# Patient Record
Sex: MALE | Race: WHITE | NOT HISPANIC OR LATINO | Employment: OTHER | URBAN - METROPOLITAN AREA
[De-identification: names, ages, dates, MRNs, and addresses within clinical notes are randomized per-mention and may not be internally consistent; named-entity substitution may affect disease eponyms.]

---

## 2017-01-03 ENCOUNTER — APPOINTMENT (OUTPATIENT)
Dept: PHYSICAL THERAPY | Facility: CLINIC | Age: 82
End: 2017-01-03
Payer: MEDICARE

## 2017-01-05 ENCOUNTER — APPOINTMENT (OUTPATIENT)
Dept: PHYSICAL THERAPY | Facility: CLINIC | Age: 82
End: 2017-01-05
Payer: MEDICARE

## 2017-01-10 ENCOUNTER — APPOINTMENT (OUTPATIENT)
Dept: PHYSICAL THERAPY | Facility: CLINIC | Age: 82
End: 2017-01-10
Payer: MEDICARE

## 2017-01-12 ENCOUNTER — APPOINTMENT (OUTPATIENT)
Dept: PHYSICAL THERAPY | Facility: CLINIC | Age: 82
End: 2017-01-12
Payer: MEDICARE

## 2017-01-16 ENCOUNTER — APPOINTMENT (INPATIENT)
Dept: RADIOLOGY | Facility: HOSPITAL | Age: 82
DRG: 378 | End: 2017-01-16
Payer: MEDICARE

## 2017-01-16 ENCOUNTER — HOSPITAL ENCOUNTER (INPATIENT)
Facility: HOSPITAL | Age: 82
LOS: 9 days | Discharge: NURSING, REHAB OR LONG-TERM CARE | DRG: 378 | End: 2017-01-25
Attending: EMERGENCY MEDICINE | Admitting: INTERNAL MEDICINE
Payer: MEDICARE

## 2017-01-16 ENCOUNTER — APPOINTMENT (EMERGENCY)
Dept: RADIOLOGY | Facility: HOSPITAL | Age: 82
DRG: 378 | End: 2017-01-16
Payer: MEDICARE

## 2017-01-16 DIAGNOSIS — K92.2 GI BLEED: Primary | ICD-10-CM

## 2017-01-16 DIAGNOSIS — D62 ACUTE BLOOD LOSS ANEMIA: ICD-10-CM

## 2017-01-16 DIAGNOSIS — K92.2 GI (GASTROINTESTINAL BLEED): ICD-10-CM

## 2017-01-16 DIAGNOSIS — K62.5 RECTAL BLEEDING: ICD-10-CM

## 2017-01-16 PROBLEM — I73.9 PAD (PERIPHERAL ARTERY DISEASE) (HCC): Status: ACTIVE | Noted: 2017-01-16

## 2017-01-16 PROBLEM — I10 HYPERTENSION: Status: ACTIVE | Noted: 2017-01-16

## 2017-01-16 PROBLEM — R53.1 WEAKNESS: Status: ACTIVE | Noted: 2017-01-16

## 2017-01-16 PROBLEM — R10.9 ABDOMINAL PAIN: Status: ACTIVE | Noted: 2017-01-16

## 2017-01-16 PROBLEM — M54.9 BACK PAIN: Status: ACTIVE | Noted: 2017-01-16

## 2017-01-16 PROBLEM — R11.0 NAUSEA: Status: ACTIVE | Noted: 2017-01-16

## 2017-01-16 PROBLEM — E78.5 HYPERLIPIDEMIA: Status: ACTIVE | Noted: 2017-01-16

## 2017-01-16 PROBLEM — K57.92 DIVERTICULITIS: Status: ACTIVE | Noted: 2017-01-16

## 2017-01-16 PROBLEM — I25.10 CAD (CORONARY ARTERY DISEASE): Status: ACTIVE | Noted: 2017-01-16

## 2017-01-16 LAB
ABO GROUP BLD: NORMAL
ALBUMIN SERPL BCP-MCNC: 2.8 G/DL (ref 3.5–5)
ALP SERPL-CCNC: 49 U/L (ref 46–116)
ALT SERPL W P-5'-P-CCNC: 17 U/L (ref 12–78)
ANION GAP SERPL CALCULATED.3IONS-SCNC: 12 MMOL/L (ref 4–13)
APTT PPP: 25 SECONDS (ref 24–33)
AST SERPL W P-5'-P-CCNC: 16 U/L (ref 5–45)
BASOPHILS # BLD AUTO: 0 THOUSANDS/ΜL (ref 0–0.1)
BASOPHILS NFR BLD AUTO: 0 % (ref 0–1)
BILIRUB DIRECT SERPL-MCNC: 0.1 MG/DL (ref 0–0.2)
BILIRUB SERPL-MCNC: 0.5 MG/DL (ref 0.2–1)
BILIRUB UR QL STRIP: NEGATIVE
BLD GP AB SCN SERPL QL: NEGATIVE
BUN SERPL-MCNC: 13 MG/DL (ref 5–25)
CALCIUM SERPL-MCNC: 8.7 MG/DL (ref 8.3–10.1)
CHLORIDE SERPL-SCNC: 107 MMOL/L (ref 100–108)
CK SERPL-CCNC: 35 U/L (ref 39–308)
CLARITY UR: CLEAR
CO2 SERPL-SCNC: 25 MMOL/L (ref 21–32)
COLOR UR: NORMAL
CREAT SERPL-MCNC: 1.17 MG/DL (ref 0.6–1.3)
EOSINOPHIL # BLD AUTO: 0.4 THOUSAND/ΜL (ref 0–0.61)
EOSINOPHIL NFR BLD AUTO: 3 % (ref 0–6)
ERYTHROCYTE [DISTWIDTH] IN BLOOD BY AUTOMATED COUNT: 14.8 % (ref 11.6–15.1)
GFR SERPL CREATININE-BSD FRML MDRD: 58.7 ML/MIN/1.73SQ M
GLUCOSE SERPL-MCNC: 117 MG/DL (ref 65–140)
GLUCOSE UR STRIP-MCNC: NEGATIVE MG/DL
HCT VFR BLD AUTO: 34.5 % (ref 42–52)
HGB BLD-MCNC: 11.4 G/DL (ref 14–18)
HGB UR QL STRIP.AUTO: NEGATIVE
INR PPP: 1.11 (ref 0.86–1.16)
KETONES UR STRIP-MCNC: NEGATIVE MG/DL
LEUKOCYTE ESTERASE UR QL STRIP: NEGATIVE
LIPASE SERPL-CCNC: 87 U/L (ref 73–393)
LYMPHOCYTES # BLD AUTO: 1.3 THOUSANDS/ΜL (ref 0.6–4.47)
LYMPHOCYTES NFR BLD AUTO: 10 % (ref 14–44)
MCH RBC QN AUTO: 28.8 PG (ref 27–31)
MCHC RBC AUTO-ENTMCNC: 32.9 G/DL (ref 31.4–37.4)
MCV RBC AUTO: 87 FL (ref 82–98)
MONOCYTES # BLD AUTO: 0.7 THOUSAND/ΜL (ref 0.17–1.22)
MONOCYTES NFR BLD AUTO: 6 % (ref 4–12)
NEUTROPHILS # BLD AUTO: 9.8 THOUSANDS/ΜL (ref 1.85–7.62)
NEUTS SEG NFR BLD AUTO: 80 % (ref 43–75)
NITRITE UR QL STRIP: NEGATIVE
NRBC BLD AUTO-RTO: 0 /100 WBCS
PH UR STRIP.AUTO: 5.5 [PH] (ref 5–9)
PLATELET # BLD AUTO: 519 THOUSANDS/UL (ref 130–400)
PMV BLD AUTO: 7.8 FL (ref 8.9–12.7)
POTASSIUM SERPL-SCNC: 3.9 MMOL/L (ref 3.5–5.3)
PROT SERPL-MCNC: 6.8 G/DL (ref 6.4–8.2)
PROT UR STRIP-MCNC: NEGATIVE MG/DL
PROTHROMBIN TIME: 11.7 SECONDS (ref 9.4–11.7)
RBC # BLD AUTO: 3.95 MILLION/UL (ref 4.7–6.1)
RH BLD: POSITIVE
SODIUM SERPL-SCNC: 144 MMOL/L (ref 136–145)
SP GR UR STRIP.AUTO: 1.01 (ref 1–1.03)
TROPONIN I SERPL-MCNC: 0.02 NG/ML
UROBILINOGEN UR QL STRIP.AUTO: 0.2 E.U./DL
WBC # BLD AUTO: 12.2 THOUSAND/UL (ref 4.8–10.8)

## 2017-01-16 PROCEDURE — 84484 ASSAY OF TROPONIN QUANT: CPT | Performed by: EMERGENCY MEDICINE

## 2017-01-16 PROCEDURE — C9113 INJ PANTOPRAZOLE SODIUM, VIA: HCPCS | Performed by: EMERGENCY MEDICINE

## 2017-01-16 PROCEDURE — 80076 HEPATIC FUNCTION PANEL: CPT | Performed by: EMERGENCY MEDICINE

## 2017-01-16 PROCEDURE — 86850 RBC ANTIBODY SCREEN: CPT | Performed by: EMERGENCY MEDICINE

## 2017-01-16 PROCEDURE — 83690 ASSAY OF LIPASE: CPT | Performed by: EMERGENCY MEDICINE

## 2017-01-16 PROCEDURE — 99285 EMERGENCY DEPT VISIT HI MDM: CPT

## 2017-01-16 PROCEDURE — 86901 BLOOD TYPING SEROLOGIC RH(D): CPT | Performed by: EMERGENCY MEDICINE

## 2017-01-16 PROCEDURE — 71010 HB CHEST X-RAY 1 VIEW FRONTAL (PORTABLE): CPT

## 2017-01-16 PROCEDURE — 85025 COMPLETE CBC W/AUTO DIFF WBC: CPT | Performed by: EMERGENCY MEDICINE

## 2017-01-16 PROCEDURE — 81003 URINALYSIS AUTO W/O SCOPE: CPT | Performed by: EMERGENCY MEDICINE

## 2017-01-16 PROCEDURE — 93005 ELECTROCARDIOGRAM TRACING: CPT | Performed by: EMERGENCY MEDICINE

## 2017-01-16 PROCEDURE — 86920 COMPATIBILITY TEST SPIN: CPT

## 2017-01-16 PROCEDURE — 74177 CT ABD & PELVIS W/CONTRAST: CPT

## 2017-01-16 PROCEDURE — 85610 PROTHROMBIN TIME: CPT | Performed by: PHYSICIAN ASSISTANT

## 2017-01-16 PROCEDURE — C9113 INJ PANTOPRAZOLE SODIUM, VIA: HCPCS | Performed by: PHYSICIAN ASSISTANT

## 2017-01-16 PROCEDURE — 87081 CULTURE SCREEN ONLY: CPT | Performed by: INTERNAL MEDICINE

## 2017-01-16 PROCEDURE — 80048 BASIC METABOLIC PNL TOTAL CA: CPT | Performed by: EMERGENCY MEDICINE

## 2017-01-16 PROCEDURE — 82550 ASSAY OF CK (CPK): CPT | Performed by: EMERGENCY MEDICINE

## 2017-01-16 PROCEDURE — 96376 TX/PRO/DX INJ SAME DRUG ADON: CPT

## 2017-01-16 PROCEDURE — 96365 THER/PROPH/DIAG IV INF INIT: CPT

## 2017-01-16 PROCEDURE — 36415 COLL VENOUS BLD VENIPUNCTURE: CPT | Performed by: EMERGENCY MEDICINE

## 2017-01-16 PROCEDURE — 85730 THROMBOPLASTIN TIME PARTIAL: CPT | Performed by: EMERGENCY MEDICINE

## 2017-01-16 PROCEDURE — 86900 BLOOD TYPING SEROLOGIC ABO: CPT | Performed by: EMERGENCY MEDICINE

## 2017-01-16 RX ORDER — PANTOPRAZOLE SODIUM 40 MG/1
40 INJECTION, POWDER, FOR SOLUTION INTRAVENOUS 2 TIMES DAILY
Status: DISCONTINUED | OUTPATIENT
Start: 2017-01-16 | End: 2017-01-25 | Stop reason: HOSPADM

## 2017-01-16 RX ORDER — PREDNISONE 10 MG/1
2.5 TABLET ORAL DAILY
COMMUNITY
End: 2017-01-25 | Stop reason: HOSPADM

## 2017-01-16 RX ORDER — LOSARTAN POTASSIUM 100 MG/1
50 TABLET ORAL DAILY
Status: ON HOLD | COMMUNITY
End: 2017-06-27

## 2017-01-16 RX ORDER — SIMVASTATIN 10 MG
10 TABLET ORAL
COMMUNITY
End: 2018-02-19

## 2017-01-16 RX ORDER — ASCORBIC ACID 500 MG
500 TABLET ORAL DAILY
COMMUNITY
End: 2018-02-19

## 2017-01-16 RX ORDER — SODIUM CHLORIDE 9 MG/ML
50 INJECTION, SOLUTION INTRAVENOUS CONTINUOUS
Status: DISCONTINUED | OUTPATIENT
Start: 2017-01-16 | End: 2017-01-16

## 2017-01-16 RX ORDER — LOSARTAN POTASSIUM 50 MG/1
100 TABLET ORAL DAILY
Status: DISCONTINUED | OUTPATIENT
Start: 2017-01-16 | End: 2017-01-20

## 2017-01-16 RX ORDER — MORPHINE SULFATE 2 MG/ML
2 INJECTION, SOLUTION INTRAMUSCULAR; INTRAVENOUS EVERY 4 HOURS PRN
Status: DISCONTINUED | OUTPATIENT
Start: 2017-01-16 | End: 2017-01-25 | Stop reason: HOSPADM

## 2017-01-16 RX ORDER — PRAVASTATIN SODIUM 20 MG
20 TABLET ORAL
Status: DISCONTINUED | OUTPATIENT
Start: 2017-01-16 | End: 2017-01-25 | Stop reason: HOSPADM

## 2017-01-16 RX ORDER — DONEPEZIL HYDROCHLORIDE 5 MG/1
10 TABLET, FILM COATED ORAL
Status: DISCONTINUED | OUTPATIENT
Start: 2017-01-16 | End: 2017-01-25 | Stop reason: HOSPADM

## 2017-01-16 RX ORDER — CILOSTAZOL 100 MG/1
100 TABLET ORAL 2 TIMES DAILY
COMMUNITY
End: 2018-02-19

## 2017-01-16 RX ORDER — DONEPEZIL HYDROCHLORIDE 10 MG/1
5 TABLET, FILM COATED ORAL
COMMUNITY
End: 2017-06-24

## 2017-01-16 RX ORDER — SODIUM CHLORIDE 9 MG/ML
75 INJECTION, SOLUTION INTRAVENOUS CONTINUOUS
Status: DISCONTINUED | OUTPATIENT
Start: 2017-01-16 | End: 2017-01-19

## 2017-01-16 RX ORDER — GABAPENTIN 300 MG/1
300 CAPSULE ORAL 2 TIMES DAILY
COMMUNITY
End: 2018-02-19

## 2017-01-16 RX ORDER — ACETAMINOPHEN 325 MG/1
650 TABLET ORAL EVERY 6 HOURS PRN
Status: DISCONTINUED | OUTPATIENT
Start: 2017-01-16 | End: 2017-01-25 | Stop reason: HOSPADM

## 2017-01-16 RX ORDER — CIPROFLOXACIN 2 MG/ML
400 INJECTION, SOLUTION INTRAVENOUS EVERY 12 HOURS
Status: DISCONTINUED | OUTPATIENT
Start: 2017-01-16 | End: 2017-01-25 | Stop reason: HOSPADM

## 2017-01-16 RX ORDER — METOPROLOL TARTRATE 50 MG/1
50 TABLET, FILM COATED ORAL 2 TIMES DAILY
COMMUNITY
End: 2018-02-19

## 2017-01-16 RX ORDER — FOLIC ACID/MULTIVIT,IRON,MINER .4-18-35
1 TABLET,CHEWABLE ORAL DAILY
COMMUNITY
End: 2018-02-19

## 2017-01-16 RX ORDER — TOLTERODINE TARTRATE 1 MG/1
2 TABLET, EXTENDED RELEASE ORAL DAILY
COMMUNITY
End: 2018-02-19

## 2017-01-16 RX ORDER — METOPROLOL TARTRATE 50 MG/1
50 TABLET, FILM COATED ORAL 2 TIMES DAILY
Status: DISCONTINUED | OUTPATIENT
Start: 2017-01-16 | End: 2017-01-20

## 2017-01-16 RX ORDER — ONDANSETRON 2 MG/ML
4 INJECTION INTRAMUSCULAR; INTRAVENOUS EVERY 6 HOURS PRN
Status: DISCONTINUED | OUTPATIENT
Start: 2017-01-16 | End: 2017-01-25 | Stop reason: HOSPADM

## 2017-01-16 RX ORDER — ASCORBIC ACID 500 MG
500 TABLET ORAL DAILY
Status: DISCONTINUED | OUTPATIENT
Start: 2017-01-17 | End: 2017-01-25 | Stop reason: HOSPADM

## 2017-01-16 RX ORDER — GABAPENTIN 300 MG/1
300 CAPSULE ORAL 2 TIMES DAILY
Status: DISCONTINUED | OUTPATIENT
Start: 2017-01-16 | End: 2017-01-25 | Stop reason: HOSPADM

## 2017-01-16 RX ORDER — PANTOPRAZOLE SODIUM 40 MG/1
40 INJECTION, POWDER, FOR SOLUTION INTRAVENOUS ONCE
Status: COMPLETED | OUTPATIENT
Start: 2017-01-16 | End: 2017-01-16

## 2017-01-16 RX ADMIN — METRONIDAZOLE 500 MG: 500 INJECTION, SOLUTION INTRAVENOUS at 17:08

## 2017-01-16 RX ADMIN — SODIUM CHLORIDE 500 ML: 0.9 INJECTION, SOLUTION INTRAVENOUS at 08:28

## 2017-01-16 RX ADMIN — CIPROFLOXACIN 400 MG: 2 INJECTION, SOLUTION INTRAVENOUS at 17:54

## 2017-01-16 RX ADMIN — METOPROLOL TARTRATE 50 MG: 50 TABLET ORAL at 17:53

## 2017-01-16 RX ADMIN — PANTOPRAZOLE SODIUM 40 MG: 40 INJECTION, POWDER, FOR SOLUTION INTRAVENOUS at 11:37

## 2017-01-16 RX ADMIN — SODIUM CHLORIDE 75 ML/HR: 0.9 INJECTION, SOLUTION INTRAVENOUS at 17:55

## 2017-01-16 RX ADMIN — DONEPEZIL HYDROCHLORIDE 10 MG: 5 TABLET, FILM COATED ORAL at 21:04

## 2017-01-16 RX ADMIN — PANTOPRAZOLE SODIUM 40 MG: 40 INJECTION, POWDER, FOR SOLUTION INTRAVENOUS at 21:04

## 2017-01-16 RX ADMIN — LOSARTAN POTASSIUM 100 MG: 50 TABLET, FILM COATED ORAL at 17:09

## 2017-01-16 RX ADMIN — PANTOPRAZOLE SODIUM 40 MG: 40 INJECTION, POWDER, FOR SOLUTION INTRAVENOUS at 08:38

## 2017-01-16 RX ADMIN — PRAVASTATIN SODIUM 20 MG: 20 TABLET ORAL at 17:09

## 2017-01-16 RX ADMIN — IOHEXOL 100 ML: 350 INJECTION, SOLUTION INTRAVENOUS at 14:13

## 2017-01-16 RX ADMIN — SODIUM CHLORIDE 50 ML/HR: 0.9 INJECTION, SOLUTION INTRAVENOUS at 09:51

## 2017-01-16 RX ADMIN — SODIUM CHLORIDE 8 MG/HR: 9 INJECTION, SOLUTION INTRAVENOUS at 08:56

## 2017-01-16 RX ADMIN — GABAPENTIN 300 MG: 300 CAPSULE ORAL at 17:53

## 2017-01-16 RX ADMIN — IOHEXOL 50 ML: 240 INJECTION, SOLUTION INTRATHECAL; INTRAVASCULAR; INTRAVENOUS; ORAL at 11:34

## 2017-01-17 ENCOUNTER — APPOINTMENT (OUTPATIENT)
Dept: PHYSICAL THERAPY | Facility: CLINIC | Age: 82
End: 2017-01-17
Payer: MEDICARE

## 2017-01-17 PROBLEM — D62 ACUTE BLOOD LOSS ANEMIA: Status: ACTIVE | Noted: 2017-01-17

## 2017-01-17 LAB
ANION GAP SERPL CALCULATED.3IONS-SCNC: 10 MMOL/L (ref 4–13)
BUN SERPL-MCNC: 14 MG/DL (ref 5–25)
CALCIUM SERPL-MCNC: 7.7 MG/DL (ref 8.3–10.1)
CHLORIDE SERPL-SCNC: 108 MMOL/L (ref 100–108)
CO2 SERPL-SCNC: 24 MMOL/L (ref 21–32)
CREAT SERPL-MCNC: 1.14 MG/DL (ref 0.6–1.3)
ERYTHROCYTE [DISTWIDTH] IN BLOOD BY AUTOMATED COUNT: 14 % (ref 11.6–15.1)
GFR SERPL CREATININE-BSD FRML MDRD: >60 ML/MIN/1.73SQ M
GLUCOSE SERPL-MCNC: 104 MG/DL (ref 65–140)
HCT VFR BLD AUTO: 26.7 % (ref 42–52)
HCT VFR BLD AUTO: 29.6 % (ref 42–52)
HGB BLD-MCNC: 8.8 G/DL (ref 14–18)
HGB BLD-MCNC: 9.5 G/DL (ref 14–18)
MAGNESIUM SERPL-MCNC: 1.7 MG/DL (ref 1.6–2.6)
MCH RBC QN AUTO: 28.8 PG (ref 27–31)
MCHC RBC AUTO-ENTMCNC: 32.8 G/DL (ref 31.4–37.4)
MCV RBC AUTO: 88 FL (ref 82–98)
PLATELET # BLD AUTO: 406 THOUSANDS/UL (ref 130–400)
PMV BLD AUTO: 8.5 FL (ref 8.9–12.7)
POTASSIUM SERPL-SCNC: 3.6 MMOL/L (ref 3.5–5.3)
RBC # BLD AUTO: 3.04 MILLION/UL (ref 4.7–6.1)
SODIUM SERPL-SCNC: 142 MMOL/L (ref 136–145)
WBC # BLD AUTO: 15.6 THOUSAND/UL (ref 4.8–10.8)

## 2017-01-17 PROCEDURE — G8988 SELF CARE GOAL STATUS: HCPCS

## 2017-01-17 PROCEDURE — 97162 PT EVAL MOD COMPLEX 30 MIN: CPT

## 2017-01-17 PROCEDURE — C9113 INJ PANTOPRAZOLE SODIUM, VIA: HCPCS | Performed by: PHYSICIAN ASSISTANT

## 2017-01-17 PROCEDURE — 85027 COMPLETE CBC AUTOMATED: CPT | Performed by: NURSE PRACTITIONER

## 2017-01-17 PROCEDURE — 80048 BASIC METABOLIC PNL TOTAL CA: CPT | Performed by: NURSE PRACTITIONER

## 2017-01-17 PROCEDURE — 83735 ASSAY OF MAGNESIUM: CPT | Performed by: NURSE PRACTITIONER

## 2017-01-17 PROCEDURE — 97166 OT EVAL MOD COMPLEX 45 MIN: CPT

## 2017-01-17 PROCEDURE — 85018 HEMOGLOBIN: CPT | Performed by: PHYSICIAN ASSISTANT

## 2017-01-17 PROCEDURE — G8987 SELF CARE CURRENT STATUS: HCPCS

## 2017-01-17 PROCEDURE — G8979 MOBILITY GOAL STATUS: HCPCS

## 2017-01-17 PROCEDURE — G8978 MOBILITY CURRENT STATUS: HCPCS

## 2017-01-17 PROCEDURE — 85014 HEMATOCRIT: CPT | Performed by: PHYSICIAN ASSISTANT

## 2017-01-17 RX ORDER — POLYETHYLENE GLYCOL 3350 17 G/17G
238 POWDER, FOR SOLUTION ORAL ONCE
Status: COMPLETED | OUTPATIENT
Start: 2017-01-17 | End: 2017-01-17

## 2017-01-17 RX ADMIN — METRONIDAZOLE 500 MG: 500 INJECTION, SOLUTION INTRAVENOUS at 08:12

## 2017-01-17 RX ADMIN — POLYETHYLENE GLYCOL 3350 238 G: 17 POWDER, FOR SOLUTION ORAL at 15:33

## 2017-01-17 RX ADMIN — BISACODYL 10 MG: 5 TABLET, COATED ORAL at 13:22

## 2017-01-17 RX ADMIN — PANTOPRAZOLE SODIUM 40 MG: 40 INJECTION, POWDER, FOR SOLUTION INTRAVENOUS at 08:14

## 2017-01-17 RX ADMIN — METOPROLOL TARTRATE 50 MG: 50 TABLET ORAL at 17:20

## 2017-01-17 RX ADMIN — METRONIDAZOLE 500 MG: 500 INJECTION, SOLUTION INTRAVENOUS at 23:45

## 2017-01-17 RX ADMIN — GABAPENTIN 300 MG: 300 CAPSULE ORAL at 08:14

## 2017-01-17 RX ADMIN — METOPROLOL TARTRATE 50 MG: 50 TABLET ORAL at 08:14

## 2017-01-17 RX ADMIN — CIPROFLOXACIN 400 MG: 2 INJECTION, SOLUTION INTRAVENOUS at 03:36

## 2017-01-17 RX ADMIN — OXYCODONE HYDROCHLORIDE AND ACETAMINOPHEN 500 MG: 500 TABLET ORAL at 08:21

## 2017-01-17 RX ADMIN — PRAVASTATIN SODIUM 20 MG: 20 TABLET ORAL at 16:58

## 2017-01-17 RX ADMIN — METRONIDAZOLE 500 MG: 500 INJECTION, SOLUTION INTRAVENOUS at 16:57

## 2017-01-17 RX ADMIN — METRONIDAZOLE 500 MG: 500 INJECTION, SOLUTION INTRAVENOUS at 00:31

## 2017-01-17 RX ADMIN — DONEPEZIL HYDROCHLORIDE 10 MG: 5 TABLET, FILM COATED ORAL at 21:58

## 2017-01-17 RX ADMIN — GABAPENTIN 300 MG: 300 CAPSULE ORAL at 17:20

## 2017-01-17 RX ADMIN — LOSARTAN POTASSIUM 100 MG: 50 TABLET, FILM COATED ORAL at 08:21

## 2017-01-17 RX ADMIN — PANTOPRAZOLE SODIUM 40 MG: 40 INJECTION, POWDER, FOR SOLUTION INTRAVENOUS at 21:58

## 2017-01-17 RX ADMIN — Medication 1 TABLET: at 08:21

## 2017-01-17 RX ADMIN — CIPROFLOXACIN 400 MG: 2 INJECTION, SOLUTION INTRAVENOUS at 17:22

## 2017-01-18 ENCOUNTER — ANESTHESIA EVENT (INPATIENT)
Dept: GASTROENTEROLOGY | Facility: AMBULARY SURGERY CENTER | Age: 82
DRG: 378 | End: 2017-01-18
Payer: MEDICARE

## 2017-01-18 ENCOUNTER — GENERIC CONVERSION - ENCOUNTER (OUTPATIENT)
Dept: OTHER | Facility: OTHER | Age: 82
End: 2017-01-18

## 2017-01-18 LAB
ANION GAP SERPL CALCULATED.3IONS-SCNC: 9 MMOL/L (ref 4–13)
BUN SERPL-MCNC: 9 MG/DL (ref 5–25)
CALCIUM SERPL-MCNC: 8 MG/DL (ref 8.3–10.1)
CHLORIDE SERPL-SCNC: 112 MMOL/L (ref 100–108)
CO2 SERPL-SCNC: 24 MMOL/L (ref 21–32)
CREAT SERPL-MCNC: 1.04 MG/DL (ref 0.6–1.3)
ERYTHROCYTE [DISTWIDTH] IN BLOOD BY AUTOMATED COUNT: 15.4 % (ref 11.6–15.1)
GFR SERPL CREATININE-BSD FRML MDRD: >60 ML/MIN/1.73SQ M
GLUCOSE SERPL-MCNC: 88 MG/DL (ref 65–140)
HCT VFR BLD AUTO: 24.9 % (ref 42–52)
HCT VFR BLD AUTO: 27.1 % (ref 42–52)
HGB BLD-MCNC: 8.1 G/DL (ref 14–18)
HGB BLD-MCNC: 8.8 G/DL (ref 14–18)
MAGNESIUM SERPL-MCNC: 1.7 MG/DL (ref 1.6–2.6)
MCH RBC QN AUTO: 28.4 PG (ref 27–31)
MCHC RBC AUTO-ENTMCNC: 32.5 G/DL (ref 31.4–37.4)
MCV RBC AUTO: 87 FL (ref 82–98)
MRSA NOSE QL CULT: NORMAL
PLATELET # BLD AUTO: 369 THOUSANDS/UL (ref 130–400)
PMV BLD AUTO: 7.8 FL (ref 8.9–12.7)
POTASSIUM SERPL-SCNC: 3.3 MMOL/L (ref 3.5–5.3)
RBC # BLD AUTO: 3.09 MILLION/UL (ref 4.7–6.1)
SODIUM SERPL-SCNC: 145 MMOL/L (ref 136–145)
WBC # BLD AUTO: 8.4 THOUSAND/UL (ref 4.8–10.8)

## 2017-01-18 PROCEDURE — G0008 ADMIN INFLUENZA VIRUS VAC: HCPCS | Performed by: INTERNAL MEDICINE

## 2017-01-18 PROCEDURE — 85014 HEMATOCRIT: CPT | Performed by: INTERNAL MEDICINE

## 2017-01-18 PROCEDURE — 90732 PPSV23 VACC 2 YRS+ SUBQ/IM: CPT | Performed by: INTERNAL MEDICINE

## 2017-01-18 PROCEDURE — 90686 IIV4 VACC NO PRSV 0.5 ML IM: CPT | Performed by: INTERNAL MEDICINE

## 2017-01-18 PROCEDURE — 85027 COMPLETE CBC AUTOMATED: CPT | Performed by: NURSE PRACTITIONER

## 2017-01-18 PROCEDURE — 3E1H88Z IRRIGATION OF LOWER GI USING IRRIGATING SUBSTANCE, VIA NATURAL OR ARTIFICIAL OPENING ENDOSCOPIC: ICD-10-PCS | Performed by: INTERNAL MEDICINE

## 2017-01-18 PROCEDURE — 83735 ASSAY OF MAGNESIUM: CPT | Performed by: NURSE PRACTITIONER

## 2017-01-18 PROCEDURE — 85018 HEMOGLOBIN: CPT | Performed by: INTERNAL MEDICINE

## 2017-01-18 PROCEDURE — C9113 INJ PANTOPRAZOLE SODIUM, VIA: HCPCS | Performed by: PHYSICIAN ASSISTANT

## 2017-01-18 PROCEDURE — 80048 BASIC METABOLIC PNL TOTAL CA: CPT | Performed by: NURSE PRACTITIONER

## 2017-01-18 PROCEDURE — G0009 ADMIN PNEUMOCOCCAL VACCINE: HCPCS | Performed by: INTERNAL MEDICINE

## 2017-01-18 RX ORDER — PROPOFOL 10 MG/ML
INJECTION, EMULSION INTRAVENOUS AS NEEDED
Status: DISCONTINUED | OUTPATIENT
Start: 2017-01-18 | End: 2017-01-18 | Stop reason: SURG

## 2017-01-18 RX ORDER — SODIUM CHLORIDE, SODIUM LACTATE, POTASSIUM CHLORIDE, CALCIUM CHLORIDE 600; 310; 30; 20 MG/100ML; MG/100ML; MG/100ML; MG/100ML
125 INJECTION, SOLUTION INTRAVENOUS CONTINUOUS
Status: DISCONTINUED | OUTPATIENT
Start: 2017-01-18 | End: 2017-01-18

## 2017-01-18 RX ORDER — POTASSIUM CHLORIDE 14.9 MG/ML
20 INJECTION INTRAVENOUS ONCE
Status: COMPLETED | OUTPATIENT
Start: 2017-01-18 | End: 2017-01-18

## 2017-01-18 RX ORDER — AMLODIPINE BESYLATE 2.5 MG/1
2.5 TABLET ORAL DAILY
Status: DISCONTINUED | OUTPATIENT
Start: 2017-01-18 | End: 2017-01-20

## 2017-01-18 RX ADMIN — PNEUMOCOCCAL VACCINE POLYVALENT 0.5 ML
25; 25; 25; 25; 25; 25; 25; 25; 25; 25; 25; 25; 25; 25; 25; 25; 25; 25; 25; 25; 25; 25; 25 INJECTION, SOLUTION INTRAMUSCULAR; SUBCUTANEOUS at 10:48

## 2017-01-18 RX ADMIN — SODIUM CHLORIDE, SODIUM LACTATE, POTASSIUM CHLORIDE, AND CALCIUM CHLORIDE 125 ML/HR: .6; .31; .03; .02 INJECTION, SOLUTION INTRAVENOUS at 16:23

## 2017-01-18 RX ADMIN — METOPROLOL TARTRATE 50 MG: 50 TABLET ORAL at 08:16

## 2017-01-18 RX ADMIN — GABAPENTIN 300 MG: 300 CAPSULE ORAL at 17:48

## 2017-01-18 RX ADMIN — PANTOPRAZOLE SODIUM 40 MG: 40 INJECTION, POWDER, FOR SOLUTION INTRAVENOUS at 08:16

## 2017-01-18 RX ADMIN — LOSARTAN POTASSIUM 100 MG: 50 TABLET, FILM COATED ORAL at 08:16

## 2017-01-18 RX ADMIN — SODIUM CHLORIDE 75 ML/HR: 0.9 INJECTION, SOLUTION INTRAVENOUS at 22:15

## 2017-01-18 RX ADMIN — METRONIDAZOLE 500 MG: 500 INJECTION, SOLUTION INTRAVENOUS at 23:47

## 2017-01-18 RX ADMIN — INFLUENZA VIRUS VACCINE 0.5 ML: 15; 15; 15; 15 SUSPENSION INTRAMUSCULAR at 10:52

## 2017-01-18 RX ADMIN — DONEPEZIL HYDROCHLORIDE 10 MG: 5 TABLET, FILM COATED ORAL at 22:32

## 2017-01-18 RX ADMIN — CIPROFLOXACIN 400 MG: 2 INJECTION, SOLUTION INTRAVENOUS at 17:02

## 2017-01-18 RX ADMIN — GABAPENTIN 300 MG: 300 CAPSULE ORAL at 08:16

## 2017-01-18 RX ADMIN — OXYCODONE HYDROCHLORIDE AND ACETAMINOPHEN 500 MG: 500 TABLET ORAL at 08:16

## 2017-01-18 RX ADMIN — CIPROFLOXACIN 400 MG: 2 INJECTION, SOLUTION INTRAVENOUS at 04:35

## 2017-01-18 RX ADMIN — PROPOFOL 50 MG: 10 INJECTION, EMULSION INTRAVENOUS at 12:59

## 2017-01-18 RX ADMIN — PANTOPRAZOLE SODIUM 40 MG: 40 INJECTION, POWDER, FOR SOLUTION INTRAVENOUS at 22:16

## 2017-01-18 RX ADMIN — AMLODIPINE BESYLATE 2.5 MG: 2.5 TABLET ORAL at 07:43

## 2017-01-18 RX ADMIN — PROPOFOL 30 MG: 10 INJECTION, EMULSION INTRAVENOUS at 13:10

## 2017-01-18 RX ADMIN — Medication 1 TABLET: at 08:16

## 2017-01-18 RX ADMIN — SODIUM CHLORIDE 75 ML/HR: 0.9 INJECTION, SOLUTION INTRAVENOUS at 03:12

## 2017-01-18 RX ADMIN — POTASSIUM CHLORIDE 20 MEQ: 200 INJECTION, SOLUTION INTRAVENOUS at 10:47

## 2017-01-18 RX ADMIN — PRAVASTATIN SODIUM 20 MG: 20 TABLET ORAL at 16:21

## 2017-01-18 RX ADMIN — METOPROLOL TARTRATE 50 MG: 50 TABLET ORAL at 17:48

## 2017-01-18 RX ADMIN — METRONIDAZOLE 500 MG: 500 INJECTION, SOLUTION INTRAVENOUS at 07:43

## 2017-01-18 RX ADMIN — METRONIDAZOLE 500 MG: 500 INJECTION, SOLUTION INTRAVENOUS at 16:04

## 2017-01-18 RX ADMIN — PROPOFOL 30 MG: 10 INJECTION, EMULSION INTRAVENOUS at 13:03

## 2017-01-19 ENCOUNTER — ANESTHESIA EVENT (INPATIENT)
Dept: GASTROENTEROLOGY | Facility: AMBULARY SURGERY CENTER | Age: 82
DRG: 378 | End: 2017-01-19
Payer: MEDICARE

## 2017-01-19 ENCOUNTER — OFFICE VISIT (OUTPATIENT)
Dept: PHYSICAL THERAPY | Facility: CLINIC | Age: 82
End: 2017-01-19
Payer: MEDICARE

## 2017-01-19 ENCOUNTER — APPOINTMENT (INPATIENT)
Dept: RADIOLOGY | Facility: HOSPITAL | Age: 82
DRG: 378 | End: 2017-01-19
Payer: MEDICARE

## 2017-01-19 PROBLEM — F03.90 DEMENTIA (HCC): Chronic | Status: ACTIVE | Noted: 2017-01-19

## 2017-01-19 LAB
ABO GROUP BLD: NORMAL
ANION GAP SERPL CALCULATED.3IONS-SCNC: 8 MMOL/L (ref 4–13)
ANION GAP SERPL CALCULATED.3IONS-SCNC: 9 MMOL/L (ref 4–13)
BASOPHILS # BLD AUTO: 0.2 THOUSANDS/ΜL (ref 0–0.1)
BASOPHILS # BLD AUTO: 0.3 THOUSANDS/ΜL (ref 0–0.1)
BASOPHILS NFR BLD AUTO: 2 % (ref 0–1)
BASOPHILS NFR BLD AUTO: 3 % (ref 0–1)
BUN SERPL-MCNC: 8 MG/DL (ref 5–25)
BUN SERPL-MCNC: 8 MG/DL (ref 5–25)
CALCIUM SERPL-MCNC: 7.5 MG/DL (ref 8.3–10.1)
CALCIUM SERPL-MCNC: 7.5 MG/DL (ref 8.3–10.1)
CHLORIDE SERPL-SCNC: 110 MMOL/L (ref 100–108)
CHLORIDE SERPL-SCNC: 111 MMOL/L (ref 100–108)
CO2 SERPL-SCNC: 23 MMOL/L (ref 21–32)
CO2 SERPL-SCNC: 23 MMOL/L (ref 21–32)
CREAT SERPL-MCNC: 1.09 MG/DL (ref 0.6–1.3)
CREAT SERPL-MCNC: 1.09 MG/DL (ref 0.6–1.3)
EOSINOPHIL # BLD AUTO: 0.4 THOUSAND/ΜL (ref 0–0.61)
EOSINOPHIL # BLD AUTO: 0.7 THOUSAND/ΜL (ref 0–0.61)
EOSINOPHIL NFR BLD AUTO: 5 % (ref 0–6)
EOSINOPHIL NFR BLD AUTO: 7 % (ref 0–6)
ERYTHROCYTE [DISTWIDTH] IN BLOOD BY AUTOMATED COUNT: 14.8 % (ref 11.6–15.1)
ERYTHROCYTE [DISTWIDTH] IN BLOOD BY AUTOMATED COUNT: 15 % (ref 11.6–15.1)
ERYTHROCYTE [DISTWIDTH] IN BLOOD BY AUTOMATED COUNT: 15 % (ref 11.6–15.1)
GFR SERPL CREATININE-BSD FRML MDRD: >60 ML/MIN/1.73SQ M
GFR SERPL CREATININE-BSD FRML MDRD: >60 ML/MIN/1.73SQ M
GLUCOSE SERPL-MCNC: 107 MG/DL (ref 65–140)
GLUCOSE SERPL-MCNC: 116 MG/DL (ref 65–140)
GLUCOSE SERPL-MCNC: 147 MG/DL (ref 65–140)
HCT VFR BLD AUTO: 18.7 % (ref 42–52)
HCT VFR BLD AUTO: 21.6 % (ref 42–52)
HCT VFR BLD AUTO: 25.8 % (ref 42–52)
HCT VFR BLD AUTO: 25.9 % (ref 42–52)
HGB BLD-MCNC: 6.1 G/DL (ref 14–18)
HGB BLD-MCNC: 6.6 G/DL (ref 14–18)
HGB BLD-MCNC: 8.4 G/DL (ref 14–18)
HGB BLD-MCNC: 8.5 G/DL (ref 14–18)
LYMPHOCYTES # BLD AUTO: 1.2 THOUSANDS/ΜL (ref 0.6–4.47)
LYMPHOCYTES # BLD AUTO: 1.3 THOUSANDS/ΜL (ref 0.6–4.47)
LYMPHOCYTES NFR BLD AUTO: 14 % (ref 14–44)
LYMPHOCYTES NFR BLD AUTO: 15 % (ref 14–44)
MCH RBC QN AUTO: 28.6 PG (ref 27–31)
MCH RBC QN AUTO: 28.8 PG (ref 27–31)
MCH RBC QN AUTO: 29 PG (ref 27–31)
MCHC RBC AUTO-ENTMCNC: 32.5 G/DL (ref 31.4–37.4)
MCHC RBC AUTO-ENTMCNC: 32.5 G/DL (ref 31.4–37.4)
MCHC RBC AUTO-ENTMCNC: 32.7 G/DL (ref 31.4–37.4)
MCV RBC AUTO: 88 FL (ref 82–98)
MCV RBC AUTO: 89 FL (ref 82–98)
MCV RBC AUTO: 89 FL (ref 82–98)
MONOCYTES # BLD AUTO: 0.6 THOUSAND/ΜL (ref 0.17–1.22)
MONOCYTES # BLD AUTO: 0.6 THOUSAND/ΜL (ref 0.17–1.22)
MONOCYTES NFR BLD AUTO: 7 % (ref 4–12)
MONOCYTES NFR BLD AUTO: 8 % (ref 4–12)
NEUTROPHILS # BLD AUTO: 5.7 THOUSANDS/ΜL (ref 1.85–7.62)
NEUTROPHILS # BLD AUTO: 6.1 THOUSANDS/ΜL (ref 1.85–7.62)
NEUTS SEG NFR BLD AUTO: 69 % (ref 43–75)
NEUTS SEG NFR BLD AUTO: 70 % (ref 43–75)
NRBC BLD AUTO-RTO: 0 /100 WBCS
NRBC BLD AUTO-RTO: 0 /100 WBCS
PLATELET # BLD AUTO: 289 THOUSANDS/UL (ref 130–400)
PLATELET # BLD AUTO: 301 THOUSANDS/UL (ref 130–400)
PLATELET # BLD AUTO: 332 THOUSANDS/UL (ref 130–400)
PMV BLD AUTO: 7.2 FL (ref 8.9–12.7)
PMV BLD AUTO: 7.2 FL (ref 8.9–12.7)
PMV BLD AUTO: 7.8 FL (ref 8.9–12.7)
POTASSIUM SERPL-SCNC: 3.2 MMOL/L (ref 3.5–5.3)
POTASSIUM SERPL-SCNC: 3.8 MMOL/L (ref 3.5–5.3)
RBC # BLD AUTO: 2.13 MILLION/UL (ref 4.7–6.1)
RBC # BLD AUTO: 2.9 MILLION/UL (ref 4.7–6.1)
RBC # BLD AUTO: 2.92 MILLION/UL (ref 4.7–6.1)
RH BLD: POSITIVE
SODIUM SERPL-SCNC: 141 MMOL/L (ref 136–145)
SODIUM SERPL-SCNC: 143 MMOL/L (ref 136–145)
WBC # BLD AUTO: 8.1 THOUSAND/UL (ref 4.8–10.8)
WBC # BLD AUTO: 8.4 THOUSAND/UL (ref 4.8–10.8)
WBC # BLD AUTO: 9 THOUSAND/UL (ref 4.8–10.8)

## 2017-01-19 PROCEDURE — 85027 COMPLETE CBC AUTOMATED: CPT | Performed by: NURSE PRACTITIONER

## 2017-01-19 PROCEDURE — 93005 ELECTROCARDIOGRAM TRACING: CPT

## 2017-01-19 PROCEDURE — 85025 COMPLETE CBC W/AUTO DIFF WBC: CPT | Performed by: FAMILY MEDICINE

## 2017-01-19 PROCEDURE — 85018 HEMOGLOBIN: CPT | Performed by: NURSE PRACTITIONER

## 2017-01-19 PROCEDURE — 0W3P8ZZ CONTROL BLEEDING IN GASTROINTESTINAL TRACT, VIA NATURAL OR ARTIFICIAL OPENING ENDOSCOPIC: ICD-10-PCS | Performed by: INTERNAL MEDICINE

## 2017-01-19 PROCEDURE — 87081 CULTURE SCREEN ONLY: CPT | Performed by: ANESTHESIOLOGY

## 2017-01-19 PROCEDURE — 30233N1 TRANSFUSION OF NONAUTOLOGOUS RED BLOOD CELLS INTO PERIPHERAL VEIN, PERCUTANEOUS APPROACH: ICD-10-PCS | Performed by: INTERNAL MEDICINE

## 2017-01-19 PROCEDURE — 86901 BLOOD TYPING SEROLOGIC RH(D): CPT | Performed by: INTERNAL MEDICINE

## 2017-01-19 PROCEDURE — 80048 BASIC METABOLIC PNL TOTAL CA: CPT | Performed by: FAMILY MEDICINE

## 2017-01-19 PROCEDURE — 80048 BASIC METABOLIC PNL TOTAL CA: CPT | Performed by: NURSE PRACTITIONER

## 2017-01-19 PROCEDURE — 86900 BLOOD TYPING SEROLOGIC ABO: CPT | Performed by: INTERNAL MEDICINE

## 2017-01-19 PROCEDURE — 85025 COMPLETE CBC W/AUTO DIFF WBC: CPT | Performed by: STUDENT IN AN ORGANIZED HEALTH CARE EDUCATION/TRAINING PROGRAM

## 2017-01-19 PROCEDURE — C9113 INJ PANTOPRAZOLE SODIUM, VIA: HCPCS | Performed by: PHYSICIAN ASSISTANT

## 2017-01-19 PROCEDURE — 82948 REAGENT STRIP/BLOOD GLUCOSE: CPT

## 2017-01-19 PROCEDURE — 0DJ08ZZ INSPECTION OF UPPER INTESTINAL TRACT, VIA NATURAL OR ARTIFICIAL OPENING ENDOSCOPIC: ICD-10-PCS | Performed by: INTERNAL MEDICINE

## 2017-01-19 PROCEDURE — P9016 RBC LEUKOCYTES REDUCED: HCPCS

## 2017-01-19 PROCEDURE — 74178 CT ABD&PLV WO CNTR FLWD CNTR: CPT

## 2017-01-19 PROCEDURE — 85014 HEMATOCRIT: CPT | Performed by: NURSE PRACTITIONER

## 2017-01-19 RX ORDER — ACETAMINOPHEN 325 MG/1
650 TABLET ORAL ONCE
Status: COMPLETED | OUTPATIENT
Start: 2017-01-19 | End: 2017-01-19

## 2017-01-19 RX ORDER — POTASSIUM CHLORIDE 14.9 MG/ML
20 INJECTION INTRAVENOUS ONCE
Status: COMPLETED | OUTPATIENT
Start: 2017-01-19 | End: 2017-01-19

## 2017-01-19 RX ORDER — SODIUM CHLORIDE 9 MG/ML
50 INJECTION, SOLUTION INTRAVENOUS EVERY 24 HOURS
Status: DISCONTINUED | OUTPATIENT
Start: 2017-01-19 | End: 2017-01-21

## 2017-01-19 RX ORDER — DIPHENHYDRAMINE HCL 25 MG
25 TABLET ORAL ONCE
Status: COMPLETED | OUTPATIENT
Start: 2017-01-19 | End: 2017-01-19

## 2017-01-19 RX ORDER — SODIUM CHLORIDE 9 MG/ML
75 INJECTION, SOLUTION INTRAVENOUS EVERY 24 HOURS
Status: DISCONTINUED | OUTPATIENT
Start: 2017-01-19 | End: 2017-01-19

## 2017-01-19 RX ORDER — PROPOFOL 10 MG/ML
INJECTION, EMULSION INTRAVENOUS AS NEEDED
Status: DISCONTINUED | OUTPATIENT
Start: 2017-01-19 | End: 2017-01-19 | Stop reason: SURG

## 2017-01-19 RX ORDER — POTASSIUM CHLORIDE 29.8 MG/ML
40 INJECTION INTRAVENOUS ONCE
Status: DISCONTINUED | OUTPATIENT
Start: 2017-01-19 | End: 2017-01-19 | Stop reason: CLARIF

## 2017-01-19 RX ORDER — FUROSEMIDE 10 MG/ML
20 INJECTION INTRAMUSCULAR; INTRAVENOUS ONCE
Status: DISCONTINUED | OUTPATIENT
Start: 2017-01-19 | End: 2017-01-19

## 2017-01-19 RX ADMIN — GABAPENTIN 300 MG: 300 CAPSULE ORAL at 17:01

## 2017-01-19 RX ADMIN — OXYCODONE HYDROCHLORIDE AND ACETAMINOPHEN 500 MG: 500 TABLET ORAL at 09:21

## 2017-01-19 RX ADMIN — PANTOPRAZOLE SODIUM 40 MG: 40 INJECTION, POWDER, FOR SOLUTION INTRAVENOUS at 09:39

## 2017-01-19 RX ADMIN — POTASSIUM CHLORIDE 20 MEQ: 200 INJECTION, SOLUTION INTRAVENOUS at 11:13

## 2017-01-19 RX ADMIN — DIPHENHYDRAMINE HCL 25 MG: 25 TABLET ORAL at 07:14

## 2017-01-19 RX ADMIN — PROPOFOL 10 MG: 10 INJECTION, EMULSION INTRAVENOUS at 16:12

## 2017-01-19 RX ADMIN — PROPOFOL 10 MG: 10 INJECTION, EMULSION INTRAVENOUS at 16:17

## 2017-01-19 RX ADMIN — DONEPEZIL HYDROCHLORIDE 10 MG: 5 TABLET, FILM COATED ORAL at 22:04

## 2017-01-19 RX ADMIN — METRONIDAZOLE 500 MG: 500 INJECTION, SOLUTION INTRAVENOUS at 17:57

## 2017-01-19 RX ADMIN — GABAPENTIN 300 MG: 300 CAPSULE ORAL at 09:21

## 2017-01-19 RX ADMIN — AMLODIPINE BESYLATE 2.5 MG: 2.5 TABLET ORAL at 09:21

## 2017-01-19 RX ADMIN — METOPROLOL TARTRATE 50 MG: 50 TABLET ORAL at 09:21

## 2017-01-19 RX ADMIN — PROPOFOL 50 MG: 10 INJECTION, EMULSION INTRAVENOUS at 15:53

## 2017-01-19 RX ADMIN — CIPROFLOXACIN 400 MG: 2 INJECTION, SOLUTION INTRAVENOUS at 16:56

## 2017-01-19 RX ADMIN — POTASSIUM CHLORIDE 20 MEQ: 200 INJECTION, SOLUTION INTRAVENOUS at 14:20

## 2017-01-19 RX ADMIN — PROPOFOL 20 MG: 10 INJECTION, EMULSION INTRAVENOUS at 15:59

## 2017-01-19 RX ADMIN — LOSARTAN POTASSIUM 100 MG: 50 TABLET, FILM COATED ORAL at 09:21

## 2017-01-19 RX ADMIN — METRONIDAZOLE 500 MG: 500 INJECTION, SOLUTION INTRAVENOUS at 11:13

## 2017-01-19 RX ADMIN — SODIUM CHLORIDE 50 ML/HR: 0.9 INJECTION, SOLUTION INTRAVENOUS at 22:00

## 2017-01-19 RX ADMIN — IOHEXOL 100 ML: 350 INJECTION, SOLUTION INTRAVENOUS at 10:17

## 2017-01-19 RX ADMIN — PRAVASTATIN SODIUM 20 MG: 20 TABLET ORAL at 17:01

## 2017-01-19 RX ADMIN — ACETAMINOPHEN 650 MG: 325 TABLET, FILM COATED ORAL at 07:14

## 2017-01-19 RX ADMIN — PROPOFOL 10 MG: 10 INJECTION, EMULSION INTRAVENOUS at 16:07

## 2017-01-19 RX ADMIN — Medication 1 TABLET: at 09:21

## 2017-01-19 RX ADMIN — CIPROFLOXACIN 400 MG: 2 INJECTION, SOLUTION INTRAVENOUS at 03:43

## 2017-01-19 RX ADMIN — PANTOPRAZOLE SODIUM 40 MG: 40 INJECTION, POWDER, FOR SOLUTION INTRAVENOUS at 21:04

## 2017-01-19 RX ADMIN — PROPOFOL 10 MG: 10 INJECTION, EMULSION INTRAVENOUS at 16:22

## 2017-01-19 RX ADMIN — METRONIDAZOLE 500 MG: 500 INJECTION, SOLUTION INTRAVENOUS at 23:06

## 2017-01-20 ENCOUNTER — APPOINTMENT (INPATIENT)
Dept: NON INVASIVE DIAGNOSTICS | Facility: HOSPITAL | Age: 82
DRG: 378 | End: 2017-01-20
Attending: INTERNAL MEDICINE
Payer: MEDICARE

## 2017-01-20 LAB
ABO GROUP BLD BPU: NORMAL
ABO GROUP BLD BPU: NORMAL
ABO GROUP BLD: NORMAL
ALBUMIN SERPL BCP-MCNC: 2.1 G/DL (ref 3.5–5)
ALP SERPL-CCNC: 33 U/L (ref 46–116)
ALT SERPL W P-5'-P-CCNC: 16 U/L (ref 12–78)
ANION GAP SERPL CALCULATED.3IONS-SCNC: 9 MMOL/L (ref 4–13)
AST SERPL W P-5'-P-CCNC: 26 U/L (ref 5–45)
BASOPHILS # BLD AUTO: 0.1 THOUSANDS/ΜL (ref 0–0.1)
BASOPHILS NFR BLD AUTO: 1 % (ref 0–1)
BILIRUB SERPL-MCNC: 0.4 MG/DL (ref 0.2–1)
BLD GP AB SCN SERPL QL: NEGATIVE
BPU ID: NORMAL
BPU ID: NORMAL
BUN SERPL-MCNC: 7 MG/DL (ref 5–25)
CALCIUM SERPL-MCNC: 7.6 MG/DL (ref 8.3–10.1)
CHLORIDE SERPL-SCNC: 110 MMOL/L (ref 100–108)
CO2 SERPL-SCNC: 23 MMOL/L (ref 21–32)
CREAT SERPL-MCNC: 1.11 MG/DL (ref 0.6–1.3)
CROSSMATCH: NORMAL
CROSSMATCH: NORMAL
EOSINOPHIL # BLD AUTO: 0.8 THOUSAND/ΜL (ref 0–0.61)
EOSINOPHIL NFR BLD AUTO: 9 % (ref 0–6)
ERYTHROCYTE [DISTWIDTH] IN BLOOD BY AUTOMATED COUNT: 15.1 % (ref 11.6–15.1)
GFR SERPL CREATININE-BSD FRML MDRD: >60 ML/MIN/1.73SQ M
GLUCOSE SERPL-MCNC: 95 MG/DL (ref 65–140)
HCT VFR BLD AUTO: 22.6 % (ref 42–52)
HCT VFR BLD AUTO: 24.2 % (ref 42–52)
HCT VFR BLD AUTO: 24.8 % (ref 42–52)
HGB BLD-MCNC: 7.4 G/DL (ref 14–18)
HGB BLD-MCNC: 7.9 G/DL (ref 14–18)
HGB BLD-MCNC: 8.1 G/DL (ref 14–18)
INR PPP: 1.29 (ref 0.86–1.16)
LYMPHOCYTES # BLD AUTO: 1.3 THOUSANDS/ΜL (ref 0.6–4.47)
LYMPHOCYTES NFR BLD AUTO: 16 % (ref 14–44)
MCH RBC QN AUTO: 29.1 PG (ref 27–31)
MCHC RBC AUTO-ENTMCNC: 32.8 G/DL (ref 31.4–37.4)
MCV RBC AUTO: 89 FL (ref 82–98)
MONOCYTES # BLD AUTO: 0.6 THOUSAND/ΜL (ref 0.17–1.22)
MONOCYTES NFR BLD AUTO: 7 % (ref 4–12)
MRSA NOSE QL CULT: NORMAL
NEUTROPHILS # BLD AUTO: 5.4 THOUSANDS/ΜL (ref 1.85–7.62)
NEUTS SEG NFR BLD AUTO: 67 % (ref 43–75)
NRBC BLD AUTO-RTO: 0 /100 WBCS
PLATELET # BLD AUTO: 273 THOUSANDS/UL (ref 130–400)
PMV BLD AUTO: 7.2 FL (ref 8.9–12.7)
POTASSIUM SERPL-SCNC: 3.4 MMOL/L (ref 3.5–5.3)
PROT SERPL-MCNC: 4.6 G/DL (ref 6.4–8.2)
PROTHROMBIN TIME: 13.6 SECONDS (ref 9.4–11.7)
RBC # BLD AUTO: 2.73 MILLION/UL (ref 4.7–6.1)
RH BLD: POSITIVE
SODIUM SERPL-SCNC: 142 MMOL/L (ref 136–145)
UNIT DISPENSE STATUS: NORMAL
UNIT DISPENSE STATUS: NORMAL
UNIT PRODUCT CODE: NORMAL
UNIT PRODUCT CODE: NORMAL
UNIT RH: NORMAL
UNIT RH: NORMAL
WBC # BLD AUTO: 8 THOUSAND/UL (ref 4.8–10.8)

## 2017-01-20 PROCEDURE — P9021 RED BLOOD CELLS UNIT: HCPCS

## 2017-01-20 PROCEDURE — 36246 INS CATH ABD/L-EXT ART 2ND: CPT

## 2017-01-20 PROCEDURE — 86850 RBC ANTIBODY SCREEN: CPT | Performed by: PHYSICIAN ASSISTANT

## 2017-01-20 PROCEDURE — 93005 ELECTROCARDIOGRAM TRACING: CPT | Performed by: RADIOLOGY

## 2017-01-20 PROCEDURE — 99153 MOD SED SAME PHYS/QHP EA: CPT

## 2017-01-20 PROCEDURE — 85025 COMPLETE CBC W/AUTO DIFF WBC: CPT | Performed by: STUDENT IN AN ORGANIZED HEALTH CARE EDUCATION/TRAINING PROGRAM

## 2017-01-20 PROCEDURE — 86901 BLOOD TYPING SEROLOGIC RH(D): CPT | Performed by: PHYSICIAN ASSISTANT

## 2017-01-20 PROCEDURE — 85014 HEMATOCRIT: CPT | Performed by: PHYSICIAN ASSISTANT

## 2017-01-20 PROCEDURE — 85018 HEMOGLOBIN: CPT | Performed by: PHYSICIAN ASSISTANT

## 2017-01-20 PROCEDURE — 75726 ARTERY X-RAYS ABDOMEN: CPT

## 2017-01-20 PROCEDURE — C1894 INTRO/SHEATH, NON-LASER: HCPCS

## 2017-01-20 PROCEDURE — 36245 INS CATH ABD/L-EXT ART 1ST: CPT

## 2017-01-20 PROCEDURE — B415YZZ FLUOROSCOPY OF INFERIOR MESENTERIC ARTERY USING OTHER CONTRAST: ICD-10-PCS | Performed by: RADIOLOGY

## 2017-01-20 PROCEDURE — B414YZZ FLUOROSCOPY OF SUPERIOR MESENTERIC ARTERY USING OTHER CONTRAST: ICD-10-PCS | Performed by: RADIOLOGY

## 2017-01-20 PROCEDURE — 85610 PROTHROMBIN TIME: CPT | Performed by: PHYSICIAN ASSISTANT

## 2017-01-20 PROCEDURE — C1769 GUIDE WIRE: HCPCS

## 2017-01-20 PROCEDURE — 86920 COMPATIBILITY TEST SPIN: CPT

## 2017-01-20 PROCEDURE — 36248 INS CATH ABD/L-EXT ART ADDL: CPT

## 2017-01-20 PROCEDURE — 86900 BLOOD TYPING SEROLOGIC ABO: CPT | Performed by: PHYSICIAN ASSISTANT

## 2017-01-20 PROCEDURE — 99152 MOD SED SAME PHYS/QHP 5/>YRS: CPT

## 2017-01-20 PROCEDURE — C1760 CLOSURE DEV, VASC: HCPCS

## 2017-01-20 PROCEDURE — 80053 COMPREHEN METABOLIC PANEL: CPT | Performed by: PHYSICIAN ASSISTANT

## 2017-01-20 PROCEDURE — C1887 CATHETER, GUIDING: HCPCS

## 2017-01-20 PROCEDURE — C9113 INJ PANTOPRAZOLE SODIUM, VIA: HCPCS | Performed by: PHYSICIAN ASSISTANT

## 2017-01-20 RX ORDER — POTASSIUM CHLORIDE 14.9 MG/ML
20 INJECTION INTRAVENOUS ONCE
Status: COMPLETED | OUTPATIENT
Start: 2017-01-20 | End: 2017-01-20

## 2017-01-20 RX ORDER — FENTANYL CITRATE 50 UG/ML
INJECTION, SOLUTION INTRAMUSCULAR; INTRAVENOUS CODE/TRAUMA/SEDATION MEDICATION
Status: COMPLETED | OUTPATIENT
Start: 2017-01-20 | End: 2017-01-20

## 2017-01-20 RX ORDER — MIDAZOLAM HYDROCHLORIDE 1 MG/ML
INJECTION INTRAMUSCULAR; INTRAVENOUS CODE/TRAUMA/SEDATION MEDICATION
Status: COMPLETED | OUTPATIENT
Start: 2017-01-20 | End: 2017-01-20

## 2017-01-20 RX ORDER — POTASSIUM CHLORIDE 29.8 MG/ML
40 INJECTION INTRAVENOUS ONCE
Status: DISCONTINUED | OUTPATIENT
Start: 2017-01-20 | End: 2017-01-20

## 2017-01-20 RX ORDER — MAGNESIUM SULFATE HEPTAHYDRATE 40 MG/ML
2 INJECTION, SOLUTION INTRAVENOUS ONCE
Status: COMPLETED | OUTPATIENT
Start: 2017-01-20 | End: 2017-01-20

## 2017-01-20 RX ORDER — HYDRALAZINE HYDROCHLORIDE 20 MG/ML
5 INJECTION INTRAMUSCULAR; INTRAVENOUS EVERY 6 HOURS PRN
Status: DISCONTINUED | OUTPATIENT
Start: 2017-01-20 | End: 2017-01-25 | Stop reason: HOSPADM

## 2017-01-20 RX ORDER — LIDOCAINE HYDROCHLORIDE 10 MG/ML
INJECTION, SOLUTION INFILTRATION; PERINEURAL CODE/TRAUMA/SEDATION MEDICATION
Status: DISCONTINUED | OUTPATIENT
Start: 2017-01-20 | End: 2017-01-25 | Stop reason: HOSPADM

## 2017-01-20 RX ADMIN — DONEPEZIL HYDROCHLORIDE 10 MG: 5 TABLET, FILM COATED ORAL at 23:09

## 2017-01-20 RX ADMIN — METOPROLOL TARTRATE 50 MG: 50 TABLET ORAL at 08:45

## 2017-01-20 RX ADMIN — LOSARTAN POTASSIUM 100 MG: 50 TABLET, FILM COATED ORAL at 08:45

## 2017-01-20 RX ADMIN — Medication 1 TABLET: at 08:45

## 2017-01-20 RX ADMIN — AMLODIPINE BESYLATE 2.5 MG: 2.5 TABLET ORAL at 08:45

## 2017-01-20 RX ADMIN — POTASSIUM CHLORIDE 20 MEQ: 200 INJECTION, SOLUTION INTRAVENOUS at 08:47

## 2017-01-20 RX ADMIN — MAGNESIUM SULFATE HEPTAHYDRATE 2 G: 40 INJECTION, SOLUTION INTRAVENOUS at 08:48

## 2017-01-20 RX ADMIN — METRONIDAZOLE 500 MG: 500 INJECTION, SOLUTION INTRAVENOUS at 15:35

## 2017-01-20 RX ADMIN — CIPROFLOXACIN 400 MG: 2 INJECTION, SOLUTION INTRAVENOUS at 16:17

## 2017-01-20 RX ADMIN — IODIXANOL 50 ML: 320 INJECTION, SOLUTION INTRAVASCULAR at 22:19

## 2017-01-20 RX ADMIN — POTASSIUM CHLORIDE 20 MEQ: 200 INJECTION, SOLUTION INTRAVENOUS at 11:03

## 2017-01-20 RX ADMIN — MIDAZOLAM HYDROCHLORIDE 1 MG: 1 INJECTION, SOLUTION INTRAMUSCULAR; INTRAVENOUS at 21:03

## 2017-01-20 RX ADMIN — GABAPENTIN 300 MG: 300 CAPSULE ORAL at 08:44

## 2017-01-20 RX ADMIN — METRONIDAZOLE 500 MG: 500 INJECTION, SOLUTION INTRAVENOUS at 08:45

## 2017-01-20 RX ADMIN — PRAVASTATIN SODIUM 20 MG: 20 TABLET ORAL at 16:17

## 2017-01-20 RX ADMIN — FENTANYL CITRATE 50 MCG: 50 INJECTION, SOLUTION INTRAMUSCULAR; INTRAVENOUS at 21:05

## 2017-01-20 RX ADMIN — CIPROFLOXACIN 400 MG: 2 INJECTION, SOLUTION INTRAVENOUS at 04:23

## 2017-01-20 RX ADMIN — PANTOPRAZOLE SODIUM 40 MG: 40 INJECTION, POWDER, FOR SOLUTION INTRAVENOUS at 08:48

## 2017-01-20 RX ADMIN — LIDOCAINE HYDROCHLORIDE 4 ML: 10 INJECTION, SOLUTION INFILTRATION; PERINEURAL at 22:00

## 2017-01-20 RX ADMIN — PANTOPRAZOLE SODIUM 40 MG: 40 INJECTION, POWDER, FOR SOLUTION INTRAVENOUS at 23:09

## 2017-01-20 RX ADMIN — OXYCODONE HYDROCHLORIDE AND ACETAMINOPHEN 500 MG: 500 TABLET ORAL at 08:44

## 2017-01-21 LAB
ALBUMIN SERPL BCP-MCNC: 2.2 G/DL (ref 3.5–5)
ALP SERPL-CCNC: 34 U/L (ref 46–116)
ALT SERPL W P-5'-P-CCNC: 23 U/L (ref 12–78)
ANION GAP SERPL CALCULATED.3IONS-SCNC: 11 MMOL/L (ref 4–13)
AST SERPL W P-5'-P-CCNC: 34 U/L (ref 5–45)
BASOPHILS # BLD AUTO: 0.3 THOUSANDS/ΜL (ref 0–0.1)
BASOPHILS NFR BLD AUTO: 3 % (ref 0–1)
BILIRUB SERPL-MCNC: 0.5 MG/DL (ref 0.2–1)
BUN SERPL-MCNC: 7 MG/DL (ref 5–25)
CALCIUM SERPL-MCNC: 7.5 MG/DL (ref 8.3–10.1)
CHLORIDE SERPL-SCNC: 108 MMOL/L (ref 100–108)
CO2 SERPL-SCNC: 22 MMOL/L (ref 21–32)
CREAT SERPL-MCNC: 1.09 MG/DL (ref 0.6–1.3)
EOSINOPHIL # BLD AUTO: 0.6 THOUSAND/ΜL (ref 0–0.61)
EOSINOPHIL NFR BLD AUTO: 5 % (ref 0–6)
ERYTHROCYTE [DISTWIDTH] IN BLOOD BY AUTOMATED COUNT: 15.6 % (ref 11.6–15.1)
GFR SERPL CREATININE-BSD FRML MDRD: >60 ML/MIN/1.73SQ M
GLUCOSE SERPL-MCNC: 102 MG/DL (ref 65–140)
HCT VFR BLD AUTO: 27 % (ref 42–52)
HCT VFR BLD AUTO: 30.1 % (ref 42–52)
HGB BLD-MCNC: 8.9 G/DL (ref 14–18)
HGB BLD-MCNC: 9.6 G/DL (ref 14–18)
INR PPP: 1.27 (ref 0.86–1.16)
LYMPHOCYTES # BLD AUTO: 1.1 THOUSANDS/ΜL (ref 0.6–4.47)
LYMPHOCYTES NFR BLD AUTO: 10 % (ref 14–44)
MAGNESIUM SERPL-MCNC: 1.8 MG/DL (ref 1.6–2.6)
MCH RBC QN AUTO: 28.6 PG (ref 27–31)
MCHC RBC AUTO-ENTMCNC: 32.9 G/DL (ref 31.4–37.4)
MCV RBC AUTO: 87 FL (ref 82–98)
MONOCYTES # BLD AUTO: 0.7 THOUSAND/ΜL (ref 0.17–1.22)
MONOCYTES NFR BLD AUTO: 6 % (ref 4–12)
NEUTROPHILS # BLD AUTO: 8.1 THOUSANDS/ΜL (ref 1.85–7.62)
NEUTS SEG NFR BLD AUTO: 76 % (ref 43–75)
NRBC BLD AUTO-RTO: 0 /100 WBCS
PLATELET # BLD AUTO: 295 THOUSANDS/UL (ref 130–400)
PMV BLD AUTO: 7.4 FL (ref 8.9–12.7)
POTASSIUM SERPL-SCNC: 3.5 MMOL/L (ref 3.5–5.3)
PROT SERPL-MCNC: 4.7 G/DL (ref 6.4–8.2)
PROTHROMBIN TIME: 13.4 SECONDS (ref 9.4–11.7)
RBC # BLD AUTO: 3.11 MILLION/UL (ref 4.7–6.1)
SODIUM SERPL-SCNC: 141 MMOL/L (ref 136–145)
WBC # BLD AUTO: 10.7 THOUSAND/UL (ref 4.8–10.8)

## 2017-01-21 PROCEDURE — 85025 COMPLETE CBC W/AUTO DIFF WBC: CPT | Performed by: PHYSICIAN ASSISTANT

## 2017-01-21 PROCEDURE — 85014 HEMATOCRIT: CPT | Performed by: INTERNAL MEDICINE

## 2017-01-21 PROCEDURE — 85018 HEMOGLOBIN: CPT | Performed by: PHYSICIAN ASSISTANT

## 2017-01-21 PROCEDURE — 80053 COMPREHEN METABOLIC PANEL: CPT | Performed by: PHYSICIAN ASSISTANT

## 2017-01-21 PROCEDURE — 85610 PROTHROMBIN TIME: CPT | Performed by: PHYSICIAN ASSISTANT

## 2017-01-21 PROCEDURE — 85018 HEMOGLOBIN: CPT | Performed by: INTERNAL MEDICINE

## 2017-01-21 PROCEDURE — C9113 INJ PANTOPRAZOLE SODIUM, VIA: HCPCS | Performed by: PHYSICIAN ASSISTANT

## 2017-01-21 PROCEDURE — 85014 HEMATOCRIT: CPT | Performed by: PHYSICIAN ASSISTANT

## 2017-01-21 PROCEDURE — 83735 ASSAY OF MAGNESIUM: CPT | Performed by: PHYSICIAN ASSISTANT

## 2017-01-21 RX ORDER — POTASSIUM CHLORIDE 29.8 MG/ML
40 INJECTION INTRAVENOUS ONCE
Status: COMPLETED | OUTPATIENT
Start: 2017-01-21 | End: 2017-01-22

## 2017-01-21 RX ORDER — METOPROLOL TARTRATE 50 MG/1
50 TABLET, FILM COATED ORAL EVERY 12 HOURS SCHEDULED
Status: DISCONTINUED | OUTPATIENT
Start: 2017-01-21 | End: 2017-01-25 | Stop reason: HOSPADM

## 2017-01-21 RX ORDER — LOSARTAN POTASSIUM 50 MG/1
100 TABLET ORAL DAILY
Status: DISCONTINUED | OUTPATIENT
Start: 2017-01-22 | End: 2017-01-25 | Stop reason: HOSPADM

## 2017-01-21 RX ORDER — POTASSIUM CHLORIDE 20MEQ/15ML
40 LIQUID (ML) ORAL ONCE
Status: DISCONTINUED | OUTPATIENT
Start: 2017-01-21 | End: 2017-01-21

## 2017-01-21 RX ORDER — POTASSIUM CHLORIDE 14.9 MG/ML
20 INJECTION INTRAVENOUS ONCE
Status: COMPLETED | OUTPATIENT
Start: 2017-01-21 | End: 2017-01-21

## 2017-01-21 RX ADMIN — CIPROFLOXACIN 400 MG: 2 INJECTION, SOLUTION INTRAVENOUS at 16:55

## 2017-01-21 RX ADMIN — POTASSIUM CHLORIDE 20 MEQ: 200 INJECTION, SOLUTION INTRAVENOUS at 05:24

## 2017-01-21 RX ADMIN — POTASSIUM CHLORIDE 40 MEQ: 400 INJECTION, SOLUTION INTRAVENOUS at 20:18

## 2017-01-21 RX ADMIN — DONEPEZIL HYDROCHLORIDE 10 MG: 5 TABLET, FILM COATED ORAL at 22:19

## 2017-01-21 RX ADMIN — METRONIDAZOLE 500 MG: 500 INJECTION, SOLUTION INTRAVENOUS at 00:36

## 2017-01-21 RX ADMIN — ONDANSETRON 4 MG: 2 INJECTION INTRAMUSCULAR; INTRAVENOUS at 05:56

## 2017-01-21 RX ADMIN — PRAVASTATIN SODIUM 20 MG: 20 TABLET ORAL at 16:57

## 2017-01-21 RX ADMIN — HYDRALAZINE HYDROCHLORIDE 5 MG: 20 INJECTION INTRAMUSCULAR; INTRAVENOUS at 11:17

## 2017-01-21 RX ADMIN — METRONIDAZOLE 500 MG: 500 INJECTION, SOLUTION INTRAVENOUS at 16:56

## 2017-01-21 RX ADMIN — GABAPENTIN 300 MG: 300 CAPSULE ORAL at 18:27

## 2017-01-21 RX ADMIN — SODIUM CHLORIDE 50 ML/HR: 0.9 INJECTION, SOLUTION INTRAVENOUS at 00:37

## 2017-01-21 RX ADMIN — GABAPENTIN 300 MG: 300 CAPSULE ORAL at 09:17

## 2017-01-21 RX ADMIN — METRONIDAZOLE 500 MG: 500 INJECTION, SOLUTION INTRAVENOUS at 09:29

## 2017-01-21 RX ADMIN — PANTOPRAZOLE SODIUM 40 MG: 40 INJECTION, POWDER, FOR SOLUTION INTRAVENOUS at 20:17

## 2017-01-21 RX ADMIN — OXYCODONE HYDROCHLORIDE AND ACETAMINOPHEN 500 MG: 500 TABLET ORAL at 09:17

## 2017-01-21 RX ADMIN — METOPROLOL TARTRATE 50 MG: 50 TABLET ORAL at 20:17

## 2017-01-21 RX ADMIN — CIPROFLOXACIN 400 MG: 2 INJECTION, SOLUTION INTRAVENOUS at 04:00

## 2017-01-21 RX ADMIN — HYDRALAZINE HYDROCHLORIDE 5 MG: 20 INJECTION INTRAMUSCULAR; INTRAVENOUS at 02:28

## 2017-01-21 RX ADMIN — PANTOPRAZOLE SODIUM 40 MG: 40 INJECTION, POWDER, FOR SOLUTION INTRAVENOUS at 09:18

## 2017-01-21 RX ADMIN — Medication 1 TABLET: at 09:29

## 2017-01-22 LAB
ALBUMIN SERPL BCP-MCNC: 2.1 G/DL (ref 3.5–5)
ALP SERPL-CCNC: 30 U/L (ref 46–116)
ALT SERPL W P-5'-P-CCNC: 22 U/L (ref 12–78)
ANION GAP SERPL CALCULATED.3IONS-SCNC: 6 MMOL/L (ref 4–13)
AST SERPL W P-5'-P-CCNC: 30 U/L (ref 5–45)
ATRIAL RATE: 71 BPM
ATRIAL RATE: 97 BPM
BASOPHILS # BLD AUTO: 0.3 THOUSANDS/ΜL (ref 0–0.1)
BASOPHILS NFR BLD AUTO: 3 % (ref 0–1)
BILIRUB SERPL-MCNC: 0.4 MG/DL (ref 0.2–1)
BUN SERPL-MCNC: 6 MG/DL (ref 5–25)
CALCIUM SERPL-MCNC: 7.7 MG/DL (ref 8.3–10.1)
CHLORIDE SERPL-SCNC: 110 MMOL/L (ref 100–108)
CO2 SERPL-SCNC: 25 MMOL/L (ref 21–32)
CREAT SERPL-MCNC: 1.22 MG/DL (ref 0.6–1.3)
EOSINOPHIL # BLD AUTO: 0.6 THOUSAND/ΜL (ref 0–0.61)
EOSINOPHIL NFR BLD AUTO: 7 % (ref 0–6)
ERYTHROCYTE [DISTWIDTH] IN BLOOD BY AUTOMATED COUNT: 15.9 % (ref 11.6–15.1)
GFR SERPL CREATININE-BSD FRML MDRD: 55.9 ML/MIN/1.73SQ M
GLUCOSE SERPL-MCNC: 137 MG/DL (ref 65–140)
HCT VFR BLD AUTO: 25.7 % (ref 42–52)
HCT VFR BLD AUTO: 25.9 % (ref 42–52)
HCT VFR BLD AUTO: 26.1 % (ref 42–52)
HGB BLD-MCNC: 8.3 G/DL (ref 14–18)
HGB BLD-MCNC: 8.5 G/DL (ref 14–18)
HGB BLD-MCNC: 8.6 G/DL (ref 14–18)
INR PPP: 1.26 (ref 0.86–1.16)
LYMPHOCYTES # BLD AUTO: 1.1 THOUSANDS/ΜL (ref 0.6–4.47)
LYMPHOCYTES NFR BLD AUTO: 12 % (ref 14–44)
MAGNESIUM SERPL-MCNC: 1.9 MG/DL (ref 1.6–2.6)
MCH RBC QN AUTO: 28.8 PG (ref 27–31)
MCHC RBC AUTO-ENTMCNC: 32.7 G/DL (ref 31.4–37.4)
MCV RBC AUTO: 88 FL (ref 82–98)
MONOCYTES # BLD AUTO: 0.7 THOUSAND/ΜL (ref 0.17–1.22)
MONOCYTES NFR BLD AUTO: 8 % (ref 4–12)
NEUTROPHILS # BLD AUTO: 6.2 THOUSANDS/ΜL (ref 1.85–7.62)
NEUTS SEG NFR BLD AUTO: 70 % (ref 43–75)
NRBC BLD AUTO-RTO: 0 /100 WBCS
P AXIS: 7 DEGREES
P AXIS: 73 DEGREES
PHOSPHATE SERPL-MCNC: 3 MG/DL (ref 2.3–4.1)
PLATELET # BLD AUTO: 319 THOUSANDS/UL (ref 130–400)
PMV BLD AUTO: 7.4 FL (ref 8.9–12.7)
POTASSIUM SERPL-SCNC: 4.1 MMOL/L (ref 3.5–5.3)
PR INTERVAL: 178 MS
PR INTERVAL: 206 MS
PROT SERPL-MCNC: 4.7 G/DL (ref 6.4–8.2)
PROTHROMBIN TIME: 13.3 SECONDS (ref 9.4–11.7)
QRS AXIS: -14 DEGREES
QRS AXIS: -14 DEGREES
QRSD INTERVAL: 80 MS
QRSD INTERVAL: 82 MS
QT INTERVAL: 432 MS
QT INTERVAL: 474 MS
QTC INTERVAL: 469 MS
QTC INTERVAL: 540 MS
RBC # BLD AUTO: 2.94 MILLION/UL (ref 4.7–6.1)
SODIUM SERPL-SCNC: 141 MMOL/L (ref 136–145)
T WAVE AXIS: 17 DEGREES
T WAVE AXIS: 23 DEGREES
VENTRICULAR RATE: 71 BPM
VENTRICULAR RATE: 78 BPM
WBC # BLD AUTO: 8.9 THOUSAND/UL (ref 4.8–10.8)

## 2017-01-22 PROCEDURE — C9113 INJ PANTOPRAZOLE SODIUM, VIA: HCPCS | Performed by: PHYSICIAN ASSISTANT

## 2017-01-22 PROCEDURE — 85018 HEMOGLOBIN: CPT | Performed by: FAMILY MEDICINE

## 2017-01-22 PROCEDURE — 80053 COMPREHEN METABOLIC PANEL: CPT | Performed by: PHYSICIAN ASSISTANT

## 2017-01-22 PROCEDURE — 83735 ASSAY OF MAGNESIUM: CPT | Performed by: PHYSICIAN ASSISTANT

## 2017-01-22 PROCEDURE — 85014 HEMATOCRIT: CPT | Performed by: FAMILY MEDICINE

## 2017-01-22 PROCEDURE — 84100 ASSAY OF PHOSPHORUS: CPT | Performed by: PHYSICIAN ASSISTANT

## 2017-01-22 PROCEDURE — 85025 COMPLETE CBC W/AUTO DIFF WBC: CPT | Performed by: PHYSICIAN ASSISTANT

## 2017-01-22 PROCEDURE — 85610 PROTHROMBIN TIME: CPT | Performed by: PHYSICIAN ASSISTANT

## 2017-01-22 RX ADMIN — Medication 1 TABLET: at 09:23

## 2017-01-22 RX ADMIN — DONEPEZIL HYDROCHLORIDE 10 MG: 5 TABLET, FILM COATED ORAL at 22:42

## 2017-01-22 RX ADMIN — METOPROLOL TARTRATE 50 MG: 50 TABLET ORAL at 09:23

## 2017-01-22 RX ADMIN — OXYCODONE HYDROCHLORIDE AND ACETAMINOPHEN 500 MG: 500 TABLET ORAL at 09:23

## 2017-01-22 RX ADMIN — METRONIDAZOLE 500 MG: 500 INJECTION, SOLUTION INTRAVENOUS at 09:21

## 2017-01-22 RX ADMIN — LOSARTAN POTASSIUM 100 MG: 50 TABLET, FILM COATED ORAL at 09:24

## 2017-01-22 RX ADMIN — CIPROFLOXACIN 400 MG: 2 INJECTION, SOLUTION INTRAVENOUS at 04:41

## 2017-01-22 RX ADMIN — METRONIDAZOLE 500 MG: 500 INJECTION, SOLUTION INTRAVENOUS at 00:10

## 2017-01-22 RX ADMIN — PANTOPRAZOLE SODIUM 40 MG: 40 INJECTION, POWDER, FOR SOLUTION INTRAVENOUS at 09:23

## 2017-01-22 RX ADMIN — PANTOPRAZOLE SODIUM 40 MG: 40 INJECTION, POWDER, FOR SOLUTION INTRAVENOUS at 20:40

## 2017-01-22 RX ADMIN — CIPROFLOXACIN 400 MG: 2 INJECTION, SOLUTION INTRAVENOUS at 15:48

## 2017-01-22 RX ADMIN — GABAPENTIN 300 MG: 300 CAPSULE ORAL at 09:23

## 2017-01-22 RX ADMIN — METRONIDAZOLE 500 MG: 500 INJECTION, SOLUTION INTRAVENOUS at 23:57

## 2017-01-22 RX ADMIN — METOPROLOL TARTRATE 50 MG: 50 TABLET ORAL at 20:40

## 2017-01-22 RX ADMIN — PRAVASTATIN SODIUM 20 MG: 20 TABLET ORAL at 15:48

## 2017-01-22 RX ADMIN — GABAPENTIN 300 MG: 300 CAPSULE ORAL at 17:57

## 2017-01-22 RX ADMIN — METRONIDAZOLE 500 MG: 500 INJECTION, SOLUTION INTRAVENOUS at 15:48

## 2017-01-23 LAB
ALBUMIN SERPL BCP-MCNC: 2.1 G/DL (ref 3.5–5)
ALP SERPL-CCNC: 33 U/L (ref 46–116)
ALT SERPL W P-5'-P-CCNC: 24 U/L (ref 12–78)
ANION GAP SERPL CALCULATED.3IONS-SCNC: 7 MMOL/L (ref 4–13)
AST SERPL W P-5'-P-CCNC: 32 U/L (ref 5–45)
BASOPHILS # BLD AUTO: 0.4 THOUSANDS/ΜL (ref 0–0.1)
BASOPHILS NFR BLD AUTO: 5 % (ref 0–1)
BILIRUB SERPL-MCNC: 0.4 MG/DL (ref 0.2–1)
BUN SERPL-MCNC: 5 MG/DL (ref 5–25)
CALCIUM SERPL-MCNC: 7.8 MG/DL (ref 8.3–10.1)
CHLORIDE SERPL-SCNC: 109 MMOL/L (ref 100–108)
CO2 SERPL-SCNC: 26 MMOL/L (ref 21–32)
CREAT SERPL-MCNC: 1.07 MG/DL (ref 0.6–1.3)
EOSINOPHIL # BLD AUTO: 0.8 THOUSAND/ΜL (ref 0–0.61)
EOSINOPHIL NFR BLD AUTO: 10 % (ref 0–6)
ERYTHROCYTE [DISTWIDTH] IN BLOOD BY AUTOMATED COUNT: 16 % (ref 11.6–15.1)
GFR SERPL CREATININE-BSD FRML MDRD: >60 ML/MIN/1.73SQ M
GLUCOSE SERPL-MCNC: 101 MG/DL (ref 65–140)
HCT VFR BLD AUTO: 26.6 % (ref 42–52)
HGB BLD-MCNC: 8.7 G/DL (ref 14–18)
INR PPP: 1.24 (ref 0.86–1.16)
LYMPHOCYTES # BLD AUTO: 1.1 THOUSANDS/ΜL (ref 0.6–4.47)
LYMPHOCYTES NFR BLD AUTO: 15 % (ref 14–44)
MAGNESIUM SERPL-MCNC: 1.8 MG/DL (ref 1.6–2.6)
MCH RBC QN AUTO: 28.7 PG (ref 27–31)
MCHC RBC AUTO-ENTMCNC: 32.6 G/DL (ref 31.4–37.4)
MCV RBC AUTO: 88 FL (ref 82–98)
MONOCYTES # BLD AUTO: 0.7 THOUSAND/ΜL (ref 0.17–1.22)
MONOCYTES NFR BLD AUTO: 9 % (ref 4–12)
NEUTROPHILS # BLD AUTO: 4.8 THOUSANDS/ΜL (ref 1.85–7.62)
NEUTS SEG NFR BLD AUTO: 62 % (ref 43–75)
NRBC BLD AUTO-RTO: 0 /100 WBCS
PHOSPHATE SERPL-MCNC: 3.2 MG/DL (ref 2.3–4.1)
PLATELET # BLD AUTO: 300 THOUSANDS/UL (ref 130–400)
PMV BLD AUTO: 7.6 FL (ref 8.9–12.7)
POTASSIUM SERPL-SCNC: 3.5 MMOL/L (ref 3.5–5.3)
PROT SERPL-MCNC: 4.9 G/DL (ref 6.4–8.2)
PROTHROMBIN TIME: 13.1 SECONDS (ref 9.4–11.7)
RBC # BLD AUTO: 3.02 MILLION/UL (ref 4.7–6.1)
SODIUM SERPL-SCNC: 142 MMOL/L (ref 136–145)
WBC # BLD AUTO: 7.8 THOUSAND/UL (ref 4.8–10.8)

## 2017-01-23 PROCEDURE — 97110 THERAPEUTIC EXERCISES: CPT

## 2017-01-23 PROCEDURE — 83735 ASSAY OF MAGNESIUM: CPT | Performed by: PHYSICIAN ASSISTANT

## 2017-01-23 PROCEDURE — 80053 COMPREHEN METABOLIC PANEL: CPT | Performed by: PHYSICIAN ASSISTANT

## 2017-01-23 PROCEDURE — C9113 INJ PANTOPRAZOLE SODIUM, VIA: HCPCS | Performed by: PHYSICIAN ASSISTANT

## 2017-01-23 PROCEDURE — 85025 COMPLETE CBC W/AUTO DIFF WBC: CPT | Performed by: PHYSICIAN ASSISTANT

## 2017-01-23 PROCEDURE — 84100 ASSAY OF PHOSPHORUS: CPT | Performed by: PHYSICIAN ASSISTANT

## 2017-01-23 PROCEDURE — 85610 PROTHROMBIN TIME: CPT | Performed by: PHYSICIAN ASSISTANT

## 2017-01-23 RX ORDER — POTASSIUM CHLORIDE 20 MEQ/1
40 TABLET, EXTENDED RELEASE ORAL ONCE
Status: COMPLETED | OUTPATIENT
Start: 2017-01-23 | End: 2017-01-23

## 2017-01-23 RX ADMIN — METOPROLOL TARTRATE 50 MG: 50 TABLET ORAL at 08:27

## 2017-01-23 RX ADMIN — PANTOPRAZOLE SODIUM 40 MG: 40 INJECTION, POWDER, FOR SOLUTION INTRAVENOUS at 20:17

## 2017-01-23 RX ADMIN — GABAPENTIN 300 MG: 300 CAPSULE ORAL at 08:27

## 2017-01-23 RX ADMIN — METRONIDAZOLE 500 MG: 500 INJECTION, SOLUTION INTRAVENOUS at 08:27

## 2017-01-23 RX ADMIN — CIPROFLOXACIN 400 MG: 2 INJECTION, SOLUTION INTRAVENOUS at 04:42

## 2017-01-23 RX ADMIN — LOSARTAN POTASSIUM 100 MG: 50 TABLET, FILM COATED ORAL at 08:27

## 2017-01-23 RX ADMIN — GABAPENTIN 300 MG: 300 CAPSULE ORAL at 17:09

## 2017-01-23 RX ADMIN — DONEPEZIL HYDROCHLORIDE 10 MG: 5 TABLET, FILM COATED ORAL at 23:08

## 2017-01-23 RX ADMIN — Medication 1 TABLET: at 08:27

## 2017-01-23 RX ADMIN — METRONIDAZOLE 500 MG: 500 INJECTION, SOLUTION INTRAVENOUS at 17:09

## 2017-01-23 RX ADMIN — PANTOPRAZOLE SODIUM 40 MG: 40 INJECTION, POWDER, FOR SOLUTION INTRAVENOUS at 08:27

## 2017-01-23 RX ADMIN — METOPROLOL TARTRATE 50 MG: 50 TABLET ORAL at 20:17

## 2017-01-23 RX ADMIN — POTASSIUM CHLORIDE 40 MEQ: 1500 TABLET, EXTENDED RELEASE ORAL at 20:17

## 2017-01-23 RX ADMIN — PRAVASTATIN SODIUM 20 MG: 20 TABLET ORAL at 15:48

## 2017-01-23 RX ADMIN — METRONIDAZOLE 500 MG: 500 INJECTION, SOLUTION INTRAVENOUS at 23:12

## 2017-01-23 RX ADMIN — CIPROFLOXACIN 400 MG: 2 INJECTION, SOLUTION INTRAVENOUS at 15:48

## 2017-01-23 RX ADMIN — OXYCODONE HYDROCHLORIDE AND ACETAMINOPHEN 500 MG: 500 TABLET ORAL at 08:27

## 2017-01-24 LAB
ABO GROUP BLD BPU: NORMAL
ABO GROUP BLD BPU: NORMAL
ALBUMIN SERPL BCP-MCNC: 1.9 G/DL (ref 3.5–5)
ALP SERPL-CCNC: 42 U/L (ref 46–116)
ALT SERPL W P-5'-P-CCNC: 24 U/L (ref 12–78)
ANION GAP SERPL CALCULATED.3IONS-SCNC: 8 MMOL/L (ref 4–13)
AST SERPL W P-5'-P-CCNC: 28 U/L (ref 5–45)
ATRIAL RATE: 79 BPM
BILIRUB SERPL-MCNC: 0.2 MG/DL (ref 0.2–1)
BPU ID: NORMAL
BPU ID: NORMAL
BUN SERPL-MCNC: 10 MG/DL (ref 5–25)
CALCIUM SERPL-MCNC: 7.5 MG/DL (ref 8.3–10.1)
CHLORIDE SERPL-SCNC: 108 MMOL/L (ref 100–108)
CO2 SERPL-SCNC: 25 MMOL/L (ref 21–32)
CREAT SERPL-MCNC: 1.27 MG/DL (ref 0.6–1.3)
CROSSMATCH: NORMAL
CROSSMATCH: NORMAL
GFR SERPL CREATININE-BSD FRML MDRD: 53.4 ML/MIN/1.73SQ M
GLUCOSE SERPL-MCNC: 103 MG/DL (ref 65–140)
HCT VFR BLD AUTO: 25.7 % (ref 42–52)
HGB BLD-MCNC: 8.4 G/DL (ref 14–18)
P AXIS: 69 DEGREES
POTASSIUM SERPL-SCNC: 3.7 MMOL/L (ref 3.5–5.3)
PR INTERVAL: 192 MS
PROT SERPL-MCNC: 4.7 G/DL (ref 6.4–8.2)
QRS AXIS: 19 DEGREES
QRSD INTERVAL: 82 MS
QT INTERVAL: 416 MS
QTC INTERVAL: 477 MS
SODIUM SERPL-SCNC: 141 MMOL/L (ref 136–145)
T WAVE AXIS: 2 DEGREES
UNIT DISPENSE STATUS: NORMAL
UNIT DISPENSE STATUS: NORMAL
UNIT PRODUCT CODE: NORMAL
UNIT PRODUCT CODE: NORMAL
UNIT RH: NORMAL
UNIT RH: NORMAL
VENTRICULAR RATE: 79 BPM

## 2017-01-24 PROCEDURE — 97110 THERAPEUTIC EXERCISES: CPT

## 2017-01-24 PROCEDURE — C9113 INJ PANTOPRAZOLE SODIUM, VIA: HCPCS | Performed by: PHYSICIAN ASSISTANT

## 2017-01-24 PROCEDURE — 85014 HEMATOCRIT: CPT | Performed by: FAMILY MEDICINE

## 2017-01-24 PROCEDURE — 85018 HEMOGLOBIN: CPT | Performed by: FAMILY MEDICINE

## 2017-01-24 PROCEDURE — 80053 COMPREHEN METABOLIC PANEL: CPT | Performed by: PHYSICIAN ASSISTANT

## 2017-01-24 RX ORDER — METRONIDAZOLE 500 MG/1
500 TABLET ORAL 3 TIMES DAILY
Qty: 18 TABLET | Refills: 0 | Status: SHIPPED | OUTPATIENT
Start: 2017-01-24 | End: 2017-01-30

## 2017-01-24 RX ORDER — CIPROFLOXACIN 500 MG/1
500 TABLET, FILM COATED ORAL 2 TIMES DAILY
Qty: 12 TABLET | Refills: 0 | Status: SHIPPED | OUTPATIENT
Start: 2017-01-24 | End: 2017-01-30

## 2017-01-24 RX ADMIN — METRONIDAZOLE 500 MG: 500 INJECTION, SOLUTION INTRAVENOUS at 23:10

## 2017-01-24 RX ADMIN — CIPROFLOXACIN 400 MG: 2 INJECTION, SOLUTION INTRAVENOUS at 03:02

## 2017-01-24 RX ADMIN — LOSARTAN POTASSIUM 100 MG: 50 TABLET, FILM COATED ORAL at 08:47

## 2017-01-24 RX ADMIN — PANTOPRAZOLE SODIUM 40 MG: 40 INJECTION, POWDER, FOR SOLUTION INTRAVENOUS at 08:48

## 2017-01-24 RX ADMIN — OXYCODONE HYDROCHLORIDE AND ACETAMINOPHEN 500 MG: 500 TABLET ORAL at 08:48

## 2017-01-24 RX ADMIN — PANTOPRAZOLE SODIUM 40 MG: 40 INJECTION, POWDER, FOR SOLUTION INTRAVENOUS at 22:27

## 2017-01-24 RX ADMIN — GABAPENTIN 300 MG: 300 CAPSULE ORAL at 08:47

## 2017-01-24 RX ADMIN — METRONIDAZOLE 500 MG: 500 INJECTION, SOLUTION INTRAVENOUS at 08:52

## 2017-01-24 RX ADMIN — PRAVASTATIN SODIUM 20 MG: 20 TABLET ORAL at 17:56

## 2017-01-24 RX ADMIN — METRONIDAZOLE 500 MG: 500 INJECTION, SOLUTION INTRAVENOUS at 15:18

## 2017-01-24 RX ADMIN — METOPROLOL TARTRATE 50 MG: 50 TABLET ORAL at 08:48

## 2017-01-24 RX ADMIN — GABAPENTIN 300 MG: 300 CAPSULE ORAL at 17:56

## 2017-01-24 RX ADMIN — METOPROLOL TARTRATE 50 MG: 50 TABLET ORAL at 22:26

## 2017-01-24 RX ADMIN — CIPROFLOXACIN 400 MG: 2 INJECTION, SOLUTION INTRAVENOUS at 17:56

## 2017-01-24 RX ADMIN — DONEPEZIL HYDROCHLORIDE 10 MG: 5 TABLET, FILM COATED ORAL at 22:27

## 2017-01-24 RX ADMIN — Medication 1 TABLET: at 08:47

## 2017-01-25 VITALS
RESPIRATION RATE: 18 BRPM | DIASTOLIC BLOOD PRESSURE: 68 MMHG | WEIGHT: 170 LBS | OXYGEN SATURATION: 98 % | HEIGHT: 69 IN | TEMPERATURE: 98.5 F | SYSTOLIC BLOOD PRESSURE: 161 MMHG | BODY MASS INDEX: 25.18 KG/M2 | HEART RATE: 60 BPM

## 2017-01-25 PROCEDURE — 97110 THERAPEUTIC EXERCISES: CPT

## 2017-01-25 PROCEDURE — C9113 INJ PANTOPRAZOLE SODIUM, VIA: HCPCS | Performed by: PHYSICIAN ASSISTANT

## 2017-01-25 RX ADMIN — LOSARTAN POTASSIUM 100 MG: 50 TABLET, FILM COATED ORAL at 08:58

## 2017-01-25 RX ADMIN — PRAVASTATIN SODIUM 20 MG: 20 TABLET ORAL at 16:31

## 2017-01-25 RX ADMIN — METRONIDAZOLE 500 MG: 500 INJECTION, SOLUTION INTRAVENOUS at 08:58

## 2017-01-25 RX ADMIN — Medication 1 TABLET: at 08:58

## 2017-01-25 RX ADMIN — METOPROLOL TARTRATE 50 MG: 50 TABLET ORAL at 08:59

## 2017-01-25 RX ADMIN — CIPROFLOXACIN 400 MG: 2 INJECTION, SOLUTION INTRAVENOUS at 03:12

## 2017-01-25 RX ADMIN — OXYCODONE HYDROCHLORIDE AND ACETAMINOPHEN 500 MG: 500 TABLET ORAL at 08:58

## 2017-01-25 RX ADMIN — PANTOPRAZOLE SODIUM 40 MG: 40 INJECTION, POWDER, FOR SOLUTION INTRAVENOUS at 08:59

## 2017-01-25 RX ADMIN — GABAPENTIN 300 MG: 300 CAPSULE ORAL at 08:59

## 2017-01-30 ENCOUNTER — GENERIC CONVERSION - ENCOUNTER (OUTPATIENT)
Dept: OTHER | Facility: OTHER | Age: 82
End: 2017-01-30

## 2017-02-02 ENCOUNTER — GENERIC CONVERSION - ENCOUNTER (OUTPATIENT)
Dept: OTHER | Facility: OTHER | Age: 82
End: 2017-02-02

## 2017-02-14 ENCOUNTER — LAB REQUISITION (OUTPATIENT)
Dept: LAB | Facility: HOSPITAL | Age: 82
End: 2017-02-14
Payer: MEDICARE

## 2017-02-14 DIAGNOSIS — D62 ACUTE POSTHEMORRHAGIC ANEMIA: ICD-10-CM

## 2017-02-14 LAB
ERYTHROCYTE [DISTWIDTH] IN BLOOD BY AUTOMATED COUNT: 17 % (ref 11.6–15.1)
HCT VFR BLD AUTO: 33.9 % (ref 42–52)
HGB BLD-MCNC: 11 G/DL (ref 14–18)
IRON SATN MFR SERPL: 23 %
IRON SERPL-MCNC: 70 UG/DL (ref 65–175)
MCH RBC QN AUTO: 29.5 PG (ref 27–31)
MCHC RBC AUTO-ENTMCNC: 32.5 G/DL (ref 31.4–37.4)
MCV RBC AUTO: 91 FL (ref 82–98)
PLATELET # BLD AUTO: 310 THOUSANDS/UL (ref 130–400)
PMV BLD AUTO: 7.7 FL (ref 8.9–12.7)
RBC # BLD AUTO: 3.73 MILLION/UL (ref 4.7–6.1)
TIBC SERPL-MCNC: 306 UG/DL (ref 250–450)
WBC # BLD AUTO: 6.5 THOUSAND/UL (ref 4.8–10.8)

## 2017-02-14 PROCEDURE — 83550 IRON BINDING TEST: CPT | Performed by: FAMILY MEDICINE

## 2017-02-14 PROCEDURE — 83540 ASSAY OF IRON: CPT | Performed by: FAMILY MEDICINE

## 2017-02-14 PROCEDURE — 85027 COMPLETE CBC AUTOMATED: CPT | Performed by: FAMILY MEDICINE

## 2017-02-15 ENCOUNTER — ALLSCRIPTS OFFICE VISIT (OUTPATIENT)
Dept: OTHER | Facility: OTHER | Age: 82
End: 2017-02-15

## 2017-02-15 DIAGNOSIS — D50.0 IRON DEFICIENCY ANEMIA DUE TO CHRONIC BLOOD LOSS: ICD-10-CM

## 2017-03-08 ENCOUNTER — APPOINTMENT (EMERGENCY)
Dept: RADIOLOGY | Facility: HOSPITAL | Age: 82
End: 2017-03-08
Payer: MEDICARE

## 2017-03-08 ENCOUNTER — HOSPITAL ENCOUNTER (EMERGENCY)
Facility: HOSPITAL | Age: 82
Discharge: HOME | End: 2017-03-08
Admitting: EMERGENCY MEDICINE
Payer: MEDICARE

## 2017-03-08 VITALS
SYSTOLIC BLOOD PRESSURE: 144 MMHG | DIASTOLIC BLOOD PRESSURE: 68 MMHG | WEIGHT: 170 LBS | HEART RATE: 77 BPM | BODY MASS INDEX: 25.18 KG/M2 | RESPIRATION RATE: 18 BRPM | TEMPERATURE: 98 F | HEIGHT: 69 IN | OXYGEN SATURATION: 97 %

## 2017-03-08 DIAGNOSIS — S52.501A FRACTURE OF RIGHT DISTAL RADIUS: Primary | ICD-10-CM

## 2017-03-08 PROCEDURE — 73130 X-RAY EXAM OF HAND: CPT

## 2017-03-08 PROCEDURE — 73110 X-RAY EXAM OF WRIST: CPT

## 2017-03-08 PROCEDURE — 99283 EMERGENCY DEPT VISIT LOW MDM: CPT

## 2017-03-08 PROCEDURE — 73030 X-RAY EXAM OF SHOULDER: CPT

## 2017-03-08 PROCEDURE — 73080 X-RAY EXAM OF ELBOW: CPT

## 2017-03-09 ENCOUNTER — ALLSCRIPTS OFFICE VISIT (OUTPATIENT)
Dept: OTHER | Facility: OTHER | Age: 82
End: 2017-03-09

## 2017-03-15 ENCOUNTER — LAB REQUISITION (OUTPATIENT)
Dept: LAB | Facility: HOSPITAL | Age: 82
End: 2017-03-15
Payer: MEDICARE

## 2017-03-15 DIAGNOSIS — D50.0 IRON DEFICIENCY ANEMIA DUE TO CHRONIC BLOOD LOSS: ICD-10-CM

## 2017-03-15 LAB
BASOPHILS # BLD AUTO: 0.15 THOUSAND/UL (ref 0–0.1)
BASOPHILS NFR MAR MANUAL: 2 % (ref 0–1)
EOSINOPHIL # BLD AUTO: 0.77 THOUSAND/UL (ref 0–0.61)
EOSINOPHIL NFR BLD MANUAL: 10 % (ref 0–6)
ERYTHROCYTE [DISTWIDTH] IN BLOOD BY AUTOMATED COUNT: 14.5 % (ref 11.6–15.1)
HCT VFR BLD AUTO: 36.4 % (ref 42–52)
HGB BLD-MCNC: 12.1 G/DL (ref 14–18)
IRON SATN MFR SERPL: 21 %
IRON SERPL-MCNC: 66 UG/DL (ref 65–175)
LYMPHOCYTES # BLD AUTO: 1.23 THOUSAND/UL (ref 0.6–4.47)
LYMPHOCYTES # BLD AUTO: 16 %
MCH RBC QN AUTO: 29.9 PG (ref 27–31)
MCHC RBC AUTO-ENTMCNC: 33.3 G/DL (ref 31.4–37.4)
MCV RBC AUTO: 90 FL (ref 82–98)
MONOCYTES # BLD AUTO: 0.39 THOUSAND/UL (ref 0–1.22)
MONOCYTES NFR BLD AUTO: 5 % (ref 4–12)
NEUTS BAND NFR BLD MANUAL: 2 % (ref 0–8)
NEUTS SEG # BLD: 5.16 THOUSAND/UL (ref 1.81–6.82)
NEUTS SEG NFR BLD AUTO: 65 %
PLATELET # BLD AUTO: 300 THOUSANDS/UL (ref 130–400)
PLATELET BLD QL SMEAR: ADEQUATE
PMV BLD AUTO: 7.8 FL (ref 8.9–12.7)
RBC # BLD AUTO: 4.06 MILLION/UL (ref 4.7–6.1)
TIBC SERPL-MCNC: 313 UG/DL (ref 250–450)
TOTAL CELLS COUNTED SPEC: 100
WBC # BLD AUTO: 7.7 THOUSAND/UL (ref 4.8–10.8)

## 2017-03-15 PROCEDURE — 85007 BL SMEAR W/DIFF WBC COUNT: CPT | Performed by: FAMILY MEDICINE

## 2017-03-15 PROCEDURE — 83540 ASSAY OF IRON: CPT | Performed by: FAMILY MEDICINE

## 2017-03-15 PROCEDURE — 85027 COMPLETE CBC AUTOMATED: CPT | Performed by: FAMILY MEDICINE

## 2017-03-15 PROCEDURE — 83550 IRON BINDING TEST: CPT | Performed by: FAMILY MEDICINE

## 2017-03-16 ENCOUNTER — GENERIC CONVERSION - ENCOUNTER (OUTPATIENT)
Dept: OTHER | Facility: OTHER | Age: 82
End: 2017-03-16

## 2017-03-22 ENCOUNTER — HOSPITAL ENCOUNTER (OUTPATIENT)
Dept: RADIOLOGY | Facility: CLINIC | Age: 82
Discharge: HOME | End: 2017-03-22
Payer: MEDICARE

## 2017-03-22 ENCOUNTER — ALLSCRIPTS OFFICE VISIT (OUTPATIENT)
Dept: OTHER | Facility: OTHER | Age: 82
End: 2017-03-22

## 2017-03-22 DIAGNOSIS — S52.501A CLOSED FRACTURE OF LOWER END OF RIGHT RADIUS: ICD-10-CM

## 2017-03-22 PROCEDURE — 73100 X-RAY EXAM OF WRIST: CPT

## 2017-04-05 ENCOUNTER — HOSPITAL ENCOUNTER (OUTPATIENT)
Dept: RADIOLOGY | Facility: CLINIC | Age: 82
Discharge: HOME | End: 2017-04-05
Payer: MEDICARE

## 2017-04-05 ENCOUNTER — ALLSCRIPTS OFFICE VISIT (OUTPATIENT)
Dept: OTHER | Facility: OTHER | Age: 82
End: 2017-04-05

## 2017-04-05 DIAGNOSIS — S52.501A CLOSED FRACTURE OF LOWER END OF RIGHT RADIUS: ICD-10-CM

## 2017-04-05 PROCEDURE — 73100 X-RAY EXAM OF WRIST: CPT

## 2017-04-12 ENCOUNTER — ALLSCRIPTS OFFICE VISIT (OUTPATIENT)
Dept: OTHER | Facility: OTHER | Age: 82
End: 2017-04-12

## 2017-04-17 ENCOUNTER — APPOINTMENT (OUTPATIENT)
Dept: OCCUPATIONAL THERAPY | Facility: CLINIC | Age: 82
End: 2017-04-17
Payer: MEDICARE

## 2017-04-17 PROCEDURE — G8984 CARRY CURRENT STATUS: HCPCS

## 2017-04-17 PROCEDURE — G8985 CARRY GOAL STATUS: HCPCS

## 2017-04-17 PROCEDURE — 97165 OT EVAL LOW COMPLEX 30 MIN: CPT

## 2017-04-19 ENCOUNTER — APPOINTMENT (OUTPATIENT)
Dept: OCCUPATIONAL THERAPY | Facility: CLINIC | Age: 82
End: 2017-04-19
Payer: MEDICARE

## 2017-04-19 PROCEDURE — 97140 MANUAL THERAPY 1/> REGIONS: CPT

## 2017-04-19 PROCEDURE — 97110 THERAPEUTIC EXERCISES: CPT

## 2017-04-24 ENCOUNTER — APPOINTMENT (OUTPATIENT)
Dept: OCCUPATIONAL THERAPY | Facility: CLINIC | Age: 82
End: 2017-04-24
Payer: MEDICARE

## 2017-04-24 PROCEDURE — 97140 MANUAL THERAPY 1/> REGIONS: CPT

## 2017-04-24 PROCEDURE — 97110 THERAPEUTIC EXERCISES: CPT

## 2017-04-27 ENCOUNTER — APPOINTMENT (OUTPATIENT)
Dept: OCCUPATIONAL THERAPY | Facility: CLINIC | Age: 82
End: 2017-04-27
Payer: MEDICARE

## 2017-04-27 PROCEDURE — 97140 MANUAL THERAPY 1/> REGIONS: CPT

## 2017-04-27 PROCEDURE — 97110 THERAPEUTIC EXERCISES: CPT

## 2017-05-01 ENCOUNTER — APPOINTMENT (OUTPATIENT)
Dept: OCCUPATIONAL THERAPY | Facility: CLINIC | Age: 82
End: 2017-05-01
Payer: MEDICARE

## 2017-05-01 PROCEDURE — 97140 MANUAL THERAPY 1/> REGIONS: CPT

## 2017-05-01 PROCEDURE — 97110 THERAPEUTIC EXERCISES: CPT

## 2017-05-04 ENCOUNTER — APPOINTMENT (OUTPATIENT)
Dept: OCCUPATIONAL THERAPY | Facility: CLINIC | Age: 82
End: 2017-05-04
Payer: MEDICARE

## 2017-05-04 PROCEDURE — 97140 MANUAL THERAPY 1/> REGIONS: CPT

## 2017-05-04 PROCEDURE — 97110 THERAPEUTIC EXERCISES: CPT

## 2017-05-08 ENCOUNTER — APPOINTMENT (OUTPATIENT)
Dept: OCCUPATIONAL THERAPY | Facility: CLINIC | Age: 82
End: 2017-05-08
Payer: MEDICARE

## 2017-05-08 PROCEDURE — 97140 MANUAL THERAPY 1/> REGIONS: CPT

## 2017-05-08 PROCEDURE — 97110 THERAPEUTIC EXERCISES: CPT

## 2017-05-15 ENCOUNTER — APPOINTMENT (OUTPATIENT)
Dept: OCCUPATIONAL THERAPY | Facility: CLINIC | Age: 82
End: 2017-05-15
Payer: MEDICARE

## 2017-05-15 ENCOUNTER — ALLSCRIPTS OFFICE VISIT (OUTPATIENT)
Dept: OTHER | Facility: OTHER | Age: 82
End: 2017-05-15

## 2017-05-15 PROCEDURE — 97110 THERAPEUTIC EXERCISES: CPT

## 2017-05-15 PROCEDURE — 97140 MANUAL THERAPY 1/> REGIONS: CPT

## 2017-05-17 ENCOUNTER — APPOINTMENT (OUTPATIENT)
Dept: OCCUPATIONAL THERAPY | Facility: CLINIC | Age: 82
End: 2017-05-17
Payer: MEDICARE

## 2017-05-17 PROCEDURE — 97140 MANUAL THERAPY 1/> REGIONS: CPT

## 2017-05-17 PROCEDURE — G8985 CARRY GOAL STATUS: HCPCS

## 2017-05-17 PROCEDURE — G8984 CARRY CURRENT STATUS: HCPCS

## 2017-05-17 PROCEDURE — 97110 THERAPEUTIC EXERCISES: CPT

## 2017-05-18 ENCOUNTER — TRANSCRIBE ORDERS (OUTPATIENT)
Dept: LAB | Facility: CLINIC | Age: 82
End: 2017-05-18

## 2017-05-18 ENCOUNTER — APPOINTMENT (OUTPATIENT)
Dept: LAB | Facility: CLINIC | Age: 82
End: 2017-05-18
Payer: MEDICARE

## 2017-05-18 DIAGNOSIS — M12.9 ARTHROPATHY, UNSPECIFIED, SITE UNSPECIFIED: ICD-10-CM

## 2017-05-18 DIAGNOSIS — D50.0 IRON DEFICIENCY ANEMIA SECONDARY TO BLOOD LOSS (CHRONIC): ICD-10-CM

## 2017-05-18 DIAGNOSIS — G60.0 PERONEAL MUSCULAR ATROPHY: ICD-10-CM

## 2017-05-18 DIAGNOSIS — I10 ESSENTIAL HYPERTENSION, MALIGNANT: ICD-10-CM

## 2017-05-18 DIAGNOSIS — E78.5 OTHER AND UNSPECIFIED HYPERLIPIDEMIA: Primary | ICD-10-CM

## 2017-05-18 DIAGNOSIS — I25.10 UNSPECIFIED CARDIOVASCULAR DISEASE: ICD-10-CM

## 2017-05-18 LAB
ALBUMIN SERPL BCP-MCNC: 3.3 G/DL (ref 3.5–5)
ALP SERPL-CCNC: 44 U/L (ref 46–116)
ALT SERPL W P-5'-P-CCNC: 23 U/L (ref 12–78)
ANION GAP SERPL CALCULATED.3IONS-SCNC: 7 MMOL/L (ref 4–13)
AST SERPL W P-5'-P-CCNC: 12 U/L (ref 5–45)
BASOPHILS # BLD AUTO: 0.4 THOUSANDS/ΜL (ref 0–0.1)
BASOPHILS NFR BLD AUTO: 6 % (ref 0–1)
BILIRUB SERPL-MCNC: 0.53 MG/DL (ref 0.2–1)
BUN SERPL-MCNC: 24 MG/DL (ref 5–25)
CALCIUM SERPL-MCNC: 8.9 MG/DL (ref 8.3–10.1)
CHLORIDE SERPL-SCNC: 113 MMOL/L (ref 100–108)
CHOLEST SERPL-MCNC: 115 MG/DL (ref 50–200)
CO2 SERPL-SCNC: 24 MMOL/L (ref 21–32)
CREAT SERPL-MCNC: 1.23 MG/DL (ref 0.6–1.3)
EOSINOPHIL # BLD AUTO: 0.63 THOUSAND/ΜL (ref 0–0.61)
EOSINOPHIL NFR BLD AUTO: 10 % (ref 0–6)
ERYTHROCYTE [DISTWIDTH] IN BLOOD BY AUTOMATED COUNT: 16.3 % (ref 11.6–15.1)
GFR SERPL CREATININE-BSD FRML MDRD: 55.4 ML/MIN/1.73SQ M
GLUCOSE P FAST SERPL-MCNC: 96 MG/DL (ref 65–99)
HCT VFR BLD AUTO: 36.8 % (ref 36.5–49.3)
HDLC SERPL-MCNC: 40 MG/DL (ref 40–60)
HGB BLD-MCNC: 11.8 G/DL (ref 12–17)
IRON SERPL-MCNC: 63 UG/DL (ref 65–175)
LDLC SERPL CALC-MCNC: 62 MG/DL (ref 0–100)
LYMPHOCYTES # BLD AUTO: 1.43 THOUSANDS/ΜL (ref 0.6–4.47)
LYMPHOCYTES NFR BLD AUTO: 22 % (ref 14–44)
MCH RBC QN AUTO: 29.2 PG (ref 26.8–34.3)
MCHC RBC AUTO-ENTMCNC: 32.1 G/DL (ref 31.4–37.4)
MCV RBC AUTO: 91 FL (ref 82–98)
MONOCYTES # BLD AUTO: 0.55 THOUSAND/ΜL (ref 0.17–1.22)
MONOCYTES NFR BLD AUTO: 9 % (ref 4–12)
NEUTROPHILS # BLD AUTO: 3.37 THOUSANDS/ΜL (ref 1.85–7.62)
NEUTS SEG NFR BLD AUTO: 53 % (ref 43–75)
NRBC BLD AUTO-RTO: 0 /100 WBCS
PLATELET # BLD AUTO: 275 THOUSANDS/UL (ref 149–390)
PMV BLD AUTO: 10.8 FL (ref 8.9–12.7)
POTASSIUM SERPL-SCNC: 3.7 MMOL/L (ref 3.5–5.3)
PROT SERPL-MCNC: 6.9 G/DL (ref 6.4–8.2)
RBC # BLD AUTO: 4.04 MILLION/UL (ref 3.88–5.62)
SODIUM SERPL-SCNC: 144 MMOL/L (ref 136–145)
TIBC SERPL-MCNC: 302 UG/DL (ref 250–450)
TRIGL SERPL-MCNC: 63 MG/DL
WBC # BLD AUTO: 6.4 THOUSAND/UL (ref 4.31–10.16)

## 2017-05-18 PROCEDURE — 80061 LIPID PANEL: CPT

## 2017-05-18 PROCEDURE — 36415 COLL VENOUS BLD VENIPUNCTURE: CPT

## 2017-05-18 PROCEDURE — 83550 IRON BINDING TEST: CPT

## 2017-05-18 PROCEDURE — 80053 COMPREHEN METABOLIC PANEL: CPT

## 2017-05-18 PROCEDURE — 83540 ASSAY OF IRON: CPT

## 2017-05-18 PROCEDURE — 85025 COMPLETE CBC W/AUTO DIFF WBC: CPT

## 2017-05-20 ENCOUNTER — GENERIC CONVERSION - ENCOUNTER (OUTPATIENT)
Dept: OTHER | Facility: OTHER | Age: 82
End: 2017-05-20

## 2017-05-23 ENCOUNTER — APPOINTMENT (OUTPATIENT)
Dept: OCCUPATIONAL THERAPY | Facility: CLINIC | Age: 82
End: 2017-05-23
Payer: MEDICARE

## 2017-05-23 PROCEDURE — 97140 MANUAL THERAPY 1/> REGIONS: CPT

## 2017-05-23 PROCEDURE — 97110 THERAPEUTIC EXERCISES: CPT

## 2017-06-05 ENCOUNTER — APPOINTMENT (OUTPATIENT)
Dept: OCCUPATIONAL THERAPY | Facility: CLINIC | Age: 82
End: 2017-06-05
Payer: MEDICARE

## 2017-06-05 PROCEDURE — 97140 MANUAL THERAPY 1/> REGIONS: CPT

## 2017-06-05 PROCEDURE — 97110 THERAPEUTIC EXERCISES: CPT

## 2017-06-08 ENCOUNTER — APPOINTMENT (OUTPATIENT)
Dept: OCCUPATIONAL THERAPY | Facility: CLINIC | Age: 82
End: 2017-06-08
Payer: MEDICARE

## 2017-06-08 PROCEDURE — 97140 MANUAL THERAPY 1/> REGIONS: CPT

## 2017-06-08 PROCEDURE — 97110 THERAPEUTIC EXERCISES: CPT

## 2017-06-13 ENCOUNTER — APPOINTMENT (OUTPATIENT)
Dept: OCCUPATIONAL THERAPY | Facility: CLINIC | Age: 82
End: 2017-06-13
Payer: MEDICARE

## 2017-06-13 PROCEDURE — 97140 MANUAL THERAPY 1/> REGIONS: CPT

## 2017-06-13 PROCEDURE — 97110 THERAPEUTIC EXERCISES: CPT

## 2017-06-15 ENCOUNTER — APPOINTMENT (OUTPATIENT)
Dept: OCCUPATIONAL THERAPY | Facility: CLINIC | Age: 82
End: 2017-06-15
Payer: MEDICARE

## 2017-06-15 PROCEDURE — 97140 MANUAL THERAPY 1/> REGIONS: CPT

## 2017-06-15 PROCEDURE — 97110 THERAPEUTIC EXERCISES: CPT

## 2017-06-20 ENCOUNTER — APPOINTMENT (OUTPATIENT)
Dept: OCCUPATIONAL THERAPY | Facility: CLINIC | Age: 82
End: 2017-06-20
Payer: MEDICARE

## 2017-06-20 PROCEDURE — 97140 MANUAL THERAPY 1/> REGIONS: CPT

## 2017-06-20 PROCEDURE — 97110 THERAPEUTIC EXERCISES: CPT

## 2017-06-20 PROCEDURE — G8985 CARRY GOAL STATUS: HCPCS

## 2017-06-20 PROCEDURE — G8984 CARRY CURRENT STATUS: HCPCS

## 2017-06-22 ENCOUNTER — APPOINTMENT (OUTPATIENT)
Dept: OCCUPATIONAL THERAPY | Facility: CLINIC | Age: 82
End: 2017-06-22
Payer: MEDICARE

## 2017-06-23 ENCOUNTER — GENERIC CONVERSION - ENCOUNTER (OUTPATIENT)
Dept: OTHER | Facility: OTHER | Age: 82
End: 2017-06-23

## 2017-06-24 ENCOUNTER — APPOINTMENT (EMERGENCY)
Dept: RADIOLOGY | Facility: HOSPITAL | Age: 82
DRG: 378 | End: 2017-06-24
Payer: MEDICARE

## 2017-06-24 ENCOUNTER — GENERIC CONVERSION - ENCOUNTER (OUTPATIENT)
Dept: OTHER | Facility: OTHER | Age: 82
End: 2017-06-24

## 2017-06-24 ENCOUNTER — HOSPITAL ENCOUNTER (INPATIENT)
Facility: HOSPITAL | Age: 82
LOS: 3 days | Discharge: HOME HEALTH | DRG: 378 | End: 2017-06-27
Attending: EMERGENCY MEDICINE | Admitting: ANESTHESIOLOGY
Payer: MEDICARE

## 2017-06-24 DIAGNOSIS — I10 HYPERTENSION: ICD-10-CM

## 2017-06-24 DIAGNOSIS — K92.2 GI BLEED: Primary | ICD-10-CM

## 2017-06-24 DIAGNOSIS — K92.2 LOWER GI BLEED: ICD-10-CM

## 2017-06-24 DIAGNOSIS — N17.9 AKI (ACUTE KIDNEY INJURY) (HCC): ICD-10-CM

## 2017-06-24 DIAGNOSIS — R79.89 ABNORMAL LFTS: ICD-10-CM

## 2017-06-24 PROBLEM — G60.9 IDIOPATHIC PERIPHERAL NEUROPATHY: Chronic | Status: ACTIVE | Noted: 2017-06-24

## 2017-06-24 LAB
ABO GROUP BLD: NORMAL
ALBUMIN SERPL BCP-MCNC: 2.7 G/DL (ref 3.5–5)
ALP SERPL-CCNC: 53 U/L (ref 46–116)
ALT SERPL W P-5'-P-CCNC: 60 U/L (ref 12–78)
ANION GAP SERPL CALCULATED.3IONS-SCNC: 12 MMOL/L (ref 4–13)
APTT PPP: 26 SECONDS (ref 23–35)
AST SERPL W P-5'-P-CCNC: 55 U/L (ref 5–45)
BASOPHILS # BLD AUTO: 0 THOUSANDS/ΜL (ref 0–0.1)
BASOPHILS NFR BLD AUTO: 0 % (ref 0–1)
BILIRUB SERPL-MCNC: 1 MG/DL (ref 0.2–1)
BLD GP AB SCN SERPL QL: NEGATIVE
BUN SERPL-MCNC: 52 MG/DL (ref 5–25)
CALCIUM SERPL-MCNC: 8.7 MG/DL (ref 8.3–10.1)
CHLORIDE SERPL-SCNC: 102 MMOL/L (ref 100–108)
CO2 SERPL-SCNC: 22 MMOL/L (ref 21–32)
CREAT SERPL-MCNC: 2.24 MG/DL (ref 0.6–1.3)
EOSINOPHIL # BLD AUTO: 0.2 THOUSAND/ΜL (ref 0–0.61)
EOSINOPHIL NFR BLD AUTO: 2 % (ref 0–6)
ERYTHROCYTE [DISTWIDTH] IN BLOOD BY AUTOMATED COUNT: 15.4 % (ref 11.6–15.1)
GFR SERPL CREATININE-BSD FRML MDRD: 27.7 ML/MIN/1.73SQ M
GLUCOSE SERPL-MCNC: 124 MG/DL (ref 65–140)
HCT VFR BLD AUTO: 28.2 % (ref 42–52)
HGB BLD-MCNC: 9.4 G/DL (ref 14–18)
INR PPP: 1.1 (ref 0.86–1.16)
LYMPHOCYTES # BLD AUTO: 0.5 THOUSANDS/ΜL (ref 0.6–4.47)
LYMPHOCYTES NFR BLD AUTO: 6 % (ref 14–44)
MAGNESIUM SERPL-MCNC: 2 MG/DL (ref 1.6–2.6)
MCH RBC QN AUTO: 29.4 PG (ref 27–31)
MCHC RBC AUTO-ENTMCNC: 33.4 G/DL (ref 31.4–37.4)
MCV RBC AUTO: 88 FL (ref 82–98)
MONOCYTES # BLD AUTO: 1.3 THOUSAND/ΜL (ref 0.17–1.22)
MONOCYTES NFR BLD AUTO: 14 % (ref 4–12)
NEUTROPHILS # BLD AUTO: 7.2 THOUSANDS/ΜL (ref 1.85–7.62)
NEUTS SEG NFR BLD AUTO: 78 % (ref 43–75)
NRBC BLD AUTO-RTO: 0 /100 WBCS
PLATELET # BLD AUTO: 306 THOUSANDS/UL (ref 130–400)
PMV BLD AUTO: 8.9 FL (ref 8.9–12.7)
POTASSIUM SERPL-SCNC: 4.1 MMOL/L (ref 3.5–5.3)
PROT SERPL-MCNC: 7 G/DL (ref 6.4–8.2)
PROTHROMBIN TIME: 11.6 SECONDS (ref 9.4–11.7)
RBC # BLD AUTO: 3.2 MILLION/UL (ref 4.7–6.1)
RH BLD: POSITIVE
SODIUM SERPL-SCNC: 136 MMOL/L (ref 136–145)
SPECIMEN EXPIRATION DATE: NORMAL
TROPONIN I SERPL-MCNC: 0.03 NG/ML
WBC # BLD AUTO: 9.2 THOUSAND/UL (ref 4.8–10.8)

## 2017-06-24 PROCEDURE — 86901 BLOOD TYPING SEROLOGIC RH(D): CPT | Performed by: EMERGENCY MEDICINE

## 2017-06-24 PROCEDURE — 80053 COMPREHEN METABOLIC PANEL: CPT | Performed by: EMERGENCY MEDICINE

## 2017-06-24 PROCEDURE — 87081 CULTURE SCREEN ONLY: CPT | Performed by: ANESTHESIOLOGY

## 2017-06-24 PROCEDURE — 86850 RBC ANTIBODY SCREEN: CPT | Performed by: EMERGENCY MEDICINE

## 2017-06-24 PROCEDURE — 85730 THROMBOPLASTIN TIME PARTIAL: CPT | Performed by: EMERGENCY MEDICINE

## 2017-06-24 PROCEDURE — 93005 ELECTROCARDIOGRAM TRACING: CPT | Performed by: EMERGENCY MEDICINE

## 2017-06-24 PROCEDURE — 83735 ASSAY OF MAGNESIUM: CPT | Performed by: EMERGENCY MEDICINE

## 2017-06-24 PROCEDURE — 84484 ASSAY OF TROPONIN QUANT: CPT | Performed by: EMERGENCY MEDICINE

## 2017-06-24 PROCEDURE — 85014 HEMATOCRIT: CPT | Performed by: FAMILY MEDICINE

## 2017-06-24 PROCEDURE — P9021 RED BLOOD CELLS UNIT: HCPCS

## 2017-06-24 PROCEDURE — 36415 COLL VENOUS BLD VENIPUNCTURE: CPT | Performed by: EMERGENCY MEDICINE

## 2017-06-24 PROCEDURE — 93005 ELECTROCARDIOGRAM TRACING: CPT | Performed by: FAMILY MEDICINE

## 2017-06-24 PROCEDURE — 86900 BLOOD TYPING SEROLOGIC ABO: CPT | Performed by: FAMILY MEDICINE

## 2017-06-24 PROCEDURE — 30233N1 TRANSFUSION OF NONAUTOLOGOUS RED BLOOD CELLS INTO PERIPHERAL VEIN, PERCUTANEOUS APPROACH: ICD-10-PCS | Performed by: FAMILY MEDICINE

## 2017-06-24 PROCEDURE — C9113 INJ PANTOPRAZOLE SODIUM, VIA: HCPCS | Performed by: FAMILY MEDICINE

## 2017-06-24 PROCEDURE — 86900 BLOOD TYPING SEROLOGIC ABO: CPT | Performed by: EMERGENCY MEDICINE

## 2017-06-24 PROCEDURE — 86920 COMPATIBILITY TEST SPIN: CPT

## 2017-06-24 PROCEDURE — 85018 HEMOGLOBIN: CPT | Performed by: FAMILY MEDICINE

## 2017-06-24 PROCEDURE — 85610 PROTHROMBIN TIME: CPT | Performed by: EMERGENCY MEDICINE

## 2017-06-24 PROCEDURE — 87147 CULTURE TYPE IMMUNOLOGIC: CPT | Performed by: ANESTHESIOLOGY

## 2017-06-24 PROCEDURE — 74176 CT ABD & PELVIS W/O CONTRAST: CPT

## 2017-06-24 PROCEDURE — 86901 BLOOD TYPING SEROLOGIC RH(D): CPT | Performed by: FAMILY MEDICINE

## 2017-06-24 PROCEDURE — 86880 COOMBS TEST DIRECT: CPT | Performed by: FAMILY MEDICINE

## 2017-06-24 PROCEDURE — 99285 EMERGENCY DEPT VISIT HI MDM: CPT

## 2017-06-24 PROCEDURE — 85025 COMPLETE CBC W/AUTO DIFF WBC: CPT | Performed by: EMERGENCY MEDICINE

## 2017-06-24 RX ORDER — MAGNESIUM SULFATE HEPTAHYDRATE 40 MG/ML
2 INJECTION, SOLUTION INTRAVENOUS ONCE
Status: COMPLETED | OUTPATIENT
Start: 2017-06-24 | End: 2017-06-25

## 2017-06-24 RX ORDER — ACETAMINOPHEN 325 MG/1
650 TABLET ORAL EVERY 6 HOURS PRN
Status: DISCONTINUED | OUTPATIENT
Start: 2017-06-24 | End: 2017-06-27 | Stop reason: HOSPADM

## 2017-06-24 RX ORDER — PREDNISONE 1 MG/1
2.5 TABLET ORAL DAILY
Status: DISCONTINUED | OUTPATIENT
Start: 2017-06-25 | End: 2017-06-24

## 2017-06-24 RX ORDER — GABAPENTIN 300 MG/1
300 CAPSULE ORAL 2 TIMES DAILY
Status: DISCONTINUED | OUTPATIENT
Start: 2017-06-24 | End: 2017-06-27 | Stop reason: HOSPADM

## 2017-06-24 RX ORDER — SODIUM CHLORIDE, SODIUM LACTATE, POTASSIUM CHLORIDE, CALCIUM CHLORIDE 600; 310; 30; 20 MG/100ML; MG/100ML; MG/100ML; MG/100ML
50 INJECTION, SOLUTION INTRAVENOUS CONTINUOUS
Status: DISCONTINUED | OUTPATIENT
Start: 2017-06-24 | End: 2017-06-26

## 2017-06-24 RX ORDER — SACCHAROMYCES BOULARDII 250 MG
250 CAPSULE ORAL EVERY EVENING
COMMUNITY
End: 2018-02-19

## 2017-06-24 RX ORDER — DIPHENHYDRAMINE HYDROCHLORIDE 50 MG/ML
25 INJECTION INTRAMUSCULAR; INTRAVENOUS ONCE
Status: COMPLETED | OUTPATIENT
Start: 2017-06-24 | End: 2017-06-24

## 2017-06-24 RX ORDER — PREDNISONE 2.5 MG
2.5 TABLET ORAL DAILY
COMMUNITY
End: 2018-02-19

## 2017-06-24 RX ORDER — PANTOPRAZOLE SODIUM 40 MG/1
40 INJECTION, POWDER, FOR SOLUTION INTRAVENOUS EVERY 12 HOURS SCHEDULED
Status: DISCONTINUED | OUTPATIENT
Start: 2017-06-24 | End: 2017-06-27 | Stop reason: HOSPADM

## 2017-06-24 RX ORDER — SODIUM CHLORIDE, SODIUM LACTATE, POTASSIUM CHLORIDE, CALCIUM CHLORIDE 600; 310; 30; 20 MG/100ML; MG/100ML; MG/100ML; MG/100ML
100 INJECTION, SOLUTION INTRAVENOUS CONTINUOUS
Status: DISCONTINUED | OUTPATIENT
Start: 2017-06-24 | End: 2017-06-24

## 2017-06-24 RX ADMIN — ACETAMINOPHEN 650 MG: 325 TABLET, FILM COATED ORAL at 22:35

## 2017-06-24 RX ADMIN — GABAPENTIN 300 MG: 300 CAPSULE ORAL at 22:36

## 2017-06-24 RX ADMIN — MAGNESIUM SULFATE HEPTAHYDRATE 2 G: 40 INJECTION, SOLUTION INTRAVENOUS at 23:58

## 2017-06-24 RX ADMIN — SODIUM CHLORIDE, SODIUM LACTATE, POTASSIUM CHLORIDE, AND CALCIUM CHLORIDE 100 ML/HR: .6; .31; .03; .02 INJECTION, SOLUTION INTRAVENOUS at 22:36

## 2017-06-24 RX ADMIN — SODIUM CHLORIDE 500 ML: 0.9 INJECTION, SOLUTION INTRAVENOUS at 20:37

## 2017-06-24 RX ADMIN — DIPHENHYDRAMINE HYDROCHLORIDE 25 MG: 50 INJECTION, SOLUTION INTRAMUSCULAR; INTRAVENOUS at 22:36

## 2017-06-24 RX ADMIN — PANTOPRAZOLE SODIUM 40 MG: 40 INJECTION, POWDER, FOR SOLUTION INTRAVENOUS at 22:36

## 2017-06-25 LAB
ABO GROUP BLD BPU: NORMAL
ANION GAP SERPL CALCULATED.3IONS-SCNC: 12 MMOL/L (ref 4–13)
BASOPHILS # BLD AUTO: 0.2 THOUSANDS/ΜL (ref 0–0.1)
BASOPHILS NFR BLD AUTO: 2 % (ref 0–1)
BPU ID: NORMAL
BUN SERPL-MCNC: 42 MG/DL (ref 5–25)
CALCIUM SERPL-MCNC: 8.7 MG/DL (ref 8.3–10.1)
CHLORIDE SERPL-SCNC: 107 MMOL/L (ref 100–108)
CO2 SERPL-SCNC: 19 MMOL/L (ref 21–32)
CREAT SERPL-MCNC: 1.7 MG/DL (ref 0.6–1.3)
CROSSMATCH: NORMAL
EOSINOPHIL # BLD AUTO: 0.3 THOUSAND/ΜL (ref 0–0.61)
EOSINOPHIL NFR BLD AUTO: 4 % (ref 0–6)
ERYTHROCYTE [DISTWIDTH] IN BLOOD BY AUTOMATED COUNT: 15.3 % (ref 11.6–15.1)
GFR SERPL CREATININE-BSD FRML MDRD: 38.1 ML/MIN/1.73SQ M
GLUCOSE SERPL-MCNC: 92 MG/DL (ref 65–140)
HCT VFR BLD AUTO: 27.9 % (ref 42–52)
HCT VFR BLD AUTO: 30.7 % (ref 42–52)
HGB BLD-MCNC: 10.4 G/DL (ref 14–18)
HGB BLD-MCNC: 9.4 G/DL (ref 14–18)
LYMPHOCYTES # BLD AUTO: 0.9 THOUSANDS/ΜL (ref 0.6–4.47)
LYMPHOCYTES NFR BLD AUTO: 12 % (ref 14–44)
MAGNESIUM SERPL-MCNC: 2.8 MG/DL (ref 1.6–2.6)
MCH RBC QN AUTO: 29.6 PG (ref 27–31)
MCHC RBC AUTO-ENTMCNC: 33.7 G/DL (ref 31.4–37.4)
MCV RBC AUTO: 88 FL (ref 82–98)
MONOCYTES # BLD AUTO: 1.1 THOUSAND/ΜL (ref 0.17–1.22)
MONOCYTES NFR BLD AUTO: 14 % (ref 4–12)
NEUTROPHILS # BLD AUTO: 5.3 THOUSANDS/ΜL (ref 1.85–7.62)
NEUTS SEG NFR BLD AUTO: 68 % (ref 43–75)
NRBC BLD AUTO-RTO: 0 /100 WBCS
PHOSPHATE SERPL-MCNC: 3.7 MG/DL (ref 2.3–4.1)
PLATELET # BLD AUTO: 266 THOUSANDS/UL (ref 130–400)
PMV BLD AUTO: 8.6 FL (ref 8.9–12.7)
POTASSIUM SERPL-SCNC: 4 MMOL/L (ref 3.5–5.3)
RBC # BLD AUTO: 3.5 MILLION/UL (ref 4.7–6.1)
SODIUM SERPL-SCNC: 138 MMOL/L (ref 136–145)
TRANSFUSION STATUS PATIENT QL: NORMAL
UNIT DISPENSE STATUS: NORMAL
UNIT PRODUCT CODE: NORMAL
UNIT RH: NORMAL
WBC # BLD AUTO: 7.8 THOUSAND/UL (ref 4.8–10.8)

## 2017-06-25 PROCEDURE — 80048 BASIC METABOLIC PNL TOTAL CA: CPT | Performed by: FAMILY MEDICINE

## 2017-06-25 PROCEDURE — 84100 ASSAY OF PHOSPHORUS: CPT | Performed by: FAMILY MEDICINE

## 2017-06-25 PROCEDURE — 83735 ASSAY OF MAGNESIUM: CPT | Performed by: FAMILY MEDICINE

## 2017-06-25 PROCEDURE — 85025 COMPLETE CBC W/AUTO DIFF WBC: CPT | Performed by: FAMILY MEDICINE

## 2017-06-25 PROCEDURE — 93005 ELECTROCARDIOGRAM TRACING: CPT

## 2017-06-25 PROCEDURE — C9113 INJ PANTOPRAZOLE SODIUM, VIA: HCPCS | Performed by: FAMILY MEDICINE

## 2017-06-25 RX ORDER — METOPROLOL TARTRATE 50 MG/1
50 TABLET, FILM COATED ORAL EVERY 12 HOURS SCHEDULED
Status: DISCONTINUED | OUTPATIENT
Start: 2017-06-25 | End: 2017-06-27 | Stop reason: HOSPADM

## 2017-06-25 RX ORDER — PRAVASTATIN SODIUM 20 MG
20 TABLET ORAL
Status: DISCONTINUED | OUTPATIENT
Start: 2017-06-25 | End: 2017-06-27 | Stop reason: HOSPADM

## 2017-06-25 RX ORDER — LOSARTAN POTASSIUM 50 MG/1
50 TABLET ORAL DAILY
Status: DISCONTINUED | OUTPATIENT
Start: 2017-06-25 | End: 2017-06-27 | Stop reason: HOSPADM

## 2017-06-25 RX ORDER — PREDNISONE 1 MG/1
2.5 TABLET ORAL DAILY
Status: DISCONTINUED | OUTPATIENT
Start: 2017-06-25 | End: 2017-06-27 | Stop reason: HOSPADM

## 2017-06-25 RX ADMIN — SODIUM CHLORIDE, SODIUM LACTATE, POTASSIUM CHLORIDE, AND CALCIUM CHLORIDE 50 ML/HR: .6; .31; .03; .02 INJECTION, SOLUTION INTRAVENOUS at 09:53

## 2017-06-25 RX ADMIN — PREDNISONE 2.5 MG: 1 TABLET ORAL at 11:04

## 2017-06-25 RX ADMIN — LOSARTAN POTASSIUM 50 MG: 50 TABLET, FILM COATED ORAL at 08:35

## 2017-06-25 RX ADMIN — GABAPENTIN 300 MG: 300 CAPSULE ORAL at 17:32

## 2017-06-25 RX ADMIN — METOPROLOL TARTRATE 50 MG: 50 TABLET ORAL at 09:58

## 2017-06-25 RX ADMIN — PANTOPRAZOLE SODIUM 40 MG: 40 INJECTION, POWDER, FOR SOLUTION INTRAVENOUS at 20:49

## 2017-06-25 RX ADMIN — PANTOPRAZOLE SODIUM 40 MG: 40 INJECTION, POWDER, FOR SOLUTION INTRAVENOUS at 08:34

## 2017-06-25 RX ADMIN — METOPROLOL TARTRATE 50 MG: 50 TABLET ORAL at 20:49

## 2017-06-25 RX ADMIN — GABAPENTIN 300 MG: 300 CAPSULE ORAL at 08:33

## 2017-06-25 RX ADMIN — PRAVASTATIN SODIUM 20 MG: 20 TABLET ORAL at 17:32

## 2017-06-25 RX ADMIN — POLYETHYLENE GLYCOL 3350, SODIUM SULFATE ANHYDROUS, SODIUM BICARBONATE, SODIUM CHLORIDE, POTASSIUM CHLORIDE 4000 ML: 236; 22.74; 6.74; 5.86; 2.97 POWDER, FOR SOLUTION ORAL at 18:57

## 2017-06-26 ENCOUNTER — ANESTHESIA EVENT (INPATIENT)
Dept: GASTROENTEROLOGY | Facility: AMBULARY SURGERY CENTER | Age: 82
DRG: 378 | End: 2017-06-26
Payer: MEDICARE

## 2017-06-26 ENCOUNTER — APPOINTMENT (INPATIENT)
Dept: PHYSICAL THERAPY | Facility: HOSPITAL | Age: 82
DRG: 378 | End: 2017-06-26
Payer: MEDICARE

## 2017-06-26 ENCOUNTER — APPOINTMENT (OUTPATIENT)
Dept: OCCUPATIONAL THERAPY | Facility: CLINIC | Age: 82
End: 2017-06-26
Payer: MEDICARE

## 2017-06-26 LAB
ALBUMIN SERPL BCP-MCNC: 2.2 G/DL (ref 3.5–5)
ALP SERPL-CCNC: 81 U/L (ref 46–116)
ALT SERPL W P-5'-P-CCNC: 90 U/L (ref 12–78)
ANION GAP SERPL CALCULATED.3IONS-SCNC: 11 MMOL/L (ref 4–13)
AST SERPL W P-5'-P-CCNC: 75 U/L (ref 5–45)
BASOPHILS # BLD AUTO: 0.2 THOUSANDS/ΜL (ref 0–0.1)
BASOPHILS NFR BLD AUTO: 2 % (ref 0–1)
BILIRUB SERPL-MCNC: 1.1 MG/DL (ref 0.2–1)
BUN SERPL-MCNC: 20 MG/DL (ref 5–25)
CALCIUM SERPL-MCNC: 8.5 MG/DL (ref 8.3–10.1)
CHLORIDE SERPL-SCNC: 106 MMOL/L (ref 100–108)
CO2 SERPL-SCNC: 22 MMOL/L (ref 21–32)
CREAT SERPL-MCNC: 1.16 MG/DL (ref 0.6–1.3)
EOSINOPHIL # BLD AUTO: 0.4 THOUSAND/ΜL (ref 0–0.61)
EOSINOPHIL NFR BLD AUTO: 5 % (ref 0–6)
ERYTHROCYTE [DISTWIDTH] IN BLOOD BY AUTOMATED COUNT: 15.5 % (ref 11.6–15.1)
GFR SERPL CREATININE-BSD FRML MDRD: 59.3 ML/MIN/1.73SQ M
GLUCOSE SERPL-MCNC: 87 MG/DL (ref 65–140)
HCT VFR BLD AUTO: 32.1 % (ref 42–52)
HGB BLD-MCNC: 10.7 G/DL (ref 14–18)
INR PPP: 1.13 (ref 0.86–1.16)
LYMPHOCYTES # BLD AUTO: 0.7 THOUSANDS/ΜL (ref 0.6–4.47)
LYMPHOCYTES NFR BLD AUTO: 9 % (ref 14–44)
MAGNESIUM SERPL-MCNC: 2.1 MG/DL (ref 1.6–2.6)
MCH RBC QN AUTO: 29.4 PG (ref 27–31)
MCHC RBC AUTO-ENTMCNC: 33.3 G/DL (ref 31.4–37.4)
MCV RBC AUTO: 88 FL (ref 82–98)
MONOCYTES # BLD AUTO: 0.9 THOUSAND/ΜL (ref 0.17–1.22)
MONOCYTES NFR BLD AUTO: 11 % (ref 4–12)
MRSA NOSE QL CULT: ABNORMAL
NEUTROPHILS # BLD AUTO: 5.7 THOUSANDS/ΜL (ref 1.85–7.62)
NEUTS SEG NFR BLD AUTO: 73 % (ref 43–75)
NRBC BLD AUTO-RTO: 0 /100 WBCS
PLATELET # BLD AUTO: 359 THOUSANDS/UL (ref 130–400)
PMV BLD AUTO: 8.7 FL (ref 8.9–12.7)
POTASSIUM SERPL-SCNC: 3.9 MMOL/L (ref 3.5–5.3)
PROT SERPL-MCNC: 6.2 G/DL (ref 6.4–8.2)
PROTHROMBIN TIME: 11.9 SECONDS (ref 9.4–11.7)
RBC # BLD AUTO: 3.64 MILLION/UL (ref 4.7–6.1)
SODIUM SERPL-SCNC: 139 MMOL/L (ref 136–145)
WBC # BLD AUTO: 7.9 THOUSAND/UL (ref 4.8–10.8)

## 2017-06-26 PROCEDURE — 85610 PROTHROMBIN TIME: CPT | Performed by: PHYSICIAN ASSISTANT

## 2017-06-26 PROCEDURE — 83735 ASSAY OF MAGNESIUM: CPT | Performed by: PHYSICIAN ASSISTANT

## 2017-06-26 PROCEDURE — 97163 PT EVAL HIGH COMPLEX 45 MIN: CPT

## 2017-06-26 PROCEDURE — 85025 COMPLETE CBC W/AUTO DIFF WBC: CPT | Performed by: PHYSICIAN ASSISTANT

## 2017-06-26 PROCEDURE — C9113 INJ PANTOPRAZOLE SODIUM, VIA: HCPCS | Performed by: FAMILY MEDICINE

## 2017-06-26 PROCEDURE — 97110 THERAPEUTIC EXERCISES: CPT

## 2017-06-26 PROCEDURE — 0DBK8ZZ EXCISION OF ASCENDING COLON, VIA NATURAL OR ARTIFICIAL OPENING ENDOSCOPIC: ICD-10-PCS | Performed by: INTERNAL MEDICINE

## 2017-06-26 PROCEDURE — 88305 TISSUE EXAM BY PATHOLOGIST: CPT | Performed by: INTERNAL MEDICINE

## 2017-06-26 PROCEDURE — 80053 COMPREHEN METABOLIC PANEL: CPT | Performed by: PHYSICIAN ASSISTANT

## 2017-06-26 PROCEDURE — G8988 SELF CARE GOAL STATUS: HCPCS

## 2017-06-26 PROCEDURE — G8978 MOBILITY CURRENT STATUS: HCPCS

## 2017-06-26 PROCEDURE — G8979 MOBILITY GOAL STATUS: HCPCS

## 2017-06-26 PROCEDURE — 97166 OT EVAL MOD COMPLEX 45 MIN: CPT

## 2017-06-26 PROCEDURE — G8987 SELF CARE CURRENT STATUS: HCPCS

## 2017-06-26 RX ORDER — EPHEDRINE SULFATE 50 MG/ML
INJECTION, SOLUTION INTRAVENOUS AS NEEDED
Status: DISCONTINUED | OUTPATIENT
Start: 2017-06-26 | End: 2017-06-26 | Stop reason: SURG

## 2017-06-26 RX ORDER — PROPOFOL 10 MG/ML
INJECTION, EMULSION INTRAVENOUS AS NEEDED
Status: DISCONTINUED | OUTPATIENT
Start: 2017-06-26 | End: 2017-06-26 | Stop reason: SURG

## 2017-06-26 RX ADMIN — LIDOCAINE HYDROCHLORIDE 40 MG: 20 INJECTION, SOLUTION INTRAVENOUS at 13:39

## 2017-06-26 RX ADMIN — PROPOFOL 50 MG: 10 INJECTION, EMULSION INTRAVENOUS at 13:39

## 2017-06-26 RX ADMIN — PANTOPRAZOLE SODIUM 40 MG: 40 INJECTION, POWDER, FOR SOLUTION INTRAVENOUS at 09:46

## 2017-06-26 RX ADMIN — PREDNISONE 2.5 MG: 1 TABLET ORAL at 09:45

## 2017-06-26 RX ADMIN — METOPROLOL TARTRATE 50 MG: 50 TABLET ORAL at 17:20

## 2017-06-26 RX ADMIN — PROPOFOL 20 MG: 10 INJECTION, EMULSION INTRAVENOUS at 13:40

## 2017-06-26 RX ADMIN — PROPOFOL 20 MG: 10 INJECTION, EMULSION INTRAVENOUS at 13:45

## 2017-06-26 RX ADMIN — SODIUM CHLORIDE, SODIUM LACTATE, POTASSIUM CHLORIDE, AND CALCIUM CHLORIDE 50 ML/HR: .6; .31; .03; .02 INJECTION, SOLUTION INTRAVENOUS at 00:41

## 2017-06-26 RX ADMIN — GABAPENTIN 300 MG: 300 CAPSULE ORAL at 17:16

## 2017-06-26 RX ADMIN — PANTOPRAZOLE SODIUM 40 MG: 40 INJECTION, POWDER, FOR SOLUTION INTRAVENOUS at 21:59

## 2017-06-26 RX ADMIN — PROPOFOL 20 MG: 10 INJECTION, EMULSION INTRAVENOUS at 13:49

## 2017-06-26 RX ADMIN — METOPROLOL TARTRATE 50 MG: 50 TABLET ORAL at 09:46

## 2017-06-26 RX ADMIN — PRAVASTATIN SODIUM 20 MG: 20 TABLET ORAL at 17:16

## 2017-06-26 RX ADMIN — LOSARTAN POTASSIUM 50 MG: 50 TABLET, FILM COATED ORAL at 09:46

## 2017-06-26 RX ADMIN — PROPOFOL 20 MG: 10 INJECTION, EMULSION INTRAVENOUS at 13:47

## 2017-06-26 RX ADMIN — SODIUM CHLORIDE, SODIUM LACTATE, POTASSIUM CHLORIDE, AND CALCIUM CHLORIDE: .6; .31; .03; .02 INJECTION, SOLUTION INTRAVENOUS at 13:18

## 2017-06-26 RX ADMIN — PROPOFOL 20 MG: 10 INJECTION, EMULSION INTRAVENOUS at 13:52

## 2017-06-26 RX ADMIN — PROPOFOL 20 MG: 10 INJECTION, EMULSION INTRAVENOUS at 13:58

## 2017-06-26 RX ADMIN — PROPOFOL 20 MG: 10 INJECTION, EMULSION INTRAVENOUS at 13:43

## 2017-06-26 RX ADMIN — EPHEDRINE SULFATE 10 MG: 50 INJECTION, SOLUTION INTRAMUSCULAR; INTRAVENOUS; SUBCUTANEOUS at 13:58

## 2017-06-26 RX ADMIN — PROPOFOL 20 MG: 10 INJECTION, EMULSION INTRAVENOUS at 13:55

## 2017-06-26 RX ADMIN — GABAPENTIN 300 MG: 300 CAPSULE ORAL at 09:46

## 2017-06-27 ENCOUNTER — APPOINTMENT (INPATIENT)
Dept: RADIOLOGY | Facility: HOSPITAL | Age: 82
DRG: 378 | End: 2017-06-27
Payer: MEDICARE

## 2017-06-27 VITALS
TEMPERATURE: 98.9 F | DIASTOLIC BLOOD PRESSURE: 70 MMHG | RESPIRATION RATE: 18 BRPM | HEART RATE: 61 BPM | SYSTOLIC BLOOD PRESSURE: 161 MMHG | WEIGHT: 167.77 LBS | BODY MASS INDEX: 24.02 KG/M2 | OXYGEN SATURATION: 96 % | HEIGHT: 70 IN

## 2017-06-27 PROBLEM — K92.2 LOWER GI BLEED: Status: RESOLVED | Noted: 2017-01-16 | Resolved: 2017-06-27

## 2017-06-27 PROBLEM — N17.9 AKI (ACUTE KIDNEY INJURY) (HCC): Status: ACTIVE | Noted: 2017-06-27

## 2017-06-27 PROBLEM — K92.2 LOWER GI BLEED: Status: ACTIVE | Noted: 2017-01-16

## 2017-06-27 PROBLEM — R79.89 ABNORMAL LFTS: Status: ACTIVE | Noted: 2017-06-27

## 2017-06-27 PROBLEM — N17.9 AKI (ACUTE KIDNEY INJURY) (HCC): Status: RESOLVED | Noted: 2017-06-27 | Resolved: 2017-06-27

## 2017-06-27 LAB
ALBUMIN SERPL BCP-MCNC: 2.1 G/DL (ref 3.5–5)
ALP SERPL-CCNC: 83 U/L (ref 46–116)
ALT SERPL W P-5'-P-CCNC: 92 U/L (ref 12–78)
ANION GAP SERPL CALCULATED.3IONS-SCNC: 11 MMOL/L (ref 4–13)
AST SERPL W P-5'-P-CCNC: 69 U/L (ref 5–45)
BASOPHILS # BLD AUTO: 0.2 THOUSANDS/ΜL (ref 0–0.1)
BASOPHILS NFR BLD AUTO: 2 % (ref 0–1)
BILIRUB SERPL-MCNC: 0.8 MG/DL (ref 0.2–1)
BUN SERPL-MCNC: 18 MG/DL (ref 5–25)
CALCIUM SERPL-MCNC: 8.3 MG/DL (ref 8.3–10.1)
CHLORIDE SERPL-SCNC: 106 MMOL/L (ref 100–108)
CO2 SERPL-SCNC: 25 MMOL/L (ref 21–32)
CREAT SERPL-MCNC: 1.05 MG/DL (ref 0.6–1.3)
EOSINOPHIL # BLD AUTO: 0.3 THOUSAND/ΜL (ref 0–0.61)
EOSINOPHIL NFR BLD AUTO: 4 % (ref 0–6)
ERYTHROCYTE [DISTWIDTH] IN BLOOD BY AUTOMATED COUNT: 15.7 % (ref 11.6–15.1)
GFR SERPL CREATININE-BSD FRML MDRD: >60 ML/MIN/1.73SQ M
GLUCOSE SERPL-MCNC: 96 MG/DL (ref 65–140)
HCT VFR BLD AUTO: 31.1 % (ref 42–52)
HGB BLD-MCNC: 10.3 G/DL (ref 14–18)
INR PPP: 1.13 (ref 0.86–1.16)
LYMPHOCYTES # BLD AUTO: 0.6 THOUSANDS/ΜL (ref 0.6–4.47)
LYMPHOCYTES NFR BLD AUTO: 8 % (ref 14–44)
MCH RBC QN AUTO: 29.4 PG (ref 27–31)
MCHC RBC AUTO-ENTMCNC: 33.2 G/DL (ref 31.4–37.4)
MCV RBC AUTO: 89 FL (ref 82–98)
MONOCYTES # BLD AUTO: 0.8 THOUSAND/ΜL (ref 0.17–1.22)
MONOCYTES NFR BLD AUTO: 10 % (ref 4–12)
NEUTROPHILS # BLD AUTO: 6 THOUSANDS/ΜL (ref 1.85–7.62)
NEUTS SEG NFR BLD AUTO: 77 % (ref 43–75)
NRBC BLD AUTO-RTO: 0 /100 WBCS
PLATELET # BLD AUTO: 411 THOUSANDS/UL (ref 130–400)
PMV BLD AUTO: 8.2 FL (ref 8.9–12.7)
POTASSIUM SERPL-SCNC: 3.6 MMOL/L (ref 3.5–5.3)
PROT SERPL-MCNC: 6.2 G/DL (ref 6.4–8.2)
PROTHROMBIN TIME: 11.9 SECONDS (ref 9.4–11.7)
RBC # BLD AUTO: 3.51 MILLION/UL (ref 4.7–6.1)
SODIUM SERPL-SCNC: 142 MMOL/L (ref 136–145)
WBC # BLD AUTO: 7.9 THOUSAND/UL (ref 4.8–10.8)

## 2017-06-27 PROCEDURE — 97110 THERAPEUTIC EXERCISES: CPT

## 2017-06-27 PROCEDURE — 76705 ECHO EXAM OF ABDOMEN: CPT

## 2017-06-27 PROCEDURE — 85025 COMPLETE CBC W/AUTO DIFF WBC: CPT | Performed by: PHYSICIAN ASSISTANT

## 2017-06-27 PROCEDURE — C9113 INJ PANTOPRAZOLE SODIUM, VIA: HCPCS | Performed by: FAMILY MEDICINE

## 2017-06-27 PROCEDURE — 85610 PROTHROMBIN TIME: CPT | Performed by: PHYSICIAN ASSISTANT

## 2017-06-27 PROCEDURE — 80053 COMPREHEN METABOLIC PANEL: CPT | Performed by: FAMILY MEDICINE

## 2017-06-27 RX ORDER — POTASSIUM CHLORIDE 20 MEQ/1
40 TABLET, EXTENDED RELEASE ORAL ONCE
Status: COMPLETED | OUTPATIENT
Start: 2017-06-27 | End: 2017-06-27

## 2017-06-27 RX ORDER — PANTOPRAZOLE SODIUM 40 MG/1
40 TABLET, DELAYED RELEASE ORAL DAILY
Qty: 30 TABLET | Refills: 0 | Status: SHIPPED | OUTPATIENT
Start: 2017-06-27 | End: 2018-02-19

## 2017-06-27 RX ORDER — LOSARTAN POTASSIUM 100 MG/1
50 TABLET ORAL 2 TIMES DAILY
Qty: 30 TABLET | Refills: 0 | Status: SHIPPED | OUTPATIENT
Start: 2017-06-27 | End: 2018-02-19

## 2017-06-27 RX ADMIN — PANTOPRAZOLE SODIUM 40 MG: 40 INJECTION, POWDER, FOR SOLUTION INTRAVENOUS at 09:13

## 2017-06-27 RX ADMIN — METOPROLOL TARTRATE 50 MG: 50 TABLET ORAL at 09:12

## 2017-06-27 RX ADMIN — LOSARTAN POTASSIUM 50 MG: 50 TABLET, FILM COATED ORAL at 09:11

## 2017-06-27 RX ADMIN — GABAPENTIN 300 MG: 300 CAPSULE ORAL at 09:12

## 2017-06-27 RX ADMIN — PREDNISONE 2.5 MG: 1 TABLET ORAL at 09:12

## 2017-06-27 RX ADMIN — POTASSIUM CHLORIDE 40 MEQ: 1500 TABLET, EXTENDED RELEASE ORAL at 13:15

## 2017-06-29 ENCOUNTER — GENERIC CONVERSION - ENCOUNTER (OUTPATIENT)
Dept: OTHER | Facility: OTHER | Age: 82
End: 2017-06-29

## 2017-06-29 ENCOUNTER — APPOINTMENT (OUTPATIENT)
Dept: OCCUPATIONAL THERAPY | Facility: CLINIC | Age: 82
End: 2017-06-29
Payer: MEDICARE

## 2017-06-29 ENCOUNTER — ALLSCRIPTS OFFICE VISIT (OUTPATIENT)
Dept: OTHER | Facility: OTHER | Age: 82
End: 2017-06-29

## 2017-06-29 DIAGNOSIS — I25.10 ATHEROSCLEROTIC HEART DISEASE OF NATIVE CORONARY ARTERY WITHOUT ANGINA PECTORIS: ICD-10-CM

## 2017-06-29 DIAGNOSIS — I10 ESSENTIAL (PRIMARY) HYPERTENSION: ICD-10-CM

## 2017-06-29 DIAGNOSIS — Z87.19 PERSONAL HISTORY OF OTHER DISEASES OF THE DIGESTIVE SYSTEM: ICD-10-CM

## 2017-06-30 LAB
ATRIAL RATE: 70 BPM
ATRIAL RATE: 83 BPM
P AXIS: -25 DEGREES
P AXIS: -29 DEGREES
PR INTERVAL: 186 MS
PR INTERVAL: 192 MS
QRS AXIS: -22 DEGREES
QRS AXIS: -25 DEGREES
QRSD INTERVAL: 80 MS
QRSD INTERVAL: 80 MS
QT INTERVAL: 374 MS
QT INTERVAL: 404 MS
QTC INTERVAL: 436 MS
QTC INTERVAL: 439 MS
T WAVE AXIS: -25 DEGREES
T WAVE AXIS: 37 DEGREES
VENTRICULAR RATE: 70 BPM
VENTRICULAR RATE: 83 BPM

## 2017-07-05 ENCOUNTER — GENERIC CONVERSION - ENCOUNTER (OUTPATIENT)
Dept: OTHER | Facility: OTHER | Age: 82
End: 2017-07-05

## 2017-07-06 ENCOUNTER — GENERIC CONVERSION - ENCOUNTER (OUTPATIENT)
Dept: OTHER | Facility: OTHER | Age: 82
End: 2017-07-06

## 2017-07-08 LAB
ATRIAL RATE: 82 BPM
P AXIS: 39 DEGREES
PR INTERVAL: 190 MS
QRS AXIS: -13 DEGREES
QRSD INTERVAL: 80 MS
QT INTERVAL: 368 MS
QTC INTERVAL: 429 MS
T WAVE AXIS: 15 DEGREES
VENTRICULAR RATE: 82 BPM

## 2017-07-31 ENCOUNTER — ALLSCRIPTS OFFICE VISIT (OUTPATIENT)
Dept: OTHER | Facility: OTHER | Age: 82
End: 2017-07-31

## 2017-07-31 DIAGNOSIS — I25.10 ATHEROSCLEROTIC HEART DISEASE OF NATIVE CORONARY ARTERY WITHOUT ANGINA PECTORIS: ICD-10-CM

## 2017-07-31 DIAGNOSIS — I10 ESSENTIAL (PRIMARY) HYPERTENSION: ICD-10-CM

## 2017-07-31 DIAGNOSIS — M06.9 RHEUMATOID ARTHRITIS(714.0): ICD-10-CM

## 2017-07-31 DIAGNOSIS — I73.9 PERIPHERAL VASCULAR DISEASE (HCC): ICD-10-CM

## 2017-09-27 ENCOUNTER — APPOINTMENT (OUTPATIENT)
Dept: LAB | Facility: CLINIC | Age: 82
End: 2017-09-27
Payer: MEDICARE

## 2017-09-27 DIAGNOSIS — I25.10 ATHEROSCLEROTIC HEART DISEASE OF NATIVE CORONARY ARTERY WITHOUT ANGINA PECTORIS: ICD-10-CM

## 2017-09-27 DIAGNOSIS — I73.9 PERIPHERAL VASCULAR DISEASE (HCC): ICD-10-CM

## 2017-09-27 DIAGNOSIS — M06.9 RHEUMATOID ARTHRITIS(714.0): ICD-10-CM

## 2017-09-27 DIAGNOSIS — I10 ESSENTIAL (PRIMARY) HYPERTENSION: ICD-10-CM

## 2017-09-27 LAB
ALBUMIN SERPL BCP-MCNC: 3.1 G/DL (ref 3.5–5)
ALP SERPL-CCNC: 52 U/L (ref 46–116)
ALT SERPL W P-5'-P-CCNC: 20 U/L (ref 12–78)
ANION GAP SERPL CALCULATED.3IONS-SCNC: 7 MMOL/L (ref 4–13)
AST SERPL W P-5'-P-CCNC: 16 U/L (ref 5–45)
BASOPHILS # BLD AUTO: 0.27 THOUSANDS/ΜL (ref 0–0.1)
BASOPHILS NFR BLD AUTO: 4 % (ref 0–1)
BILIRUB SERPL-MCNC: 0.44 MG/DL (ref 0.2–1)
BUN SERPL-MCNC: 26 MG/DL (ref 5–25)
CALCIUM SERPL-MCNC: 8.7 MG/DL (ref 8.3–10.1)
CHLORIDE SERPL-SCNC: 108 MMOL/L (ref 100–108)
CHOLEST SERPL-MCNC: 144 MG/DL (ref 50–200)
CO2 SERPL-SCNC: 26 MMOL/L (ref 21–32)
CREAT SERPL-MCNC: 1.21 MG/DL (ref 0.6–1.3)
EOSINOPHIL # BLD AUTO: 0.68 THOUSAND/ΜL (ref 0–0.61)
EOSINOPHIL NFR BLD AUTO: 9 % (ref 0–6)
ERYTHROCYTE [DISTWIDTH] IN BLOOD BY AUTOMATED COUNT: 16.9 % (ref 11.6–15.1)
GFR SERPL CREATININE-BSD FRML MDRD: 52 ML/MIN/1.73SQ M
GLUCOSE P FAST SERPL-MCNC: 97 MG/DL (ref 65–99)
HCT VFR BLD AUTO: 37.5 % (ref 36.5–49.3)
HDLC SERPL-MCNC: 35 MG/DL (ref 40–60)
HGB BLD-MCNC: 11.7 G/DL (ref 12–17)
IRON SATN MFR SERPL: 16 %
IRON SERPL-MCNC: 48 UG/DL (ref 65–175)
LDLC SERPL CALC-MCNC: 88 MG/DL (ref 0–100)
LYMPHOCYTES # BLD AUTO: 1.31 THOUSANDS/ΜL (ref 0.6–4.47)
LYMPHOCYTES NFR BLD AUTO: 18 % (ref 14–44)
MCH RBC QN AUTO: 28.9 PG (ref 26.8–34.3)
MCHC RBC AUTO-ENTMCNC: 31.2 G/DL (ref 31.4–37.4)
MCV RBC AUTO: 93 FL (ref 82–98)
MONOCYTES # BLD AUTO: 1.05 THOUSAND/ΜL (ref 0.17–1.22)
MONOCYTES NFR BLD AUTO: 14 % (ref 4–12)
NEUTROPHILS # BLD AUTO: 3.99 THOUSANDS/ΜL (ref 1.85–7.62)
NEUTS SEG NFR BLD AUTO: 55 % (ref 43–75)
NRBC BLD AUTO-RTO: 0 /100 WBCS
PLATELET # BLD AUTO: 220 THOUSANDS/UL (ref 149–390)
PMV BLD AUTO: 10.9 FL (ref 8.9–12.7)
POTASSIUM SERPL-SCNC: 3.9 MMOL/L (ref 3.5–5.3)
PROT SERPL-MCNC: 7.1 G/DL (ref 6.4–8.2)
RBC # BLD AUTO: 4.05 MILLION/UL (ref 3.88–5.62)
SODIUM SERPL-SCNC: 141 MMOL/L (ref 136–145)
TIBC SERPL-MCNC: 293 UG/DL (ref 250–450)
TRIGL SERPL-MCNC: 104 MG/DL
WBC # BLD AUTO: 7.33 THOUSAND/UL (ref 4.31–10.16)

## 2017-09-27 PROCEDURE — 80061 LIPID PANEL: CPT

## 2017-09-27 PROCEDURE — 85025 COMPLETE CBC W/AUTO DIFF WBC: CPT

## 2017-09-27 PROCEDURE — 83540 ASSAY OF IRON: CPT

## 2017-09-27 PROCEDURE — 36415 COLL VENOUS BLD VENIPUNCTURE: CPT

## 2017-09-27 PROCEDURE — 83550 IRON BINDING TEST: CPT

## 2017-09-27 PROCEDURE — 80053 COMPREHEN METABOLIC PANEL: CPT

## 2017-09-28 ENCOUNTER — GENERIC CONVERSION - ENCOUNTER (OUTPATIENT)
Dept: OTHER | Facility: OTHER | Age: 82
End: 2017-09-28

## 2017-10-06 ENCOUNTER — ALLSCRIPTS OFFICE VISIT (OUTPATIENT)
Dept: OTHER | Facility: OTHER | Age: 82
End: 2017-10-06

## 2017-10-07 NOTE — PROGRESS NOTES
Assessment  1  Allergic rhinitis (477 9) (J30 9)   2  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 99) (M12 9)   3  Benign essential hypertension (401 1) (I10)   4  CAD (coronary artery disease) (414 00) (I25 10)   5  Hyperlipidemia LDL goal <100 (272 4) (E78 5)   6  Other depression (311) (F32 89)   7  BMI 25 0-25 9,adult (V85 21) (Z68 25)    Plan  Benign essential hypertension, CAD (coronary artery disease), Gout, Hyperlipidemia  LDL goal <100, Iron deficiency anemia due to chronic blood loss    · (1) CBC/PLT/DIFF; Status:Active; Requested for:06Oct2017;    · (1) COMPREHENSIVE METABOLIC PANEL; Status:Active; Requested for:06Oct2017;    · (1) LIPID PANEL FASTING W DIRECT LDL REFLEX; Status:Active; Requested  for:06Oct2017;    · (1) URIC ACID; Status:Active; Requested for:06Oct2017;   Immunization due    · Fluzone High-Dose 0 5 ML Intramuscular Suspension Prefilled Syringe;  administer IM; To Be Done: 78GHC4460    Discussion/Summary    Your blood count and iron levels are good without taking any ironpressure is good and so are kidneys and minerals on your medicationis normal on your cholesterol medication that are taken for risk reduction  is for the discomfort in your feet, you have been taking 300mg 2x/dbe sure it is doing something meaningful, you could reduce the dose to 1x/d instead of 2x/d, and just take it at nightthe symptoms are not worse, then just stay with 1 pill/d taken at night  The patient was counseled regarding risk factor reductions  Possible side effects of new medications were reviewed with the patient/guardian today  The treatment plan was reviewed with the patient/guardian   The patient/guardian understands and agrees with the treatment plan      Chief Complaint  pt present to follow up on lab work done on 9/27/17      History of Present Illness  feet still feel strange, not painful, not numb, worse laying down, no trouble sleeping   The patient states he has been stable with his coronary artery disease symptoms since the last visit  He has no known CAD complications  Symptoms: denies chest pain when at rest,-denies exertional chest pain-and-denies dyspnea  Associated symptoms include no syncope  Medications: the patient is adherent with his medication regimen  The patient states his hyperlipidemia has been stable since the last visit  Comorbid Illnesses: coronary artery disease-and-hypertension  Symptoms: denies muscle pain  Medications: the patient is adherent with his medication regimen  The patient presents for follow-up of essential hypertension  The patient states he has been doing well with his blood pressure control since the last visit  Comorbid Illnesses: coronary artery disease  Symptoms: The patient is currently asymptomatic  Blood pressure control has been good  Review of Systems    Cardiovascular: no chest pain-and-no palpitations  Neurological: no fainting  Active Problems  1  Actinic keratosis (702 0) (L57 0)   2  Adhesive capsulitis of right shoulder (726 0) (M75 01)   3  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 (780 93) (R41 3)   4  Allergic rhinitis (477 9) (J30 9)   5  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 99) (M12 9)   6  Benign colon polyp (211 3) (K63 5)   7  Benign essential hypertension (401 1) (I10)   8  CAD (coronary artery disease) (414 00) (I25 10)   9  Chronic back pain (724 5,338 29) (M54 9,G89 29)   10  Colon cancer screening (V76 51) (Z12 11)   11  Depression screening (V79 0) (Z13 89)   12  Diaphragmatic hernia (553 3) (K44 9)   13  Distal radius fracture, right (813 42) (S52 501A)   14  Gout (274 9) (M10 9)   15  Hemorrhoids (455 6) (K64 9)   16  History of GI diverticular bleed (V12 79) (Z87 19)   17  Hyperlipidemia LDL goal <100 (272 4) (E78 5)   18  Idiopathic peripheral neuropathy (356 9) (G60 9)   19  Immunization due (V05 9) (Z23)   20  Iron deficiency anemia due to chronic blood loss (280 0) (D50 0)   21  Lumbar disc disease (722 93) (M51 9)   22   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)   23  Other depression (311) (F32 89)   24  Overactive bladder (596 51) (N32 81)   25  PAD (peripheral artery disease) (443 9) (I73 9)   26  Prediabetes (790 29) (R73 09)   27  Prostate cancer screening (V76 44) (Z12 5)   28  Rheumatoid arthritis (714 0) (M06 9)   29  Screening for cardiovascular, respiratory, and genitourinary diseases    (V81 2,V81 4,V81 6) (Z13 6,Z13 83,Z13 89)   30  Screening for diabetes mellitus (DM) (V77 1) (Z13 1)   31  Screening for neurological condition (V80 09) (Z13 89)   32  Tricuspid valve disorders, 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)    Past Medical History  1  History of Abnormal blood chemistry (790 6) (R79 9)   2  History of Accidental Puncture / Laceration During A Procedure (998 2)   3  Adenocarcinoma of prostate (185) (C61)   4  History of Ankle pain, unspecified laterality   5  History of Ankle pain, unspecified laterality   6  History of Benign paroxysmal vertigo, unspecified laterality (386 11) (H81 10)   7  History of Bilateral edema of lower extremity (782 3) (R60 0)   8  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)   9  History of Encounter for removal of sutures (V58 32) (Z48 02)   10  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)   11  History of Hematochezia (578 1) (K92 1)   12  History of acute bronchitis (V12 69) (Z87 09)   13  History of backache (V13 59) (Z87 39)   14  History of degenerative disc disease (V13 59) (Z87 39)   15  History of gastrointestinal hemorrhage (V12 79) (Z87 19)   16  History of headache (V13 89) (Z87 898)   17  History of herpes zoster (V12 09) (Z86 19)   18  History of incontinence of feces (V12 79) (Z87 19)   19  History of low back pain (V13 59) (Z87 39)   20  History of rectal bleeding (V12 79) (Z87 19)   21  History of seborrheic keratosis (V13 3) (Z87 2)   22  History of tinea corporis (V12 09) (Z86 19)   23  History of Inflamed seborrheic keratosis (702 11) (L82 0)   24   History of Neoplasm of bone (239 2) (D49 2)   25  History of Organic impotence (607 84) (N52 9)   26  History of Osteoarthritis (715 90) (M19 90)   27  History of Pain, upper back (724 5) (M54 9)   28  History of Paronychia of toe, unspecified laterality (681 11) (L03 039)   29  History of Rheumatoid Arthritis With Atlantoaxial Subluxation (839 01)   30  History of Rib pain (786 50) (R07 81)   31  History of Sigmoid diverticulitis (562 11) (K57 32)   32  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)   33  History of Transient disorder of initiating or maintaining sleep (307 41) (F51 02)   34  History of Xerosis cutis (706 8) (L85 3)    Family History  Mother    1  Family history of Stroke syndrome  Father    2  Family history of hypertension (V17 49) (Z82 49)   3  Family history of Stroke syndrome    The family history was reviewed and updated today  Social History   · Former smoker (E18 04) (Y32 276)  The social history was reviewed and updated today  Current Meds   1  Centrum Silver Oral Tablet; 1 every day; Therapy: 59SOC1028 to  Requested for: 85EXX2134 Recorded   2  Gabapentin 300 MG Oral Capsule; TAKE ONE CAPSULE BY MOUTH TWICE DAILY; Therapy: 64GUZ9101 to (566 324 313)  Requested for: 05EFA7797; Last   Rx:47Fju2154 Ordered   3  Metoprolol Tartrate 50 MG Oral Tablet; take one tablet by mouth twice daily; Therapy: 54SOG0098 to (Evaluate:10Tjx8716)  Requested for: 58IDP7273; Last   Rx:10Vzw0524 Ordered   4  PredniSONE 2 5 MG Oral Tablet; TAKE 1 TABLET DAILY as needed for arthritis; Therapy: 37FOI2641 to (Evaluate:25Znz7879) Recorded   5  Probiata Oral Tablet; Once a day; Therapy: 81SMD5303 to Recorded   6  Sertraline HCl - 25 MG Oral Tablet; TAKE ONE TABLET BY MOUTH ONCE DAILY; Therapy: 21JNJ1212 to (Evaluate:99Pgs1199)  Requested for: 78Ctc0274; Last   Rx:28Wsp4154 Ordered   7  Simvastatin 10 MG Oral Tablet; 1 every day; Therapy: 79Vvz9324 to  Requested for: 56YBN0204 Recorded   8  Tolterodine Tartrate ER 2 MG Oral Capsule Extended Release 24 Hour; 1 po qday; Therapy: 11Aug2016 to (Last Rx:20Apr2017)  Requested for: 21Lvn7002 Ordered   9  Vitamin C 500 MG Oral Capsule; Once a day; Therapy: 47FWF2374 to  Requested for: 39DAR4326 Recorded    Allergies  1  No Known Drug Allergies    Vitals  Vital Signs    Recorded: 15IFT8217 10:50AM   Temperature 97 9 F   Heart Rate 68   Respiration 16   Systolic 757   Diastolic 62   Height 5 ft 8 in   Weight 165 lb    BMI Calculated 25 09   BSA Calculated 1 88     Physical Exam    Constitutional   General appearance: No acute distress, well appearing and well nourished  Eyes   Conjunctiva and lids: No swelling, erythema, or discharge  Pupils and irises: Equal, round and reactive to light  Ears, Nose, Mouth, and Throat   External inspection of ears and nose: Normal     Otoscopic examination: Tympanic membrance translucent with normal light reflex  Canals patent without erythema  Nasal mucosa, septum, and turbinates: Normal without edema or erythema  Oropharynx: Normal with no erythema, edema, exudate or lesions  Pulmonary   Respiratory effort: No increased work of breathing or signs of respiratory distress  Auscultation of lungs: Clear to auscultation, equal breath sounds bilaterally, no wheezes, no rales, no rhonci  Cardiovascular   Auscultation of heart: Normal rate and rhythm, normal S1 and S2, without murmurs  Examination of extremities for edema and/or varicosities: Normal     Carotid pulses: Normal     Abdomen   Abdomen: Non-tender, no masses  Lymphatic   Palpation of lymph nodes in neck: No lymphadenopathy  Musculoskeletal   Gait and station: Normal     Digits and nails: Normal without clubbing or cyanosis  Skin   Skin and subcutaneous tissue: Normal without rashes or lesions      Psychiatric   Mood and affect: Normal          Provider Comments    htn/cad stableokstable off ironmedswean gabapentin trial Signatures   Electronically signed by : Chris Tinoco DO; Oct  6 2017 11:42AM EST                       (Author)

## 2017-11-17 ENCOUNTER — GENERIC CONVERSION - ENCOUNTER (OUTPATIENT)
Dept: OTHER | Facility: OTHER | Age: 82
End: 2017-11-17

## 2018-01-10 NOTE — RESULT NOTES
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;    present at peripheral border and base of biopsy  -- Entire base of lesion not visualized; underlying invasion cannot be   completely excluded  LAB AP SURGICAL ADDITIONAL INFORMATION (Report)     These tests were developed and their performance characteristics   determined by [address] [place] [address] or [employer]  They may not be cleared or approved by the [place]  Food and   Drug Administration  The FDA has determined that such clearance or   approval is not necessary  These tests are used for clinical purposes  They should not be regarded as investigational or for research  This   laboratory has been approved by [place], designated as a high-complexity   laboratory and is qualified to perform these tests  LAB AP GROSS DESCRIPTION (Report)     A  The specimen is received in formalin, labeled with the patient's name   and hospital number, and is designated right zygoma shave  The specimen   consists of one tan non-hairbearing shave of skin measuring 0 5 x 0 3 x   0 1 cm  The surface exhibits a pigmented macule measuring 0 3 x 0 2   centimeters and is contacting the nearest peripheral margin  Margin of   resection is inked blue, and the surface tips are inked red  Entirely   submitted  One cassette, bisected      Note: The estimated total formalin fixation time based upon information   provided by the submitting clinician and the standard processing schedule   is over 72 hours   MAS

## 2018-01-10 NOTE — MISCELLANEOUS
Assessment   1  Benign essential hypertension (401 1) (I10)  2  CAD (coronary artery disease) (414 00) (I25 10)  3  GI bleed (578 9) (K92 2)  4  PAD (peripheral artery disease) (443 9) (I73 9)  5  Iron deficiency anemia due to chronic blood loss (280 0) (D50 0)    Plan  CAD (coronary artery disease)    · Start: Aspir-81 81 MG Oral Tablet Delayed Release; 1 every day  Rx By: Rafat Hawley; Dispense: 0 Days ; #:99 Tablet Delayed Release; Refill: 0;For:   CAD (coronary artery disease); URI = N; Record  Iron deficiency anemia due to chronic blood loss    · (1) CBC/PLT/DIFF; Status:Active; Requested for:23Kdv3507;   Perform:Merged with [facility]; ZGN:59GYR5858; Ordered; For:Iron deficiency anemia   due to chronic blood loss; Ordered By:Joe Doll;   · (1) IRON SATURATION %, TIBC; Status:Active; Requested for:36Gnw0115;   Perform:Merged with [facility]; SHH:57XGE4842; Ordered; For:Iron deficiency anemia   due to chronic blood loss; Ordered By:Gisela Doll;  Screening for genitourinary condition    · *VB - Urinary Incontinence Screen (Dx Z13 89 Screen for UI); Status:Resulted - Requires  Verification,Retrospective By Protocol Authorization;   Done: 12BJA9949 10:33AM  Performed: In Office; Rafat Roberts; Last Updated By:Jacques Mann; 2/15/2017   10:34:22 AM;Ordered; For:Screening for genitourinary condition; Ordered By:Gisela Doll;    Discussion/Summary  Discussion Summary: You can stop the gabapentin (for feet pain/neuropathy) to see if it truly made a difference  It could be restarted in the future if you do note benefit  Stay on iron 2x/d until your next level in about 1 month, then if good, we could stop iron entirely and recheck hemoglobin and iron in the future for stability  Aspirin 81mg/d may be a slightly option than clopidogrel for your heart protection but you must keep an eye on the color of your stool     Counseling Documentation With Imm: The patient, patient's family was counseled regarding risk factor reductions, impressions  Chief Complaint  I spoke with Galilea Baca and his daughter Ronald Donahue on 2/7/17 after his discharge from B to home this afternoon  I reviewed his medications with Ronald Donahue and updated his chart accordingly  Ronald Donahue states that they haven't been giving him the Donepezil at home since " he is not confused but he is hard of hearing" Dr Yovana Galindo made aware of this  I explained to Ronald Donahue that as per Dr Yovana Galindo, his Hgb is improving (8 9 today), and he needs to stay on his iron pills as instructed and have his CBC done in one week  They have the lab slip for that  I reminded her that he should be following  low fiber diet and avoid seeds and skins  Ronald Donahue is aware to call our office at any time with any questions or concerns and she is also aware that if he has any chest pain, dyspnea, weakness, dizziness, palpitations, etc they should take him to the ER [facility] LPN   Chief Complaint Free Text Note Form: Seen for a TCM  er/cma  History of Present Illness  TCM Communication [facility]: The patient is being contacted for follow-up after hospitalization and 8d ago  Hospital records were reviewed and hgb better, iron sat good on bid iron, stool dark but no blood seen  He was hospitalized at and [facility]  The date of discharge: 2/7/17  Diagnosis: Rectal Bleed, Diverticulitis  He was discharged to home  Medications reviewed and updated today  He scheduled a follow up appointment  Counseling was provided to the patient and patient's family  Daughter Ronald Donahue  Topics counseled included instructions for management, risk factor reductions, patient and family education, home health agency benefits, activities of daily living and importance of compliance with treatment     Communication performed and completed by [facility] LPN 6/5/73   HPI: daughter thinks aricept not helping, mostly hearing issues   gets nightmares with aricept  feet about same, gabapentin $$  willing to try w/o  off plavix due to gi bleed, risks/benefits of meds advised, asa 81mg/d option  pad stable on pletal  htn/chol stable      Review of Systems  Complete-Male:   Cardiovascular: no chest pain  Respiratory: no shortness of breath  Gastrointestinal: no abdominal pain  Preventive Quality 65 Older:   Preventive Quality 65 and Older: The patient is currently experiencing urinary symptoms  Urinary Incontinence Symptoms includes: urinary incontinence       Active Problems    1  Abnormal blood chemistry (790 6) (R79 9)  2  Actinic keratosis (702 0) (L57 0)  3  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 (780 93) (R41 3)  4  Allergic rhinitis (477 9) (J30 9)  5  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 99) (M12 9)  6  Benign colon polyp (211 3) (K63 5)  7  Benign essential hypertension (401 1) (I10)  8  BMI 26 0-26 9,adult (V85 22) (Z68 26)  9  Bowel incontinence (787 60) (R15 9)  10  CAD (coronary artery disease) (414 00) (I25 10)  11  Chronic back pain (724 5,338 29) (M54 9,G89 29)  12  Colon cancer screening (V76 51) (Z12 11)  13  Dementia (294 20) (F03 90)  14  Depression screening (V79 0) (Z13 89)  15  Diaphragmatic hernia (553 3) (K44 9)  16  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)  17  Gout (274 9) (M10 9)  18  Hemorrhoids (455 6) (K64 9)  19  History of degenerative disc disease (V13 59) (Z87 39)  20  Hyperlipidemia LDL goal <100 (272 4) (E78 5)  21  Idiopathic peripheral neuropathy (356 9) (G60 9)  22  Immunization due (V05 9) (Z23)  23  Low back pain (724 2) (M54 5)  24  Lumbar disc disease (722 93) (M51 9)  25  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)  26  Organic impotence (607 84) (N52 9)  27  Overactive bladder (596 51) (N32 81)  28  PAD (peripheral artery disease) (443 9) (I73 9)  29  Pain, upper back (724 5) (M54 9)  30  Prediabetes (790 29) (R73 09)  31  Prostate cancer screening (V76 44) (Z12 5)  32  Rheumatoid arthritis (714 0) (M06 9)  33  Rib pain (786 50) (R07 81)  34  Screening for diabetes mellitus (DM) (V77 1) (Z13 1)  35   Screening for neurological condition (V80 09) (Z13 89)  36  Sigmoid diverticulitis (562 11) (K57 32)  37  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)  38  Tinea corporis (110 5) (B35 4)  39  Transient disorder of initiating or maintaining sleep (307 41) (F51 02)  40  Tricuspid valve disorders, 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)  41  Xerosis cutis (706 8) (L85 3)    Osteoarthrosis Of Multiple Sites (715 98)       Screening for genitourinary condition (V81 6) (Z13 89)          Past Medical History   1  History of Accidental Puncture / Laceration During A Procedure (998 2)  2  Adenocarcinoma of prostate (185) (C61)  3  History of Ankle pain, unspecified laterality  4  History of Ankle pain, unspecified laterality  5  History of Benign paroxysmal vertigo, unspecified laterality (386 11) (H81 10)  6  History of Bilateral edema of lower extremity (782 3) (R60 0)  7  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)  8  History of Encounter for removal of sutures (V58 32) (Z48 02)  9  History of acute bronchitis (V12 69) (Z87 09)  10  History of backache (V13 59) (Z87 39)  11  History of headache (V13 89) (Z87 898)  12  History of herpes zoster (V12 09) (Z86 19)  13  History of seborrheic keratosis (V13 3) (Z87 2)  14  History of Inflamed seborrheic keratosis (702 11) (L82 0)  15  History of Neoplasm of bone (239 2) (D49 2)  16  History of Paronychia of toe, unspecified laterality (681 11) (L03 039)  17  History of Rheumatoid Arthritis With Atlantoaxial Subluxation (839 01)    Family History  Mother   1  Family history of Stroke syndrome  Father   2  Family history of hypertension (V17 49) (Z82 49)  3  Family history of Stroke syndrome  Family History Reviewed: The family history was reviewed and updated today  Social History    · Former smoker (T57 14) (F75 256)   · Never a smoker  Social History Reviewed: The social history was reviewed and updated today  Current Meds  1   Centrum Silver Oral Tablet; 1 every day; Therapy: 69ZPG8301 to  Requested for: 51Fne9815 Recorded  2  Cilostazol 100 MG Oral Tablet; TAKE 1 TABLET TWICE DAILY; Therapy: 02MZT2692 to Recorded  3  Feosol 325 (65 Fe) MG Oral Tablet; TAKE 1 TABLET TWICE DAILY; Therapy: (Recorded:07Feb2017) to Recorded  4  Florastor 250 MG Oral Capsule; TAKE 1 CAPSULE TWICE DAILY; Therapy: (Recorded:07Feb2017) to Recorded  5  Gabapentin 300 MG Oral Capsule; TAKE 1 CAPSULE TWICE DAILY; Therapy: 30ANS5242 to (Evaluate:07Jan2017); Last Rx:35Eqj3325 Ordered  6  Losartan Potassium 100 MG Oral Tablet; 1 DAILY; Therapy: (Recorded:07Feb2017) to Recorded  7  Metoprolol Tartrate 50 MG Oral Tablet; take one tablet by mouth twice daily; Therapy: 53AEW1892 to (Evaluate:80Xqx5840)  Requested for: 11Aug2016; Last   Rx:33Nad6480 Ordered  8  Simvastatin 40 MG Oral Tablet; TAKE ONE TABLET BY MOUTH ONCE DAILY; Therapy: 99Bue4688 to (Evaluate:39Ejq0926)  Requested for: 11Aug2016; Last   Rx:64Sle6465 Ordered  9  Tolterodine Tartrate ER 2 MG Oral Capsule Extended Release 24 Hour; 1 po qday; Therapy: 11Aug2016 to (Last Rx:11Aug2016)  Requested for: 11Aug2016 Ordered  10  Vitamin C CAPS; 1 every day; Therapy: 12Jun2007 to  Requested for: 72ATB6553 Recorded  Medication List Reviewed: The medication list was reviewed and updated today  Allergies   1  No Known Drug Allergies    Vitals  Signs   Recorded: 55JIF2420 10:27AM   Temperature: 97 2 F  Heart Rate: 68  Respiration: 16  Systolic: 507  Diastolic: 70  Height: 5 ft 8 in  Weight: 166 lb 6 08 oz  BMI Calculated: 25 3  BSA Calculated: 1 89    Physical Exam    Constitutional   General appearance: No acute distress, well appearing and well nourished  Eyes   Conjunctiva and lids: No swelling, erythema, or discharge  Pupils and irises: Equal, round and reactive to light      Ears, Nose, Mouth, and Throat   External inspection of ears and nose: Normal     Otoscopic examination: Tympanic membrance translucent with normal light reflex  Canals patent without erythema  Nasal mucosa, septum, and turbinates: Normal without edema or erythema  Oropharynx: Normal with no erythema, edema, exudate or lesions  Pulmonary   Respiratory effort: No increased work of breathing or signs of respiratory distress  Auscultation of lungs: Clear to auscultation, equal breath sounds bilaterally, no wheezes, no rales, no rhonci  Cardiovascular   Palpation of heart: Normal PMI, no thrills  Auscultation of heart: Normal rate and rhythm, normal S1 and S2, without murmurs  Examination of extremities for edema and/or varicosities: Normal     Carotid pulses: Normal     Abdomen   Abdomen: Non-tender, no masses  Liver and spleen: No hepatomegaly or splenomegaly  Lymphatic   Palpation of lymph nodes in neck: No lymphadenopathy  Musculoskeletal   Gait and station: Normal     Digits and nails: Normal without clubbing or cyanosis  Skin   Skin and subcutaneous tissue: Normal without rashes or lesions      Psychiatric   Mood and affect: Normal          Provider Comments  Provider Comments:   aricept caused nightmares  dc  also stop gabapentin and monitor  htn/cad/chol stable  asa 81mg suggested  stay off plavix  pad stable on pletal  iron/cbc f/u to see if ok on less iron in future      Signatures   Electronically signed by : Otis Jefferson, ; Feb 7 2017  3:00PM EST                       (Co-author)    Electronically signed by : Nelson Last DO; Feb 15 2017 11:24PM EST                       (Author)    Electronically signed by : Nelson Last DO; Feb 16 2017  1:23PM EST                       (Author)

## 2018-01-12 VITALS
WEIGHT: 164 LBS | DIASTOLIC BLOOD PRESSURE: 82 MMHG | HEART RATE: 70 BPM | HEIGHT: 68 IN | BODY MASS INDEX: 24.86 KG/M2 | SYSTOLIC BLOOD PRESSURE: 122 MMHG

## 2018-01-12 VITALS
SYSTOLIC BLOOD PRESSURE: 116 MMHG | WEIGHT: 165 LBS | DIASTOLIC BLOOD PRESSURE: 80 MMHG | BODY MASS INDEX: 25.01 KG/M2 | HEIGHT: 68 IN

## 2018-01-12 NOTE — PROGRESS NOTES
Assessment    1  Distal radius fracture, right (443 50) (G31 892S)    Discussion/Summary    Cassie Britt has a minimally displaced distal radius fracture visible on x-ray from one day ago  He is about one week out from his injury  We did place him in a short arm cast in the office today  I did choose to make the cast slightly short to avoid causing abrasion over his healing wound  I would like to bring him back to the office in 2 weeks to remove the cast to prevent skin breakdown  We will repeat his x-ray at that time  Chief Complaint  Wrist pain      History of Present Illness  HPI: Cassie Britt is a pleasant 63-year-old male who presents with right wrist pain after a fall occurring on 3/1/17  He tripped and fell forward onto his extended right hand  He had some pain and swelling but didn't think much of the injury for about a week  When the bruising and swelling persisted as well as the pain he then presented to the emergency room for evaluation  X-rays then showed a minimally displaced distal radius fracture  He was placed in a wrist splint and advised follow-up in our office  He continues to have pain in the distal aspect of his wrist that is mild and constant and aching  It worsens with movement of the wrist  He denies any numbness tingling into his fingers  He does have a abrasion on the dorsum of his forearm from a previous fall that is well-healed  Review of Systems    Constitutional: No fever or chills, feels well, no tiredness, no recent weight loss or weight gain  Eyes: No complaints of red eyes, no eyesight problems  ENT: no complaints of loss of hearing, no nosebleeds, no sore throat  Cardiovascular: No complaints of chest pain, no palpitations, no leg claudication or lower extremity edema  Respiratory: No complaints of shortness of breath, no wheezing, no cough  Gastrointestinal: No complaints of abdominal pain, no constipation, no nausea or vomiting, no diarrhea or bloody stools  Genitourinary: No complaints of dysuria or incontinence, no hesitancy, no nocturia  Musculoskeletal: as noted in HPI  Integumentary: No complaints of skin rash or lesion, no itching or dry skin, no skin wounds  Neurological: No complaints of headache, no confusion, no numbness or tingling, no dizziness  Psychiatric: No suicidal thoughts, no anxiety, no depression  Endocrine: No muscle weakness, no frequent urination, no excessive thirst, no feelings of weakness  Active Problems    1  Abnormal blood chemistry (790 6) (R79 9)   2  Actinic keratosis (702 0) (L57 0)   3  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 (780 93) (R41 3)   4  Allergic rhinitis (477 9) (J30 9)   5  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 99) (M12 9)   6  Benign colon polyp (211 3) (K63 5)   7  Benign essential hypertension (401 1) (I10)   8  BMI 26 0-26 9,adult (V85 22) (Z68 26)   9  Bowel incontinence (787 60) (R15 9)   10  CAD (coronary artery disease) (414 00) (I25 10)   11  Chronic back pain (724 5,338 29) (M54 9,G89 29)   12  Colon cancer screening (V76 51) (Z12 11)   13  Dementia (294 20) (F03 90)   14  Depression screening (V79 0) (Z13 89)   15  Diaphragmatic hernia (553 3) (K44 9)   16  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)   17  GI bleed (578 9) (K92 2)   18  Gout (274 9) (M10 9)   19  Hemorrhoids (455 6) (K64 9)   20  History of degenerative disc disease (V13 59) (Z87 39)   21  Hyperlipidemia LDL goal <100 (272 4) (E78 5)   22  Idiopathic peripheral neuropathy (356 9) (G60 9)   23  Immunization due (V05 9) (Z23)   24  Iron deficiency anemia due to chronic blood loss (280 0) (D50 0)   25  Low back pain (724 2) (M54 5)   26  Lumbar disc disease (722 93) (M51 9)   27  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)   28  Organic impotence (607 84) (N52 9)   29  Osteoarthritis (715 90) (M19 90)   30  Overactive bladder (596 51) (N32 81)   31  PAD (peripheral artery disease) (443 9) (I73 9)   32  Pain, upper back (724 5) (M54 9)   33   Prediabetes (790 29) (R73 09)   34  Prostate cancer screening (V76 44) (Z12 5)   35  Rheumatoid arthritis (714 0) (M06 9)   36  Rib pain (786 50) (R07 81)   37  Screening for cardiovascular, respiratory, and genitourinary diseases    (V81 2,V81 4,V81 6) (Z13 6,Z13 83,Z13 89)   38  Screening for diabetes mellitus (DM) (V77 1) (Z13 1)   39  Screening for genitourinary condition (V81 6) (Z13 89)   40  Screening for neurological condition (V80 09) (Z13 89)   41  Sigmoid diverticulitis (562 11) (K57 32)   42  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)   43  Tinea corporis (110 5) (B35 4)   44  Transient disorder of initiating or maintaining sleep (307 41) (F51 02)   45  Tricuspid valve disorders, 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)   46  Xerosis cutis (706 8) (L85 3)    Past Medical History    · History of Accidental Puncture / Laceration During A Procedure (998 2)   · Adenocarcinoma of prostate (185) (C61)   · History of Ankle pain, unspecified laterality   · History of Ankle pain, unspecified laterality   · History of Benign paroxysmal vertigo, unspecified laterality (386 11) (H81 10)   · History of Bilateral edema of lower extremity (782 3) (R60 0)   · 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)   · History of Encounter for removal of sutures (V58 32) (Z48 02)   · History of Hematochezia (578 1) (K92 1)   · History of acute bronchitis (V12 69) (Z87 09)   · History of backache (V13 59) (Z87 39)   · History of headache (V13 89) (O59 721)   · History of herpes zoster (V12 09) (Z86 19)   · History of seborrheic keratosis (V13 3) (Z87 2)   · History of Inflamed seborrheic keratosis (702 11) (L82 0)   · History of Neoplasm of bone (239 2) (D49 2)   · History of Paronychia of toe, unspecified laterality (681 11) (L03 039)   · History of Rheumatoid Arthritis With Atlantoaxial Subluxation (839 01)    The active problems and past medical history were reviewed and updated today        Surgical History    The surgical history was reviewed and updated today  Family History  Mother    · Family history of Stroke syndrome  Father    · Family history of hypertension (V17 49) (Z82 49)   · Family history of Stroke syndrome    The family history was reviewed and updated today  Social History    · Former smoker (S55 36) (E02 164)   · Never a smoker  The social history was reviewed and updated today  The social history was reviewed and is unchanged  Current Meds   1  Aspir-81 81 MG Oral Tablet Delayed Release; 1 every day; Therapy: 96XCZ1900 to (Last Rx:10Lmp8162) Ordered   2  Centrum Silver Oral Tablet; 1 every day; Therapy: 80QLJ4945 to  Requested for: 56Svk8781 Recorded   3  Cilostazol 100 MG Oral Tablet; TAKE 1 TABLET TWICE DAILY; Therapy: 05QVI5332 to Recorded   4  Feosol 325 (65 Fe) MG Oral Tablet; TAKE 1 TABLET TWICE DAILY; Therapy: (Recorded:36Tgs8745) to Recorded   5  Florastor 250 MG Oral Capsule; TAKE 1 CAPSULE  DAILY Recorded   6  Gabapentin 300 MG Oral Capsule; TAKE 1 CAPSULE TWICE DAILY; Therapy: 78YAY3311 to (Evaluate:07Jan2017); Last Rx:30Clq6611 Ordered   7  Losartan Potassium 100 MG Oral Tablet; 1 DAILY; Therapy: (Recorded:56Ler0570) to Recorded   8  Metoprolol Tartrate 50 MG Oral Tablet; take one tablet by mouth twice daily; Therapy: 70EJC9687 to (Evaluate:91Mgi4635)  Requested for: 11Aug2016; Last   Rx:99Ufl8210 Ordered   9  Simvastatin 40 MG Oral Tablet; TAKE ONE TABLET BY MOUTH ONCE DAILY; Therapy: 68Xdt3774 to (Evaluate:64Ujx6855)  Requested for: 11Aug2016; Last   Rx:95Alu1763 Ordered   10  Tolterodine Tartrate ER 2 MG Oral Capsule Extended Release 24 Hour; 1 po qday; Therapy: 11Aug2016 to (Last Rx:11Aug2016)  Requested for: 11Aug2016 Ordered   11  Vitamin C CAPS; 1 every day; Therapy: 12Jun2007 to  Requested for: 76Jfm5043 Recorded    The medication list was reviewed and updated today  Allergies    1   No Known Drug Allergies    Vitals  Signs    Systolic: 192  Diastolic: 80  Height: 5 ft 8 in  Weight: 165 lb   BMI Calculated: 25 09  BSA Calculated: 1 88    Physical Exam    Right Wrist: Appearance: Normal except ecchymosis, swelling and Small well-healed wound over the dorsum mid forearm  Tenderness: radial styloid process  ROM: Deferred  Motor: Normal    Constitutional - General appearance: Normal    Musculoskeletal - Gait and station: Normal  Digits and nails: Normal  Muscle strength/tone: Normal    Cardiovascular - Pulses: Normal  Examination of extremities for edema and/or varicosities: Normal    Skin - Skin and subcutaneous tissue: Normal    Neurologic - Sensation: Normal    Psychiatric - Orientation to person, place, and time: Normal  Mood and affect: Normal    Eyes   Conjunctiva and lids: Normal     Pupils and irises: Normal        Future Appointments    Date/Time Provider Specialty Site   05/15/2017 01:30 PM [address]   03/22/2017 10:00 AM IRIS Hernandez   Sports Medicine [facility]     Signatures   Electronically signed by : IRIS Barnes ; Mar  9 2017  4:05PM EST                       (Author)

## 2018-01-13 VITALS
BODY MASS INDEX: 25.01 KG/M2 | TEMPERATURE: 97.9 F | WEIGHT: 165 LBS | HEIGHT: 68 IN | SYSTOLIC BLOOD PRESSURE: 136 MMHG | RESPIRATION RATE: 16 BRPM | HEART RATE: 68 BPM | DIASTOLIC BLOOD PRESSURE: 62 MMHG

## 2018-01-13 NOTE — MISCELLANEOUS
Message   Recorded as Task   Date: 06/30/2017 10:40 AM, Created By: Ramya Frost   Task Name: Follow Up   Assigned To: Joe Doll   Regarding Patient: Otni Degree, Status: Active   Comment:    Kat Hassan - 30 Jun 2017 10:40 AM     TASK CREATED  Caller: Self; Other; [phone] (Home)  Son, Aracelis Velázquez is calling to get some information regarding prescription Simvastatin  He has a couple of questions regarding his Dad  Please call [phone]    Jef Arauz - 30 Jun 2017 11:22 AM     TASK EDITED  He is complaining of leg pain and weakness  His Son thinks the leg pain is from his simvatstatin  I did advise him to go off the simvastatin and see if his leg pains get any better  His Son feels he is getting weaker and believes it is from the medication    Margarite Joe Wynne - 30 Jun 2017 1:52 PM     TASK EDITED  ok        Signatures   Electronically signed by : Kriss Frank DO; Jun 30 2017  1:52PM EST                       (Author)

## 2018-01-14 VITALS
SYSTOLIC BLOOD PRESSURE: 126 MMHG | HEART RATE: 72 BPM | HEIGHT: 68 IN | DIASTOLIC BLOOD PRESSURE: 60 MMHG | BODY MASS INDEX: 24.25 KG/M2 | WEIGHT: 160 LBS | TEMPERATURE: 96.6 F | RESPIRATION RATE: 16 BRPM

## 2018-01-14 VITALS
HEIGHT: 68 IN | SYSTOLIC BLOOD PRESSURE: 116 MMHG | BODY MASS INDEX: 25.01 KG/M2 | RESPIRATION RATE: 20 BRPM | HEART RATE: 72 BPM | DIASTOLIC BLOOD PRESSURE: 60 MMHG | WEIGHT: 165 LBS | TEMPERATURE: 96.8 F

## 2018-01-14 VITALS
BODY MASS INDEX: 25.22 KG/M2 | WEIGHT: 166.38 LBS | DIASTOLIC BLOOD PRESSURE: 70 MMHG | RESPIRATION RATE: 16 BRPM | HEIGHT: 68 IN | HEART RATE: 68 BPM | TEMPERATURE: 97.2 F | SYSTOLIC BLOOD PRESSURE: 104 MMHG

## 2018-01-14 NOTE — RESULT NOTES
Verified Results  XR RIBS BILATERAL 3 VIEW 10EYB8625 12:00AM Marce Logan     Test Name Result Flag Reference   XR RIBS BILATERAL 3 VW (Report)     BILATERAL RIBS AND CHEST     INDICATION: Bilateral rib pain for 4 days  COMPARISON: None     VIEWS: PA chest and 4 coned down views of the ribs; 7 images     FINDINGS:     The cardiomediastinal silhouette is enlarged  A median sternotomy has been performed  The lungs are clear  No pleural effusions  There is no pneumothorax  No rib fractures are identified  IMPRESSION:     1  No active pulmonary disease  2  No evidence of rib fractures         Workstation performed: [id number]     Signed by:   Soto Fenton MD   11/18/16       Discussion/Summary   Xray of your ribs on both sides did not show anything abnormal   Dr Steven Mcbride

## 2018-01-15 NOTE — MISCELLANEOUS
Assessment    1  Benign essential hypertension (401 1) (I10)   2  CAD (coronary artery disease) (414 00) (I25 10)   3  History of GI diverticular bleed (V12 79) (Z87 19)   4  Other depression (311) (F32 89)    Plan  Benign essential hypertension    · Losartan Potassium 50 MG Oral Tablet   Dispense: 0 Days ; #:90 Tablet; Refill: 3; For: Benign essential hypertension; URI = N; Record; Last Updated By: Radha Caro; 6/29/2017 10:28:43 AM   · Follow-up visit in 1 month Evaluation and Treatment  Follow-up  Status: Hold For -  Scheduling  Requested for: 54QAL0288   Ordered; For: Benign essential hypertension; Ordered By: Radha Caro Performed:  Due: 61WZV5544  Benign essential hypertension, CAD (coronary artery disease), History of GI diverticular  bleed    · (1) CBC/PLT/DIFF; Status:Active; Requested CML:75AAN1410;    Perform:[facility]; YWS:54IOC1022; Ordered; For:Benign essential hypertension, CAD (coronary artery disease), History of GI diverticular bleed; Ordered By:Markus Doll;   · (1) COMPREHENSIVE METABOLIC PANEL; Status:Active; Requested YFJ:23HGI4866;    Perform:[facility]; WEE:27NRQ9069; Ordered; For:Benign essential hypertension, CAD (coronary artery disease), History of GI diverticular bleed; Ordered By:Joe Doll;   · (1) IRON SATURATION %, TIBC; Status:Active; Requested RJJ:28TSM8877;    Perform:[facility]; QXU:75LOQ8822; Ordered; For:Benign essential hypertension, CAD (coronary artery disease), History of GI diverticular bleed; Ordered By:Markus Doll;  Other depression    · Sertraline HCl - 25 MG Oral Tablet; Take 1 tablet daily   Rx By: Radha Caro; Dispense: 0 Days ; #:30 Tablet;  Refill: 1; For: Other depression; URI = N; Verified Transmission to [facility]; Last Updated By: System, [employer]; 6/29/2017 10:28:08 AM  PAD (peripheral artery disease)    · Cilostazol 100 MG Oral Tablet   Dispense: 0 Days ; #: Sufficient Tablet; Refill: 0; For: PAD (peripheral artery disease); URI = N; Record; Last Updated By: Yoni Patel; 6/29/2017 10:28:43 AM    Discussion/Summary  Counseling Documentation With Imm: The patient, patient's family was counseled regarding instructions for management, risk factor reductions, impressions, risks and benefits of treatment options  Chief Complaint  I spoke with Mr Rehana Llanos on 6/28/17 after he was discharged to home yesterday  He states that he is "pretty good" and he denies any rectal bleeding  He states that he is moving his bowels which are normal  He does not have an appetite but states that he is still eating  He is not able to review his medications with me since his wife organizes them and assists him with taking them daily  She is currently not home but will return home in 2 hours so I will call back later to review them with her  He is aware to call here at any time with any questions or concerns and to go to the ER if any chest pain, dyspnea, weakness, dizziness, palpitations, rectal bleeding, etc JMoyleLPN   Chief Complaint Free Text Note Form: pt present for a TCM        History of Present Illness  TCM Communication [facility]: The patient is being contacted for follow-up after hospitalization and 2d  Hospital records were reviewed  He was hospitalized at [facility]  The date of admission: 6/24/17, date of discharge: 6/27/17  Diagnosis: Lower GI Bleed  He was discharged to home  He scheduled a follow up appointment  Symptoms: no weakness, no dizziness and no headache  Counseling was provided to the patient  Topics counseled included instructions for management, risk factor reductions, patient and family education, home health agency benefits, activities of daily living and importance of compliance with treatment     Communication performed and completed by [facility] LPN 4/18/25   HPI: eating/drink ok but not much  no bleeding noted  had low bp  losartan 50mg bid from qd, but actually only been on 1x/d  not on iron currently, was on in past with last diverticular bleed      Review of Systems  Complete-Male:   Constitutional: no fever  Cardiovascular: no chest pain  Gastrointestinal: no abdominal pain  Active Problems    1  Actinic keratosis (702 0) (L57 0)   2  Adhesive capsulitis of right shoulder (726 0) (M75 01)   3  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 (780 93) (R41 3)   4  Allergic rhinitis (477 9) (J30 9)   5  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 99) (M12 9)   6  Benign colon polyp (211 3) (K63 5)   7  Benign essential hypertension (401 1) (I10)   8  BMI 26 0-26 9,adult (V85 22) (Z68 26)   9  Bowel incontinence (787 60) (R15 9)   10  CAD (coronary artery disease) (414 00) (I25 10)   11  Chronic back pain (724 5,338 29) (M54 9,G89 29)   12  Colon cancer screening (V76 51) (Z12 11)   13  Depression screening (V79 0) (Z13 89)   14  Diaphragmatic hernia (553 3) (K44 9)   15  Distal radius fracture, right (813 42) (S52 501A)   16  Gout (274 9) (M10 9)   17  Hemorrhoids (455 6) (K64 9)   18  Hyperlipidemia LDL goal <100 (272 4) (E78 5)   19  Idiopathic peripheral neuropathy (356 9) (G60 9)   20  Immunization due (V05 9) (Z23)   21  Iron deficiency anemia due to chronic blood loss (280 0) (D50 0)   22  Lumbar disc disease (722 93) (M51 9)   23  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)   24  Osteoarthritis (715 90) (M19 90)   25  Overactive bladder (596 51) (N32 81)   26  PAD (peripheral artery disease) (443 9) (I73 9)   27  Prediabetes (790 29) (R73 09)   28  Prostate cancer screening (V76 44) (Z12 5)   29  Rectal bleeding (569 3) (K62 5)   30  Rheumatoid arthritis (714 0) (M06 9)   31  Rib pain (786 50) (R07 81)   32  Screening for cardiovascular, respiratory, and genitourinary diseases    (V81 2,V81 4,V81 6) (Z13 6,Z13 83,Z13 89)   33  Screening for diabetes mellitus (DM) (V77 1) (Z13 1)   34  Screening for genitourinary condition (V81 6) (Z13 89)   35   Screening for neurological condition (V80 09) (Z13 89)   36  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)   37  Transient disorder of initiating or maintaining sleep (307 41) (F51 02)   38  Tricuspid valve disorders, 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)   39  Xerosis cutis (706 8) (L85 3)    Past Medical History    1  History of Abnormal blood chemistry (790 6) (R79 9)   2  History of Accidental Puncture / Laceration During A Procedure (998 2)   3  Adenocarcinoma of prostate (185) (C61)   4  History of Ankle pain, unspecified laterality   5  History of Ankle pain, unspecified laterality   6  History of Benign paroxysmal vertigo, unspecified laterality (386 11) (H81 10)   7  History of Bilateral edema of lower extremity (782 3) (R60 0)   8  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)   9  History of Encounter for removal of sutures (V58 32) (Z48 02)   10  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)   11  History of Hematochezia (578 1) (K92 1)   12  History of acute bronchitis (V12 69) (Z87 09)   13  History of backache (V13 59) (Z87 39)   14  History of degenerative disc disease (V13 59) (Z87 39)   15  History of gastrointestinal hemorrhage (V12 79) (Z87 19)   16  History of headache (V13 89) (Z87 898)   17  History of herpes zoster (V12 09) (Z86 19)   18  History of low back pain (V13 59) (Z87 39)   19  History of seborrheic keratosis (V13 3) (Z87 2)   20  History of tinea corporis (V12 09) (Z86 19)   21  History of Inflamed seborrheic keratosis (702 11) (L82 0)   22  History of Neoplasm of bone (239 2) (D49 2)   23  History of Organic impotence (607 84) (N52 9)   24  History of Pain, upper back (724 5) (M54 9)   25  History of Paronychia of toe, unspecified laterality (681 11) (L03 039)   26  History of Rheumatoid Arthritis With Atlantoaxial Subluxation (839 01)   27  History of Sigmoid diverticulitis (562 11) (K58 32)    Family History  Mother    1  Family history of Stroke syndrome  Father    2   Family history of hypertension (V17 49) (Z82 49)   3  Family history of Stroke syndrome  Family History Reviewed: The family history was reviewed and updated today  Social History    · Former smoker (B31 74) (T79 190)  Social History Reviewed: The social history was reviewed and updated today  Current Meds   1  Aspir-81 81 MG Oral Tablet Delayed Release; 1 every day; Therapy: 83QAJ9266 to (Last Rx:42Cui1850) Ordered   2  Centrum Silver Oral Tablet; 1 every day; Therapy: 53KXP0528 to  Requested for: 34BVP4207 Recorded   3  Cilostazol 100 MG Oral Tablet; TAKE 1 TABLET TWICE DAILY; Therapy: 45VUS1855 to Recorded   4  Gabapentin 300 MG Oral Capsule; TAKE ONE CAPSULE BY MOUTH TWICE DAILY; Therapy: 24BSS6913 to (Lafaye Fillers)  Requested for: 65CHW9111; Last   Rx:71Nlv8973 Ordered   5  Losartan Potassium 100 MG Oral Tablet; 1 DAILY; Therapy: (Recorded:78Yno6416) to Recorded   6  Metoprolol Tartrate 50 MG Oral Tablet; take one tablet by mouth twice daily; Therapy: 21PHE0095 to (Evaluate:61Bqq6504)  Requested for: 12QWK4310; Last   Rx:67Zkz6448 Ordered   7  Simvastatin 40 MG Oral Tablet; TAKE ONE TABLET BY MOUTH ONCE DAILY; Therapy: 63Zrz5462 to (Evaluate:93Tpe6008)  Requested for: 99LIK8436; Last   Rx:66Awi7389 Ordered   8  Tolterodine Tartrate ER 2 MG Oral Capsule Extended Release 24 Hour; 1 po qday; Therapy: 67Fex8766 to (Last Rx:08Krv4403)  Requested for: 64XBX1777 Ordered   9  Vitamin C CAPS; 1 every day; Therapy: 77EWB9332 to  Requested for: 43VDS7775 Recorded    Allergies    1  No Known Drug Allergies    Vitals  Signs   Recorded: 20IZN9075 09:46AM   Temperature: 98 F  Heart Rate: 68  Respiration: 16  Systolic: 491  Diastolic: 58  Height: 5 ft 8 in  Weight: 167 lb   BMI Calculated: 25 39  BSA Calculated: 1 89    Physical Exam    Constitutional   General appearance: No acute distress, well appearing and well nourished  Eyes   Conjunctiva and lids: No swelling, erythema, or discharge      Pupils and irises: Equal, round and reactive to light  Ears, Nose, Mouth, and Throat   External inspection of ears and nose: Normal     Otoscopic examination: Tympanic membrance translucent with normal light reflex  Canals patent without erythema  Nasal mucosa, septum, and turbinates: Normal without edema or erythema  Oropharynx: Normal with no erythema, edema, exudate or lesions  Pulmonary   Respiratory effort: No increased work of breathing or signs of respiratory distress  Auscultation of lungs: Clear to auscultation, equal breath sounds bilaterally, no wheezes, no rales, no rhonci  Cardiovascular   Auscultation of heart: Normal rate and rhythm, normal S1 and S2, without murmurs  Examination of extremities for edema and/or varicosities: Normal     Carotid pulses: Normal     Abdomen   Abdomen: Non-tender, no masses  Lymphatic   Palpation of lymph nodes in neck: No lymphadenopathy  Musculoskeletal   Gait and station: Normal   slow  Digits and nails: Normal without clubbing or cyanosis  Skin   Skin and subcutaneous tissue: Normal without rashes or lesions  Neurologic   Sensation: No sensory loss  Psychiatric   Mood and affect: Normal          Provider Comments  Provider Comments:   stop losartan  cont toprol  recheck 4wks  stop cilostazol  keep sbp 110-140  may need po iron if anemic on labs ordered then f/u labs after      Message   Recorded as Task   Date: 06/27/2017 04:12 PM, Created By: System   Task Name: [facility]   Assigned To:  Peterson Patel   Regarding Patient: Andrea Banuelos, Status: Active   Comment:    System - 27 Jun 2017 4:12 PM     Patient discharged from hospital   Patient Name: Saqib Meza  Patient YOB: 1927  Discharge Date: 6/27/2017  Facility: [facility] 27 Jun 2017 4:13 PM     TASK REASSIGNED: Previously Assigned To Mir Stauffer     Future Appointments    Date/Time Provider Specialty Site   08/15/2017 01:30 PM DO Krishna Alicea [address] [facility]     Signatures   Electronically signed by : Huber Castro, ; Jun 28 2017  9:51AM EST                       (Co-author)    Electronically signed by : Audie Perkins DO; Jun 29 2017 11:25PM EST                       (Author)

## 2018-01-15 NOTE — RESULT NOTES
Verified Results  VAS LOWER LIMB ARTERIAL DUPLEX, COMPLETE BILATERAL/GRAFTS [id number] 12:53PM Vic Mitchell Order Number: [id number]    - Patient Instructions: To schedule this appointment, please contact Central Scheduling at [phone]  Test Name Result Flag Reference   VAS LOWER LIMB ARTERIAL DUPLEX, COMPLETE BILATERAL/GRAFTS (Report)     [facility] REPORT   CLINICAL:   Indications: PVD, Unspecified [I73 9]  Risk Factors: The patient has history of Hypertension and Hyperlipidemia  He   has no history of Diabetes  The patient current Height (in) is 69 in, Weight   (lb) is 170 lb and BMI is 25 1  Operative History   Heart stents x2   Right Brachial Pressure: 151/ mmHg, Left Brachial Pressure: 162/ mmHg  FINDINGS:      Segment        Rig Left                        PSV PSV    Common Femoral Artery  93 127    Prox Profunda     101 108    Prox SFA        79  72    Mid SFA         73  83    Dist SFA        56  67    Proximal Pop         42    Distal Pop       58  47    Prox Post Tibial       48    Dist Post Tibial    17  24    Prox  Ant  Tibial    36       Dist  Ant  Tibial    53  32    Dist Peroneal      33  81             CONCLUSION:      Impression:   RIGHT LOWER LIMB:   This resting evaluation shows no evidence of significant lower extremity   arterial occlusive disease  Ankle/Brachial index: Abnormally high CHEL is suggestive of poorly compressible   tibial vessels  Prior the same  Metatarsal pressure of 111 mmHg  Prior 137 mmHg  Great toe pressure of 96 mmHg, within the healing range      LEFT LOWER LIMB:   This resting evaluation shows no evidence of significant lower extremity   arterial occlusive disease  Ankle/Brachial index: Abnormally high CHEL is suggestive of poorly compressible   tibial vessels  Prior the same  Metatarsal pressure of 106 mmHg  Prior 149 mmHg     Great toe pressure of 67 mmHg, within the healing range      Compared to previous study on 7/15/2015, there is no significant change in the   progression if the disease  Recommend repeat testing in 1year as per protocol unless otherwise indicated  SIGNATURE:   Electronically Signed by: Marisa Quintana on 2016-11-25 03:06:48 PM       Discussion/Summary   The vascular test of the LEG ARTERIES shows that they are no worse than in July 2015 (but also not much better either!)  A repeat test is suggested in one year  We can work on medication options that might help at your next visit    Dr Basim Fleming

## 2018-01-16 NOTE — RESULT NOTES
Discussion/Summary   Please keep your office visit as scheduled  Dr Monique Drake        Verified Results  (1) CBC/PLT/DIFF [id number] 07:39AM Sergio Bauman Order Number: [id number]     Test Name Result Flag Reference   WBC COUNT 7 33 Thousand/uL  4 31-10 16   RBC COUNT 4 05 Million/uL  3 88-5 62   HEMOGLOBIN 11 7 g/dL L 12 0-17 0   HEMATOCRIT 37 5 %  36 5-49 3   MCV 93 fL  82-98   MCH 28 9 pg  26 8-34 3   MCHC 31 2 g/dL L 31 4-37 4   RDW 16 9 % H 11 6-15 1   MPV 10 9 fL  8 9-12 7   PLATELET COUNT 488 Thousands/uL  149-390   nRBC AUTOMATED 0 /100 WBCs     NEUTROPHILS RELATIVE PERCENT 55 %  43-75   LYMPHOCYTES RELATIVE PERCENT 18 %  14-44   MONOCYTES RELATIVE PERCENT 14 % H 4-12   EOSINOPHILS RELATIVE PERCENT 9 % H 0-6   BASOPHILS RELATIVE PERCENT 4 % H 0-1   NEUTROPHILS ABSOLUTE COUNT 3 99 Thousands/? ??L  1 85-7 62   LYMPHOCYTES ABSOLUTE COUNT 1 31 Thousands/? ??L  0 60-4 47   MONOCYTES ABSOLUTE COUNT 1 05 Thousand/? ??L  0 17-1 22   EOSINOPHILS ABSOLUTE COUNT 0 68 Thousand/? ??L H 0 00-0 61   BASOPHILS ABSOLUTE COUNT 0 27 Thousands/? ??L H 0 00-0 10     (1) COMPREHENSIVE METABOLIC PANEL [id number] 89:57JC Yosiwilder Roland    Order Number: [id number]     Test Name Result Flag Reference   SODIUM 141 mmol/L  136-145   POTASSIUM 3 9 mmol/L  3 5-5 3   CHLORIDE 108 mmol/L  100-108   CARBON DIOXIDE 26 mmol/L  21-32   ANION GAP (CALC) 7 mmol/L  4-13   BLOOD UREA NITROGEN 26 mg/dL H 5-25   CREATININE 1 21 mg/dL  0 60-1 30   Standardized to IDMS reference method   CALCIUM 8 7 mg/dL  8 3-10 1   BILI, TOTAL 0 44 mg/dL  0 20-1 00   ALK PHOSPHATAS 52 U/L     ALT (SGPT) 20 U/L  12-78   Specimen collection should occur prior to Sulfasalazine and/or Sulfapyridine administration due to the potential for falsely depressed results  AST(SGOT) 16 U/L  5-45   Specimen collection should occur prior to Sulfasalazine administration due to the potential for falsely depressed results     ALBUMIN 3 1 g/dL L 3 5-5 0   TOTAL PROTEIN 7 1 g/dL  6 4-8 2   eGFR 52 ml/min/1 73sq m     National Kidney Disease Education Program recommendations are as follows:  GFR calculation is accurate only with a steady state creatinine  Chronic Kidney disease less than 60 ml/min/1 73 sq  meters  Kidney failure less than 15 ml/min/1 73 sq  meters  GLUCOSE FASTING 97 mg/dL  65-99   Specimen collection should occur prior to Sulfasalazine administration due to the potential for falsely depressed results  Specimen collection should occur prior to Sulfapyridine administration due to the potential for falsely elevated results  (1) IRON SATURATION %, TIBC [id number] 07:39AM Cathy Parker   TW Order Number: [id number]     Test Name Result Flag Reference   IRON SATURATION 16 %     TOTAL IRON BINDING CAPACITY 293 ug/dL  250-450   IRON 48 ug/dL L    Patients treated with metal-binding drugs (ie  Deferoxamine) may have depressed iron values  (1) LIPID PANEL FASTING W DIRECT LDL REFLEX [id number] 07:39AM Branden Primrose Order Number: [id number]     Test Name Result Flag Reference   CHOLESTEROL 144 mg/dL     LDL CHOLESTEROL CALCULATED 88 mg/dL  0-100   Triglyceride:        Normal <150 mg/dl   Borderline High 150-199 mg/dl   High 200-499 mg/dl   Very High >499 mg/dl      Cholesterol:       Desirable <200 mg/dl    Borderline High 200-239 mg/dl    High >239 mg/dl      HDL Cholesterol:       High>59 mg/dL    Low <41 mg/dL      HDL Cholesterol:       High>59 mg/dL    Low <41 mg/dL      This screening LDL is a calculated result  It does not have the accuracy of the Direct Measured LDL in the monitoring of patients with hyperlipidemia and/or statin therapy  Direct Measure LDL (JFF172) must be ordered separately in these patients  TRIGLYCERIDES 104 mg/dL  <=150   Specimen collection should occur prior to N-Acetylcysteine or Metamizole administration due to the potential for falsely depressed results  HDL,DIRECT 35 mg/dL L 40-60   Specimen collection should occur prior to Metamizole administration due to the potential for falsley depressed results

## 2018-01-16 NOTE — MISCELLANEOUS
Message  Message Free Text Note Form: daughter called to report fatigue w/o cp or sob or palpitations today  bp low SBP 90s with HR 77  no missed/extra doses of bp meds  drinking fair  advised hold losartan  monitor BP and pulse qid  hold metoprolol if SBP<110  has appt with Dr August Elizabeth this week  ER if dizzy/cp/syncope/sob        Signatures   Electronically signed by : Oliver Puente DO; Jun 23 2017  6:08PM EST                       (Author)

## 2018-01-17 NOTE — MISCELLANEOUS
Message   Recorded as Task   Date: 03/11/2016 10:24 AM, Created By: Maikol Loja   Task Name: Call Back   Assigned To: Joe Doll   Regarding Patient: Aunscar Cintron, Status: Active   CommentKyle Newman - 11 Mar 2016 10:24 AM     TASK CREATED  Caller: MARIA D; [phone]  DR Rea Fritz   Pts daughter is concerned and would like you to know that her father will not tell you he is confused and dizzy alot  Any instructions at the visit today should be written for patient because he will not remember     [facility] - 11 Mar 2016 10:27 AM     TASK REASSIGNED: Previously Assigned To Brennen Cervantes 42 - 11 Mar 2016 1:32 PM     TASK EDITED  noted        Signatures   Electronically signed by : Chata Cloud DO; Mar 11 2016  1:33PM EST                       (Author)

## 2018-01-17 NOTE — RESULT NOTES
Discussion/Summary   Blood count is normal/good  Your hemoglobin and iron levels are ok without taking any iron supplements  Cholesterol profile is normal/good  Sugar, kidneys, minerals and liver are normal       Dr Cassia Banegas        Verified Results  (1) CBC/PLT/DIFF [id number] 07:10AM [facility]     Test Name Result Flag Reference   WBC COUNT 6 40 Thousand/uL  4 31-10 16   RBC COUNT 4 04 Million/uL  3 88-5 62   HEMOGLOBIN 11 8 g/dL L 12 0-17 0   HEMATOCRIT 36 8 %  36 5-49 3   MCV 91 fL  82-98   MCH 29 2 pg  26 8-34 3   MCHC 32 1 g/dL  31 4-37 4   RDW 16 3 % H 11 6-15 1   MPV 10 8 fL  8 9-12 7   PLATELET COUNT 711 Thousands/uL  149-390   nRBC AUTOMATED 0 /100 WBCs     NEUTROPHILS RELATIVE PERCENT 53 %  43-75   LYMPHOCYTES RELATIVE PERCENT 22 %  14-44   MONOCYTES RELATIVE PERCENT 9 %  4-12   EOSINOPHILS RELATIVE PERCENT 10 % H 0-6   BASOPHILS RELATIVE PERCENT 6 % H 0-1   NEUTROPHILS ABSOLUTE COUNT 3 37 Thousands/? ??L  1 85-7 62   LYMPHOCYTES ABSOLUTE COUNT 1 43 Thousands/? ??L  0 60-4 47   MONOCYTES ABSOLUTE COUNT 0 55 Thousand/? ??L  0 17-1 22   EOSINOPHILS ABSOLUTE COUNT 0 63 Thousand/? ??L H 0 00-0 61   BASOPHILS ABSOLUTE COUNT 0 40 Thousands/? ??L H 0 00-0 10   This bloodwork is non-fasting  Please drink two glasses of water morning of  bloodwork       (1) LIPID PANEL FASTING W DIRECT LDL REFLEX [id number] 07:10AM [facility]     Test Name Result Flag Reference   CHOLESTEROL 115 mg/dL     LDL CHOLESTEROL CALCULATED 62 mg/dL  0-100   This is a fasting blood test  Water,black tea or black  coffee only after 9:00pm the night before test  Drink 2 glasses of water the morning of test         Triglyceride:         Normal              <150 mg/dl       Borderline High    150-199 mg/dl       High               200-499 mg/dl       Very High          >499 mg/dl  Cholesterol:         Desirable        <200 mg/dl      Borderline High  200-239 mg/dl      High             >239 mg/dl  HDL Cholesterol: High    >59 mg/dL      Low     <41 mg/dL  LDL Cholesterol:        Optimal          <100 mg/dl        Near Optimal     100-129 mg/dl        Above Optimal          Borderline High   130-159 mg/dl          High              160-189 mg/dl          Very High        >189 mg/dl  LDL CALCULATED:    This screening LDL is a calculated result  It does not have the accuracy of the Direct Measured LDL in the monitoring of patients with hyperlipidemia and/or statin therapy  Direct Measure LDL ([id number]) must be ordered separately in these patients  TRIGLYCERIDES 63 mg/dL  <=150   Specimen collection should occur prior to N-Acetylcysteine or Metamizole administration due to the potential for falsely depressed results  HDL,DIRECT 40 mg/dL  40-60   Specimen collection should occur prior to Metamizole administration due to the potential for falsely depressed results  (1) COMPREHENSIVE METABOLIC PANEL [id number] 14:99QK [facility] [place]     Test Name Result Flag Reference   SODIUM 144 mmol/L  136-145   POTASSIUM 3 7 mmol/L  3 5-5 3   CHLORIDE 113 mmol/L H 100-108   CARBON DIOXIDE 24 mmol/L  21-32   ANION GAP (CALC) 7 mmol/L  4-13   BLOOD UREA NITROGEN 24 mg/dL  5-25   CREATININE 1 23 mg/dL  0 60-1 30   Standardized to IDMS reference method   CALCIUM 8 9 mg/dL  8 3-10 1   BILI, TOTAL 0 53 mg/dL  0 20-1 00   ALK PHOSPHATAS 44 U/L L    ALT (SGPT) 23 U/L  12-78   AST(SGOT) 12 U/L  5-45   ALBUMIN 3 3 g/dL L 3 5-5 0   TOTAL PROTEIN 6 9 g/dL  6 4-8 2   eGFR Non-African American 55 4 ml/min/1 73sq m     National Kidney Disease Education Program recommendations are as follows:  GFR calculation is accurate only with a steady state creatinine  Chronic Kidney disease less than 60 ml/min/1 73 sq  meters  Kidney failure less than 15 ml/min/1 73 sq  meters     GLUCOSE FASTING 96 mg/dL  65-99     (1) [facility] [id number] 07:10AM [facility]     Test Name Result Flag Reference   IRON 63 ug/dL L    Patients treated with metal-binding drugs (ie  Deferoxamine) may have depressed iron values       (1) TIBC [id number] 07:10AM Ammon Chacko     Test Name Result Flag Reference   TOTAL IRON BINDING CAPACITY 302 ug/dL  250-450

## 2018-01-17 NOTE — RESULT NOTES
Verified Results  COLONOSCOPY [id number] 11:21AM Hoda Oreilly     Test Name Result Flag Reference   Colonoscopy 1/18/17        Summary / No summary entered :      No summary entered   Documents attached :      EPIC OP NOTE - Hoda Jurado: [id number] - Transcription      Encounter - Santos Brooks (Gastroenterology Adult) (Result      Document)

## 2018-01-17 NOTE — RESULT NOTES
Verified Results  (1) CBC/PLT/DIFF [id number] 10:30AM Cornelious Flirt     Test Name Result Flag Reference   WBC COUNT 7 70 Thousand/uL  4 80-10 80   RBC COUNT 4 06 Million/uL L 4 70-6 10   HEMOGLOBIN 12 1 g/dL L 14 0-18 0   HEMATOCRIT 36 4 % L 42 0-52 0   MCV 90 fL  82-98   MCH 29 9 pg  27 0-31 0   MCHC 33 3 g/dL  31 4-37 4   RDW 14 5 %  11 6-15 1   MPV 7 8 fL L 8 9-12 7   PLATELET COUNT 002 Thousands/uL  130-400     (1) IRON SATURATION %, TIBC [id number] 10:30AM Cornelious Flirt     Test Name Result Flag Reference   IRON SATURATION 21 %     TOTAL IRON BINDING CAPACITY 313 ug/dL  250-450   IRON 66 ug/dL     Patients treated with metal-binding drugs (ie  Deferoxamine) may have depressed iron values  Discussion/Summary   Blood count continues to improve and is essentially normal   Iron levels are good also    Dr Renée Orta

## 2018-01-18 NOTE — RESULT NOTES
Verified Results  * MRI LUMBAR SPINE WO CONTRAST [id number] 02:40PM Ashlyn      Test Name Result Flag Reference   MRI LUMBAR SPINE WO CONTRAST (Report)     This is a summary report  The complete report is available in the patient's medical record  If you cannot access the medical record, please contact the sending organization for a detailed fax or copy  MRI LUMBAR SPINE WITHOUT CONTRAST     INDICATION: Spinal stenosis  Bowel and bladder incontinence  Right-sided pain  COMPARISON: None  TECHNIQUE: Sagittal T1, sagittal T2, sagittal inversion recovery, axial T1 and axial T2, coronal T2       IMAGE QUALITY: Diagnostic     FINDINGS:     ALIGNMENT: Normal alignment of the lumbar spine  No compression fracture  No spondylolysis or spondylolisthesis  No scoliosis  MARROW SIGNAL: Normal marrow signal is identified within the visualized bony structures  No discrete marrow lesion  DISTAL CORD AND CONUS: Normal size and signal within the distal cord and conus  The conus ends at the L1 level  PARASPINAL SOFT TISSUES: Paraspinal soft tissues are unremarkable  SACRUM: Normal signal within the sacrum  No evidence of insufficiency or stress fracture  LOWER THORACIC DISC SPACES: Normal disc height and signal  No disc herniation, canal stenosis or foraminal narrowing  LUMBAR DISC SPACES:        L1-L2: Disc desiccation with minimal loss of disc height  No significant central or foraminal narrowing  L2-L3: There is mild annular bulging with marginal osteophytes and facet hypertrophy  Minimal central stenosis identified without foraminal narrowing  L3-L4: Degenerative disc osteophyte complex with marginal osteophytes eccentric to the left with moderate facet hypertrophy combined result in mild central stenosis, moderate left lateral recess stenosis, and moderate left foraminal narrowing  Lateral    osteophyte contacts the L3 foraminal nerve root  Correlate for left L3 radiculopathy  L4-L5: Severe facet hypertrophy identified  There is minimal anterolisthesis  Mild central and moderate left lateral recess stenosis noted  Mild left foraminal narrowing is noted  L5-S1: Moderate facet hypertrophy  Degenerative disc osteophyte complex results in mild bilateral foraminal narrowing  IMPRESSION:     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 results in moderate left foraminal narrowing at L3-4 with osteophyte contacting the foraminal left L3 nerve root  Mild to moderate central and foraminal narrowing also noted at remaining    levels as described  Workstation performed: [id number]     Signed by:   Michael Kowalski DO   12/2/16       Plan  Chronic back pain    · 1 - Cody JOSHI, Sven Baker  (Orthopedic Surgery) Physician Referral  Consult Only: the  expectation is that the referring provider will communicate back to the patient on  treatment options  Evaluation and Treatment: the expectation is that the referred to  provider will communicate back to the patient on treatment options    Status: Active   Requested for: [id number]  Care Summary provided  : Yes

## 2018-01-18 NOTE — MISCELLANEOUS
Message   Recorded as Task   Date: 07/03/2017 10:37 AM, Created By: Brianna Barba   Task Name: Follow Up   Assigned To: Joe Doll   Regarding Patient: Yanick Chaudhari, Status: Active   CommentEdrick Ca - 03 Jul 2017 10:37 AM     TASK CREATED  Caller: Self; Other; [phone] (Home)  [facility] is calling to inform Dr Laurence Washington that Low Rued      any questions please call [phone]    Beth - 77 Jul 2017 11:59 AM     TASK REASSIGNED: Previously Assigned To [id number] [facility]        Signatures   Electronically signed by : Adele Sorto DO; Jul 6 2017 10:01PM EST                       (Author)

## 2018-01-18 NOTE — PROGRESS NOTES
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 9)   3  Benign essential hypertension (401 1) (I10)   4  Idiopathic peripheral neuropathy (356 9) (G60 9)   5  CAD (coronary artery disease) (414 00) (I25 10)   6  Gout (274 9) (M10 9)   7  Body mass index (BMI) of 26 0-26 9 in adult (V85 22) (Z68 26)    Plan  Idiopathic peripheral neuropathy    · DULoxetine HCl - 30 MG Oral Capsule Delayed Release Particles   · Gabapentin 100 MG Oral Capsule; 1 Every Day At Bedtime    Discussion/Summary    Stop the duloxetiine since it seems not to be helpful  start Gabapentin at the low dose of 100mg at bedtime  you could increase it to 200mg in 1-2 weeks if its not helping but you are tolerating it, at bedtime  we can increase it further depending on how you feel     Chief Complaint  Here for one month f/u  er/cma  History of Present Illness  minimal benefit with cymbalta, was $$  feels off balance but even before the meds  not worse since starting meds  maybe shakey  htn/gout/chol/pad stable       Review of Systems    Constitutional: no fever and no chills  Cardiovascular: no chest pain  Respiratory: no shortness of breath  Preventive Quality 65 and Older: The patient currently has no urinary incontinence symptoms  Active Problems    1  Abnormal blood chemistry (790 6) (R79 9)   2  Actinic keratosis (702 0) (L57 0)   3  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 (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 93) (R41 3)   4  Allergic rhinitis (477 9) (J30 9)   5  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 99) (M12 9)   6  Benign colon polyp (211 3) (K63 5)   7  Benign essential hypertension (401 1) (I10)   8  CAD (coronary artery disease) (414 00) (I25 10)   9  Colon cancer screening (V76 51) (Z12 11)   10  Depression screening (V79 0) (Z13 89)   11  Diaphragmatic hernia (553 3) (K44 9)   12  Gout (274 9) (M10 9)   13  Hemorrhoids (455 6) (K64 9)   14  Hyperlipidemia LDL goal <100 (272 4) (E78 5)   15  Idiopathic peripheral neuropathy (356 9) (G60 9)   16  Immunization due (V05 9) (Z23)   17  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)   18  Organic impotence (607 84) (N52 9)   19  Osteoarthrosis Of Multiple Sites (715 98)   20  PAD (peripheral artery disease) (443 9) (I73 9)   21  Prediabetes (790 29) (R73 09)   22  Prostate cancer screening (V76 44) (Z12 5)   23  Rheumatoid arthritis (714 0) (M06 9)   24  Screening for diabetes mellitus (DM) (V77 1) (Z13 1)   25  Screening for genitourinary condition (V81 6) (Z13 89)   26  Screening for neurological condition (V80 09) (Z13 89)   27  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)   28  Tinea corporis (110 5) (B35 4)   29  Transient disorder of initiating or maintaining sleep (307 41) (F51 02)   30  Tricuspid valve disorders, 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)   31  Xerosis cutis (706 8) (L85 3)    Past Medical History    1  History of Accidental Puncture / Laceration During A Procedure (998 2)   2  Adenocarcinoma of prostate (185) (C61)   3  History of Ankle pain, unspecified laterality   4  History of Ankle pain, unspecified laterality   5  History of Benign paroxysmal vertigo, unspecified laterality (386 11) (H81 10)   6  History of Bilateral edema of lower extremity (782 3) (R60 0)   7  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)   8  History of Encounter for removal of sutures (V58 32) (Z48 02)   9  History of acute bronchitis (V12 69) (Z87 09)   10  History of backache (V13 59) (Z87 39)   11  History of headache (V13 89) (Z87 898)   12  History of herpes zoster (V12 09) (Z86 19)   13  History of seborrheic keratosis (V13 3) (Z87 2)   14  History of Inflamed seborrheic keratosis (702 11) (L82 0)   15  History of Neoplasm of bone (239 2) (D49 2)   16  History of Pain, upper back (724 5) (M54 9)   17  History of Paronychia of toe, unspecified laterality (681 11) (L03 039)   18   History of Rheumatoid Arthritis With Atlantoaxial Subluxation (839 01)    Family History    1  Family history of Stroke syndrome    2  Family history of hypertension (V17 49) (Z82 49)   3  Family history of Stroke syndrome    The family history was reviewed and updated today  Social History    · Former smoker (S17 72) (N51 960)   · Never a smoker  The social history was reviewed and is unchanged  Current Meds   1  Allopurinol 100 MG Oral Tablet; TAKE TWO TABLETS BY MOUTH EVERY DAY; Therapy: 62Rfy4305 to (Evaluate:25Apr2014)  Requested for: 77BKI9360; Last   Rx:23Nul6286 Ordered   2  Centrum Silver Oral Tablet; 1 every day; Therapy: 69YMK8204 to  Requested for: 80Prb1569 Recorded   3  Cilostazol 100 MG Oral Tablet; TAKE 1 TABLET TWICE DAILY; Therapy: 70EBY0684 to Recorded   4  Donepezil HCl - 5 MG Oral Tablet; TAKE 1 TABLET AT BEDTIME; Therapy: 98WPG5621 to (Evaluate:30Qin4862)  Requested for: 10RZJ9133; Last   Rx:31Mar2015 Ordered   5  DULoxetine HCl - 30 MG Oral Capsule Delayed Release Particles; TAKE 1 CAPSULE   DAILY; Therapy: 64QSZ5879 to (Evaluate:26Dag9503)  Requested for: 51OLN9771; Last   Rx:11Mar2016 Ordered   6  Losartan Potassium 50 MG Oral Tablet; 1 every day; Therapy: 82JNO9890 to (Last Rx:73Zuk7071)  Requested for: 31LWX7878 Ordered   7  Metoprolol Tartrate 50 MG Oral Tablet; take one tablet by mouth twice daily; Therapy: 52WCF5922 to (Evaluate:11Jun2016)  Requested for: 57HIG5917; Last   Rx:61Abk9134 Ordered   8  PredniSONE 2 5 MG Oral Tablet; 1 every day; Therapy: 83Fxe0484 to (Last Rx:96Btk5191)  Requested for: 32CLP5077 Ordered   9  Simvastatin 40 MG Oral Tablet; TAKE ONE TABLET BY MOUTH ONCE DAILY; Therapy: 11Ajp4577 to (Evaluate:21Mar2016)  Requested for: 33WHU9045; Last   Rx:27Mar2015 Ordered   10  Vitamin C CAPS; 1 every day; Therapy: 94XIY4030 to  Requested for: 49Jzk6409 Recorded    Allergies    1   No Known Drug Allergies    Vitals  Vital Signs [Data Includes: Current Encounter]    Recorded: 03Vwq9645 11:22AM Temperature 96 2 F   Heart Rate 80   Respiration 16   Systolic 309   Diastolic 68   Height 5 ft 8 in   Weight 171 lb    BMI Calculated 26   BSA Calculated 1 91     Physical Exam    Constitutional   General appearance: No acute distress, well appearing and well nourished  Eyes   Conjunctiva and lids: No swelling, erythema, or discharge  Pupils and irises: Equal, round and reactive to light  Ears, Nose, Mouth, and Throat   External inspection of ears and nose: Normal     Otoscopic examination: Tympanic membrance translucent with normal light reflex  Canals patent without erythema  Nasal mucosa, septum, and turbinates: Normal without edema or erythema  Oropharynx: Normal with no erythema, edema, exudate or lesions  Pulmonary   Respiratory effort: No increased work of breathing or signs of respiratory distress  Auscultation of lungs: Clear to auscultation, equal breath sounds bilaterally, no wheezes, no rales, no rhonci  Cardiovascular   Auscultation of heart: Normal rate and rhythm, normal S1 and S2, without murmurs  Examination of extremities for edema and/or varicosities: Normal     Carotid pulses: Normal     Abdomen   Abdomen: Non-tender, no masses  Lymphatic   Palpation of lymph nodes in neck: No lymphadenopathy  Musculoskeletal   Gait and station: Normal     Digits and nails: Normal without clubbing or cyanosis  Skin   Skin and subcutaneous tissue: Normal without rashes or lesions      Psychiatric   Mood and affect: Normal          Results/Data  Encounter Results   PHQ-2 Adult Depression Screening 11Apr2016 06:17PM [facility]     Test Name Result Flag Reference   PHQ-2 Adult Depression Score 0     Q1: 0, Q2: 0   PHQ-2 Adult Depression Screening Negative       *VB-Urinary Incontinence Screen (Dx V81 6 Screen for UI) 95Jcz7515 06:17PM [facility]     Test Name Result Flag Reference   Urinary Incontinence Assessment 33Eop5351       Falls Risk Assessment (Dx V80 09 Screen for Neurologic Disorder) 25Alw9066 06:16PM Craig Pugh     Test Name Result Flag Reference   Falls Risk      No falls in the past year       Provider Comments    htn/gout/chol/pad stable  cad stable  dementia stable      Future Appointments    Date/Time Provider Specialty Site   07/11/2016 10:45 AM Craig Pugh, [address] [facility]     Signatures   Electronically signed by : Sinai Marin DO;  Apr 11 2016  8:57PM EST                       (Author)

## 2018-01-22 VITALS
BODY MASS INDEX: 25.31 KG/M2 | TEMPERATURE: 98 F | SYSTOLIC BLOOD PRESSURE: 100 MMHG | HEIGHT: 68 IN | HEART RATE: 68 BPM | RESPIRATION RATE: 16 BRPM | DIASTOLIC BLOOD PRESSURE: 58 MMHG | WEIGHT: 167 LBS

## 2018-02-19 DIAGNOSIS — F03.90 DEMENTIA WITHOUT BEHAVIORAL DISTURBANCE, UNSPECIFIED DEMENTIA TYPE (HCC): Primary | Chronic | ICD-10-CM

## 2018-02-19 PROBLEM — M75.01 ADHESIVE CAPSULITIS OF RIGHT SHOULDER: Status: ACTIVE | Noted: 2017-04-12

## 2018-02-19 PROBLEM — D50.0 IRON DEFICIENCY ANEMIA DUE TO CHRONIC BLOOD LOSS: Status: ACTIVE | Noted: 2017-02-12

## 2018-02-19 PROBLEM — F32.A DEPRESSION: Status: ACTIVE | Noted: 2017-06-29

## 2018-02-19 RX ORDER — TOLTERODINE 2 MG/1
1 CAPSULE, EXTENDED RELEASE ORAL DAILY
COMMUNITY
Start: 2016-08-11 | End: 2018-04-17 | Stop reason: SDUPTHER

## 2018-02-19 RX ORDER — PREDNISONE 2.5 MG
TABLET ORAL
COMMUNITY
Start: 2017-06-29

## 2018-02-19 RX ORDER — SERTRALINE HYDROCHLORIDE 25 MG/1
TABLET, FILM COATED ORAL
Qty: 30 TABLET | Refills: 5 | Status: SHIPPED | OUTPATIENT
Start: 2018-02-19 | End: 2018-08-14 | Stop reason: SDUPTHER

## 2018-02-19 RX ORDER — GABAPENTIN 300 MG/1
1 CAPSULE ORAL 2 TIMES DAILY
COMMUNITY
Start: 2016-07-11 | End: 2018-04-17 | Stop reason: SDUPTHER

## 2018-02-19 RX ORDER — SERTRALINE HYDROCHLORIDE 25 MG/1
1 TABLET, FILM COATED ORAL DAILY
COMMUNITY
Start: 2017-06-29 | End: 2018-04-19

## 2018-02-19 RX ORDER — METOPROLOL TARTRATE 50 MG/1
1 TABLET, FILM COATED ORAL 2 TIMES DAILY
COMMUNITY
Start: 2012-05-15 | End: 2018-11-06 | Stop reason: SDUPTHER

## 2018-02-19 RX ORDER — SIMVASTATIN 40 MG
TABLET ORAL DAILY
COMMUNITY
Start: 2012-08-06 | End: 2019-05-11

## 2018-02-19 RX ORDER — MULTIVIT WITH MINERALS/LUTEIN
TABLET ORAL DAILY
COMMUNITY
Start: 2007-06-12

## 2018-02-19 RX ORDER — LOSARTAN POTASSIUM 50 MG/1
1.5 TABLET ORAL DAILY
COMMUNITY
Start: 2012-02-28 | End: 2018-12-20

## 2018-04-09 ENCOUNTER — TRANSCRIBE ORDERS (OUTPATIENT)
Dept: LAB | Facility: CLINIC | Age: 83
End: 2018-04-09

## 2018-04-09 ENCOUNTER — APPOINTMENT (OUTPATIENT)
Dept: LAB | Facility: CLINIC | Age: 83
End: 2018-04-09
Payer: MEDICARE

## 2018-04-09 DIAGNOSIS — M10.9 GOUT, UNSPECIFIED CAUSE, UNSPECIFIED CHRONICITY, UNSPECIFIED SITE: ICD-10-CM

## 2018-04-09 DIAGNOSIS — I10 ESSENTIAL HYPERTENSION, BENIGN: ICD-10-CM

## 2018-04-09 DIAGNOSIS — E78.5 HYPERLIPIDEMIA, UNSPECIFIED HYPERLIPIDEMIA TYPE: Primary | ICD-10-CM

## 2018-04-09 DIAGNOSIS — I25.10 DISEASE OF CARDIOVASCULAR SYSTEM: ICD-10-CM

## 2018-04-09 DIAGNOSIS — D50.0 BLOOD LOSS ANEMIA: ICD-10-CM

## 2018-04-09 LAB
ALBUMIN SERPL BCP-MCNC: 3.1 G/DL (ref 3.5–5)
ALP SERPL-CCNC: 46 U/L (ref 46–116)
ALT SERPL W P-5'-P-CCNC: 17 U/L (ref 12–78)
ANION GAP SERPL CALCULATED.3IONS-SCNC: 5 MMOL/L (ref 4–13)
AST SERPL W P-5'-P-CCNC: 14 U/L (ref 5–45)
BASOPHILS # BLD AUTO: 0.21 THOUSANDS/ΜL (ref 0–0.1)
BASOPHILS NFR BLD AUTO: 3 % (ref 0–1)
BILIRUB SERPL-MCNC: 0.53 MG/DL (ref 0.2–1)
BUN SERPL-MCNC: 25 MG/DL (ref 5–25)
CALCIUM SERPL-MCNC: 8.8 MG/DL
CHLORIDE SERPL-SCNC: 109 MMOL/L (ref 100–108)
CHOLEST SERPL-MCNC: 158 MG/DL (ref 50–200)
CO2 SERPL-SCNC: 28 MMOL/L (ref 21–32)
CREAT SERPL-MCNC: 1.29 MG/DL (ref 0.6–1.3)
EOSINOPHIL # BLD AUTO: 0.73 THOUSAND/ΜL (ref 0–0.61)
EOSINOPHIL NFR BLD AUTO: 10 % (ref 0–6)
ERYTHROCYTE [DISTWIDTH] IN BLOOD BY AUTOMATED COUNT: 16.4 % (ref 11.6–15.1)
GFR SERPL CREATININE-BSD FRML MDRD: 48 ML/MIN/1.73SQ M
GLUCOSE P FAST SERPL-MCNC: 87 MG/DL (ref 65–99)
HCT VFR BLD AUTO: 39.3 % (ref 36.5–49.3)
HDLC SERPL-MCNC: 33 MG/DL (ref 40–60)
HGB BLD-MCNC: 12.7 G/DL (ref 12–17)
LDLC SERPL CALC-MCNC: 100 MG/DL (ref 0–100)
LYMPHOCYTES # BLD AUTO: 1.52 THOUSANDS/ΜL (ref 0.6–4.47)
LYMPHOCYTES NFR BLD AUTO: 22 % (ref 14–44)
MCH RBC QN AUTO: 28.8 PG (ref 26.8–34.3)
MCHC RBC AUTO-ENTMCNC: 32.3 G/DL (ref 31.4–37.4)
MCV RBC AUTO: 89 FL (ref 82–98)
MONOCYTES # BLD AUTO: 0.53 THOUSAND/ΜL (ref 0.17–1.22)
MONOCYTES NFR BLD AUTO: 8 % (ref 4–12)
NEUTROPHILS # BLD AUTO: 4.01 THOUSANDS/ΜL (ref 1.85–7.62)
NEUTS SEG NFR BLD AUTO: 57 % (ref 43–75)
NONHDLC SERPL-MCNC: 125 MG/DL
NRBC BLD AUTO-RTO: 0 /100 WBCS
PLATELET # BLD AUTO: 276 THOUSANDS/UL (ref 149–390)
PMV BLD AUTO: 10.2 FL (ref 8.9–12.7)
POTASSIUM SERPL-SCNC: 4 MMOL/L (ref 3.5–5.3)
PROT SERPL-MCNC: 7.3 G/DL (ref 6.4–8.2)
RBC # BLD AUTO: 4.41 MILLION/UL (ref 3.88–5.62)
SODIUM SERPL-SCNC: 142 MMOL/L (ref 136–145)
TRIGL SERPL-MCNC: 125 MG/DL
URATE SERPL-MCNC: 6.7 MG/DL (ref 4.2–8)
WBC # BLD AUTO: 7.02 THOUSAND/UL (ref 4.31–10.16)

## 2018-04-09 PROCEDURE — 80053 COMPREHEN METABOLIC PANEL: CPT

## 2018-04-09 PROCEDURE — 84550 ASSAY OF BLOOD/URIC ACID: CPT

## 2018-04-09 PROCEDURE — 36415 COLL VENOUS BLD VENIPUNCTURE: CPT

## 2018-04-09 PROCEDURE — 80061 LIPID PANEL: CPT

## 2018-04-09 PROCEDURE — 85025 COMPLETE CBC W/AUTO DIFF WBC: CPT

## 2018-04-17 DIAGNOSIS — N32.81 OVERACTIVE BLADDER: ICD-10-CM

## 2018-04-17 DIAGNOSIS — G60.9 IDIOPATHIC PERIPHERAL NEUROPATHY: Primary | Chronic | ICD-10-CM

## 2018-04-17 RX ORDER — TOLTERODINE 2 MG/1
CAPSULE, EXTENDED RELEASE ORAL
Qty: 90 CAPSULE | Refills: 1 | Status: SHIPPED | OUTPATIENT
Start: 2018-04-17 | End: 2018-10-16 | Stop reason: SDUPTHER

## 2018-04-17 RX ORDER — GABAPENTIN 300 MG/1
CAPSULE ORAL
Qty: 180 CAPSULE | Refills: 1 | Status: SHIPPED | OUTPATIENT
Start: 2018-04-17 | End: 2018-11-06 | Stop reason: SDUPTHER

## 2018-04-19 ENCOUNTER — OFFICE VISIT (OUTPATIENT)
Dept: FAMILY MEDICINE CLINIC | Facility: CLINIC | Age: 83
End: 2018-04-19
Payer: MEDICARE

## 2018-04-19 VITALS
DIASTOLIC BLOOD PRESSURE: 70 MMHG | WEIGHT: 168 LBS | BODY MASS INDEX: 24.05 KG/M2 | HEART RATE: 60 BPM | RESPIRATION RATE: 20 BRPM | HEIGHT: 70 IN | TEMPERATURE: 97 F | SYSTOLIC BLOOD PRESSURE: 114 MMHG

## 2018-04-19 DIAGNOSIS — E78.5 HYPERLIPIDEMIA LDL GOAL <100: ICD-10-CM

## 2018-04-19 DIAGNOSIS — G60.9 IDIOPATHIC PERIPHERAL NEUROPATHY: Chronic | ICD-10-CM

## 2018-04-19 DIAGNOSIS — I73.9 PAD (PERIPHERAL ARTERY DISEASE) (HCC): ICD-10-CM

## 2018-04-19 DIAGNOSIS — M12.9 ARTHROPATHY OF MULTIPLE SITES: ICD-10-CM

## 2018-04-19 DIAGNOSIS — I10 HYPERTENSION, UNSPECIFIED TYPE: Primary | ICD-10-CM

## 2018-04-19 DIAGNOSIS — I25.10 CORONARY ARTERY DISEASE INVOLVING NATIVE CORONARY ARTERY OF NATIVE HEART WITHOUT ANGINA PECTORIS: ICD-10-CM

## 2018-04-19 PROBLEM — R10.9 ABDOMINAL PAIN: Status: RESOLVED | Noted: 2017-01-16 | Resolved: 2018-04-19

## 2018-04-19 PROBLEM — R11.0 NAUSEA: Status: RESOLVED | Noted: 2017-01-16 | Resolved: 2018-04-19

## 2018-04-19 PROBLEM — K57.92 DIVERTICULITIS: Status: RESOLVED | Noted: 2017-01-16 | Resolved: 2018-04-19

## 2018-04-19 PROBLEM — D62 ACUTE BLOOD LOSS ANEMIA: Status: RESOLVED | Noted: 2017-01-17 | Resolved: 2018-04-19

## 2018-04-19 PROBLEM — R79.89 ABNORMAL LFTS: Status: RESOLVED | Noted: 2017-06-27 | Resolved: 2018-04-19

## 2018-04-19 PROCEDURE — 99214 OFFICE O/P EST MOD 30 MIN: CPT | Performed by: FAMILY MEDICINE

## 2018-04-19 NOTE — PROGRESS NOTES
Assessment/Plan:    No problem-specific Assessment & Plan notes found for this encounter  Htn/gout/cad/pad stable  Still not sure if gabapentin helps at all       Diagnoses and all orders for this visit:    Idiopathic peripheral neuropathy    Hypertension, unspecified type    Coronary artery disease involving native coronary artery of native heart without angina pectoris    PAD (peripheral artery disease) (HCC)    Hyperlipidemia LDL goal <100    Arthropathy of multiple sites      Use of statins for the purposes of CVD/CVA risks reduction was advised according to USPSTF guidelines along with appropriate continued liver monitoring     q6m f/u    No Follow-up on file  Subjective:      Patient ID: Alvino Hauser is a 80 y o  male  Chief Complaint   Patient presents with    Results     Discuss recent lab work results [facility] He is not sure of what medications he takes  His son manages       HPI  Labs reviewed in detail  Tolerating meds  Lives with wife  Uses cane on long walks  No recent falls  No dizziness or cp    The following portions of the patient's history were reviewed and updated as appropriate: allergies, current medications, past family history, past medical history, past social history, past surgical history and problem list     Review of Systems   Cardiovascular: Negative for chest pain  Neurological: Negative for syncope           Current Outpatient Prescriptions   Medication Sig Dispense Refill    Ascorbic Acid (VITAMIN C) 500 MG CAPS Take by mouth daily      gabapentin (NEURONTIN) 300 mg capsule TAKE ONE CAPSULE BY MOUTH TWICE DAILY 180 capsule 1    Lactobacillus (PROBIATA PO) Take by mouth daily      losartan (COZAAR) 50 mg tablet Take 1 tablet by mouth daily      metoprolol tartrate (LOPRESSOR) 50 mg tablet Take 1 tablet by mouth 2 (two) times a day      Multiple Vitamins-Minerals (CENTRUM SILVER PO) Take by mouth daily      predniSONE 2 5 mg tablet Take by mouth      sertraline (ZOLOFT) 25 mg tablet TAKE ONE TABLET BY MOUTH ONCE DAILY 30 tablet 5    simvastatin (ZOCOR) 10 mg tablet Take by mouth daily      tolterodine (DETROL LA) 2 mg 24 hr capsule TAKE ONE CAPSULE BY MOUTH ONCE DAILY 90 capsule 1     No current facility-administered medications for this visit  Objective:    /70   Pulse 60   Temp (!) 97 °F (36 1 °C)   Resp 20   Ht 5' 10" (1 778 m)   Wt 76 2 kg (168 lb)   BMI 24 11 kg/m²        Physical Exam   Constitutional: He appears well-developed  HENT:   Head: Normocephalic  Eyes: Conjunctivae are normal    Neck: Neck supple  Cardiovascular: Normal rate and intact distal pulses  Pulmonary/Chest: Effort normal  No respiratory distress  Abdominal: Soft  Musculoskeletal: He exhibits no edema or deformity  Neurological: He is alert  Skin: Skin is warm and dry  Psychiatric: His behavior is normal  Thought content normal    Nursing note and vitals reviewed               Ady Crespo DO

## 2018-04-19 NOTE — PATIENT INSTRUCTIONS
Your labwork is good, we should continue checking it about every 6 months  Your blood pressure and cholesterol and liver/kidneys/sugar/blood count are also good while on the medications you take  Sertraline is a medication for depression which you should continue  Gabapentin is a pill for nerve pain/discomfort like in your feet/legs  If you are not sure that it is helping, you could stop it for abotu 1-2 weeks to see if you notice a difference and restart it to see if it actually does help  If it does not help, it can either be stopped entirely or increased carefully to a higher dose      Please do your next set of labwork about 1 week before your next 6 month follow up visit in October

## 2018-08-14 DIAGNOSIS — F03.90 DEMENTIA WITHOUT BEHAVIORAL DISTURBANCE, UNSPECIFIED DEMENTIA TYPE (HCC): Chronic | ICD-10-CM

## 2018-08-14 RX ORDER — SERTRALINE HYDROCHLORIDE 25 MG/1
TABLET, FILM COATED ORAL
Qty: 90 TABLET | Refills: 1 | Status: SHIPPED | OUTPATIENT
Start: 2018-08-14 | End: 2018-11-13 | Stop reason: SDUPTHER

## 2018-10-16 DIAGNOSIS — N32.81 OVERACTIVE BLADDER: ICD-10-CM

## 2018-10-16 RX ORDER — TOLTERODINE 2 MG/1
CAPSULE, EXTENDED RELEASE ORAL
Qty: 90 CAPSULE | Refills: 0 | Status: SHIPPED | OUTPATIENT
Start: 2018-10-16 | End: 2019-01-17 | Stop reason: SDUPTHER

## 2018-10-18 ENCOUNTER — APPOINTMENT (OUTPATIENT)
Dept: LAB | Facility: CLINIC | Age: 83
End: 2018-10-18
Payer: MEDICARE

## 2018-10-18 DIAGNOSIS — I25.10 CORONARY ARTERY DISEASE INVOLVING NATIVE CORONARY ARTERY OF NATIVE HEART WITHOUT ANGINA PECTORIS: ICD-10-CM

## 2018-10-18 DIAGNOSIS — G60.9 IDIOPATHIC PERIPHERAL NEUROPATHY: Chronic | ICD-10-CM

## 2018-10-18 DIAGNOSIS — I10 HYPERTENSION, UNSPECIFIED TYPE: ICD-10-CM

## 2018-10-18 LAB
ALBUMIN SERPL BCP-MCNC: 3.1 G/DL (ref 3.5–5)
ALP SERPL-CCNC: 47 U/L (ref 46–116)
ALT SERPL W P-5'-P-CCNC: 22 U/L (ref 12–78)
ANION GAP SERPL CALCULATED.3IONS-SCNC: 6 MMOL/L (ref 4–13)
AST SERPL W P-5'-P-CCNC: 14 U/L (ref 5–45)
BASOPHILS # BLD AUTO: 0.16 THOUSANDS/ΜL (ref 0–0.1)
BASOPHILS NFR BLD AUTO: 2 % (ref 0–1)
BILIRUB SERPL-MCNC: 0.47 MG/DL (ref 0.2–1)
BUN SERPL-MCNC: 19 MG/DL (ref 5–25)
CALCIUM SERPL-MCNC: 8.9 MG/DL (ref 8.3–10.1)
CHLORIDE SERPL-SCNC: 105 MMOL/L (ref 100–108)
CHOLEST SERPL-MCNC: 147 MG/DL (ref 50–200)
CO2 SERPL-SCNC: 28 MMOL/L (ref 21–32)
CREAT SERPL-MCNC: 1.09 MG/DL (ref 0.6–1.3)
EOSINOPHIL # BLD AUTO: 0.57 THOUSAND/ΜL (ref 0–0.61)
EOSINOPHIL NFR BLD AUTO: 9 % (ref 0–6)
ERYTHROCYTE [DISTWIDTH] IN BLOOD BY AUTOMATED COUNT: 16.3 % (ref 11.6–15.1)
GFR SERPL CREATININE-BSD FRML MDRD: 59 ML/MIN/1.73SQ M
GLUCOSE P FAST SERPL-MCNC: 90 MG/DL (ref 65–99)
HCT VFR BLD AUTO: 40.7 % (ref 36.5–49.3)
HDLC SERPL-MCNC: 30 MG/DL (ref 40–60)
HGB BLD-MCNC: 12.7 G/DL (ref 12–17)
IMM GRANULOCYTES # BLD AUTO: 0.02 THOUSAND/UL (ref 0–0.2)
IMM GRANULOCYTES NFR BLD AUTO: 0 % (ref 0–2)
LDLC SERPL CALC-MCNC: 94 MG/DL (ref 0–100)
LYMPHOCYTES # BLD AUTO: 1.33 THOUSANDS/ΜL (ref 0.6–4.47)
LYMPHOCYTES NFR BLD AUTO: 20 % (ref 14–44)
MCH RBC QN AUTO: 28.8 PG (ref 26.8–34.3)
MCHC RBC AUTO-ENTMCNC: 31.2 G/DL (ref 31.4–37.4)
MCV RBC AUTO: 92 FL (ref 82–98)
MONOCYTES # BLD AUTO: 0.79 THOUSAND/ΜL (ref 0.17–1.22)
MONOCYTES NFR BLD AUTO: 12 % (ref 4–12)
NEUTROPHILS # BLD AUTO: 3.67 THOUSANDS/ΜL (ref 1.85–7.62)
NEUTS SEG NFR BLD AUTO: 57 % (ref 43–75)
NRBC BLD AUTO-RTO: 0 /100 WBCS
PLATELET # BLD AUTO: 333 THOUSANDS/UL (ref 149–390)
PMV BLD AUTO: 10.5 FL (ref 8.9–12.7)
POTASSIUM SERPL-SCNC: 3.5 MMOL/L (ref 3.5–5.3)
PROT SERPL-MCNC: 7.1 G/DL (ref 6.4–8.2)
RBC # BLD AUTO: 4.41 MILLION/UL (ref 3.88–5.62)
SODIUM SERPL-SCNC: 139 MMOL/L (ref 136–145)
TRIGL SERPL-MCNC: 117 MG/DL
WBC # BLD AUTO: 6.54 THOUSAND/UL (ref 4.31–10.16)

## 2018-10-18 PROCEDURE — 80053 COMPREHEN METABOLIC PANEL: CPT

## 2018-10-18 PROCEDURE — 80061 LIPID PANEL: CPT

## 2018-10-18 PROCEDURE — 36415 COLL VENOUS BLD VENIPUNCTURE: CPT

## 2018-10-18 PROCEDURE — 85025 COMPLETE CBC W/AUTO DIFF WBC: CPT

## 2018-10-18 NOTE — TELEPHONE ENCOUNTER
Looks like patient went to the lab and got his blood work today   I will call him again later today or tomorrow to have him set follow up to review and do his 6 mo follow up

## 2018-10-26 ENCOUNTER — OFFICE VISIT (OUTPATIENT)
Dept: FAMILY MEDICINE CLINIC | Facility: CLINIC | Age: 83
End: 2018-10-26
Payer: MEDICARE

## 2018-10-26 VITALS
TEMPERATURE: 96.7 F | DIASTOLIC BLOOD PRESSURE: 68 MMHG | SYSTOLIC BLOOD PRESSURE: 116 MMHG | RESPIRATION RATE: 16 BRPM | HEART RATE: 64 BPM | WEIGHT: 164 LBS | HEIGHT: 70 IN | BODY MASS INDEX: 23.48 KG/M2

## 2018-10-26 DIAGNOSIS — G60.9 IDIOPATHIC PERIPHERAL NEUROPATHY: Chronic | ICD-10-CM

## 2018-10-26 DIAGNOSIS — I73.9 PAD (PERIPHERAL ARTERY DISEASE) (HCC): ICD-10-CM

## 2018-10-26 DIAGNOSIS — Z00.00 MEDICARE ANNUAL WELLNESS VISIT, SUBSEQUENT: Primary | ICD-10-CM

## 2018-10-26 DIAGNOSIS — I25.10 CORONARY ARTERY DISEASE INVOLVING NATIVE CORONARY ARTERY OF NATIVE HEART WITHOUT ANGINA PECTORIS: ICD-10-CM

## 2018-10-26 DIAGNOSIS — Z23 IMMUNIZATION DUE: ICD-10-CM

## 2018-10-26 DIAGNOSIS — E78.5 HYPERLIPIDEMIA LDL GOAL <100: ICD-10-CM

## 2018-10-26 DIAGNOSIS — I10 HYPERTENSION, UNSPECIFIED TYPE: ICD-10-CM

## 2018-10-26 PROCEDURE — 99214 OFFICE O/P EST MOD 30 MIN: CPT | Performed by: FAMILY MEDICINE

## 2018-10-26 PROCEDURE — 90662 IIV NO PRSV INCREASED AG IM: CPT

## 2018-10-26 PROCEDURE — G0008 ADMIN INFLUENZA VIRUS VAC: HCPCS

## 2018-10-26 PROCEDURE — G0439 PPPS, SUBSEQ VISIT: HCPCS | Performed by: FAMILY MEDICINE

## 2018-10-26 NOTE — PROGRESS NOTES
Assessment/Plan:    No problem-specific Assessment & Plan notes found for this encounter  Neuropathy unchanged, stop for 1-2w advised again to see if doing anything, could increase in the future    Htn/cad stable  Pad stable  Chol stable  Use of statins for the purposes of CVD/CVA risks reduction was advised according to USPSTF guidelines along with appropriate continued liver monitoring  Diagnoses and all orders for this visit:    Medicare annual wellness visit, subsequent    Idiopathic peripheral neuropathy    Hypertension, unspecified type  -     Comprehensive metabolic panel; Future    Coronary artery disease involving native coronary artery of native heart without angina pectoris    PAD (peripheral artery disease) ([facility] [address] )    Hyperlipidemia LDL goal <100    Immunization due  -     influenza vaccine, 0206-1148, high-dose, PF 0 5 mL, for patients 65 yr+ (FLUZONE HIGH-DOSE)              Return in about 6 months (around 4/26/2019) for Recheck  Subjective:      Patient ID: Hannah May is a 80 y o  male  Chief Complaint   Patient presents with    Follow-up     review blood work       HPI  Taking all meds  No dizziness or syncope  No myalgias on statin  Not depressed, feels ok on meds  Labs reviewed, low HDL, all rest normal    Never tried to stop gabapentin per last AVS printed  I reprinted and reviewed the plan  If no different without, could try higher doses if tolerated for neuropathy feet    Still forgetful  Gets lost sometimes    htn stable  No dizziness or cp    The following portions of the patient's history were reviewed and updated as appropriate: allergies, current medications, past family history, past medical history, past social history, past surgical history and problem list     Review of Systems   Constitutional: Negative for chills and fever  Respiratory: Negative for shortness of breath  Gastrointestinal: Negative for abdominal pain and bowel incontinence  Psychiatric/Behavioral: The patient is not nervous/anxious  Current Outpatient Prescriptions   Medication Sig Dispense Refill    Ascorbic Acid (VITAMIN C) 500 MG CAPS Take by mouth daily      gabapentin (NEURONTIN) 300 mg capsule TAKE ONE CAPSULE BY MOUTH TWICE DAILY 180 capsule 1    Lactobacillus (PROBIATA PO) Take by mouth daily      losartan (COZAAR) 50 mg tablet Take 1 tablet by mouth daily      metoprolol tartrate (LOPRESSOR) 50 mg tablet Take 1 tablet by mouth 2 (two) times a day      Multiple Vitamins-Minerals (CENTRUM SILVER PO) Take by mouth daily      predniSONE 2 5 mg tablet Take by mouth      sertraline (ZOLOFT) 25 mg tablet TAKE 1 TABLET BY MOUTH ONCE DAILY 90 tablet 1    simvastatin (ZOCOR) 10 mg tablet Take by mouth daily      tolterodine (DETROL LA) 2 mg 24 hr capsule TAKE 1 CAPSULE BY MOUTH ONCE DAILY 90 capsule 0     No current facility-administered medications for this visit  Objective:    /68   Pulse 64   Temp (!) 96 7 °F (35 9 °C)   Resp 16   Ht 5' 10" (1 778 m)   Wt 74 4 kg (164 lb)   BMI 23 53 kg/m²        Physical Exam   Constitutional: He appears well-developed  No distress  HENT:   Head: Normocephalic  Mouth/Throat: No oropharyngeal exudate  Eyes: Conjunctivae are normal  Right eye exhibits no discharge  Left eye exhibits no discharge  No scleral icterus  Neck: Neck supple  Carotid bruit is not present  Cardiovascular: Normal rate and intact distal pulses  No murmur heard  Pulmonary/Chest: Effort normal  No respiratory distress  Abdominal: Soft  He exhibits no distension  There is no tenderness  Musculoskeletal: He exhibits no edema, tenderness or deformity  Lymphadenopathy:     He has no cervical adenopathy  Neurological: He is alert  He exhibits normal muscle tone  Skin: Skin is warm and dry  No rash noted  No pallor  Psychiatric: His behavior is normal  Thought content normal    Nursing note and vitals reviewed  Richard Chahal,   Assessment and Plan:    Problem List Items Addressed This Visit     CAD (coronary artery disease)    Hyperlipidemia LDL goal <100    Hypertension    Relevant Orders    Comprehensive metabolic panel    Idiopathic peripheral neuropathy (Chronic)    Immunization due    Relevant Orders    influenza vaccine, [id number], high-dose, PF 0 5 mL, for patients 72 yr+ (FLUZONE HIGH-DOSE) (Completed)    PAD (peripheral artery disease) ([facility] [address] )      Other Visit Diagnoses     Medicare annual wellness visit, subsequent    -  Primary        Health Maintenance Due   Topic Date Due    DTaP,Tdap,and Td Vaccines (1 - Tdap) 08/12/2004    INFLUENZA VACCINE  07/01/2018         HPI:  Facundo Mandel is a 80 y o  male here for his Subsequent Wellness Visit      Patient Active Problem List   Diagnosis    Weakness    Hypertension    CAD (coronary artery disease)    PAD (peripheral artery disease) (HCC)    Hyperlipidemia LDL goal <100    Back pain    Dementia    Idiopathic peripheral neuropathy    Actinic keratosis    Adhesive capsulitis of right shoulder    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    Allergic rhinitis    Arthropathy of multiple sites    Benign colon polyp    Benign essential hypertension    Chronic back pain    Diaphragmatic hernia    Gout    Hemorrhoids    Iron deficiency anemia due to chronic blood loss    Lumbar disc disease    Mitral valve disorder    Depression    Overactive bladder    Prediabetes    Rheumatoid arthritis (HCC)    Tricuspid valve disorders, non-rheumatic    Immunization due     Past Medical History:   Diagnosis Date    Abnormal blood chemistry     resolved: 5/13/2017    Accidental puncture during procedure on organ 04/27/2011    accidental puncture/laceration during a procedure    Adenocarcinoma of prostate ([facility] [address] ) 11/02/2004    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    rheumatoid arthritis    Bilateral edema of lower extremity     last assessed-7/1/2015    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    Coronary artery disease     Dementia     Gait dyspraxia     last assessed-1/26/2017    Gastrointestinal hemorrhage     last assessed-2/15/2017    Hematochezia     resolved: 2/15/2017    Herpes zoster 12/07/2011    History of degenerative disc disease     resolved: 5/13/2017    Hyperlipidemia     Hypertension     Incontinence of feces     last assessed-11/17/2016    Inflamed seborrheic keratosis 11/30/2006    Neoplasm of bone 11/20/2007    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 [address] )     Sigmoid diverticulitis     last assessed-1/26/2017    Squamous cell carcinoma of cheek     resolved-7/30/2017    Tinea corporis 06/04/2008    Transient disorder of initiating or maintaining sleep 05/21/2010    Vertigo, benign paroxysmal, unspecified laterality 07/09/2012    Xerosis cutis     last assessed-8/6/2013     Past Surgical History:   Procedure Laterality Date    COLONOSCOPY N/A 1/19/2017    Procedure: COLONOSCOPY;  Surgeon: Florentin Capone MD;  Location: [place] [address] GI LAB; Service:     COLONOSCOPY N/A 1/18/2017    Procedure: COLONOSCOPY;  Surgeon: Florentin Capone MD;  Location: [place] [address] [facility] LAB; Service:     COLONOSCOPY N/A 6/26/2017    Procedure: COLONOSCOPY;  Surgeon: Coarzon Arevalo MD;  Location: [place] [address] [facility] LAB;   Service: Gastroenterology    CORONARY ARTERY BYPASS GRAFT       Family History   Problem Relation Age of Onset    Stroke Mother     Hypertension Father     Stroke Father      History   Smoking Status    Former Smoker    Packs/day: 1 00    Years: 30 00    Types: Cigarettes    Quit date: 1/20/2010   Smokeless Tobacco    Never Used     History   Alcohol Use No      History   Drug Use No       Current Outpatient Prescriptions   Medication Sig Dispense Refill    Ascorbic Acid (VITAMIN C) 500 MG CAPS Take by mouth daily      gabapentin (NEURONTIN) 300 mg capsule TAKE ONE CAPSULE BY MOUTH TWICE DAILY 180 capsule 1    Lactobacillus (PROBIATA PO) Take by mouth daily      losartan (COZAAR) 50 mg tablet Take 1 tablet by mouth daily      metoprolol tartrate (LOPRESSOR) 50 mg tablet Take 1 tablet by mouth 2 (two) times a day      Multiple Vitamins-Minerals (CENTRUM SILVER PO) Take by mouth daily      predniSONE 2 5 mg tablet Take by mouth      sertraline (ZOLOFT) 25 mg tablet TAKE 1 TABLET BY MOUTH ONCE DAILY 90 tablet 1    simvastatin (ZOCOR) 10 mg tablet Take by mouth daily      tolterodine (DETROL LA) 2 mg 24 hr capsule TAKE 1 CAPSULE BY MOUTH ONCE DAILY 90 capsule 0     No current facility-administered medications for this visit  No Known Allergies  Immunization History   Administered Date(s) Administered    Influenza Split High Dose Preservative Free IM 10/16/2012, 10/01/2013, 02/22/2016, 10/06/2017    Influenza TIV (IM) 11/07/2007, 10/17/2008, 10/02/2009, 09/15/2010    Influenza, Quadrivalent (nasal) 01/18/2017    Influenza, high dose seasonal 0 5 mL 10/26/2018    Pneumococcal Conjugate 13-Valent 02/22/2016    Pneumococcal Polysaccharide PPV23 10/17/2008, 01/18/2017    Td (adult), adsorbed 08/11/2004       Patient Care Team:  Marie Mcclain DO as PCP - General  MD Marie Ho DO Otto Alas, MD    Medicare Screening Tests and Risk Assessments:  Odellomar Rusty is here for his Subsequent Wellness visit  Last Medicare Wellness visit information reviewed, patient interviewed and updates made to the record today  Health Risk Assessment:  Patient rates overall health as good  Patient feels that their physical health rating is Same  Eyesight was rated as Same  Hearing was rated as Slightly worse  Patient feels that their emotional and mental health rating is Same   Pain experienced by patient in the last 7 days has been None  Patient states that he has experienced no weight loss or gain in last 6 months  Emotional/Mental Health:  Patient has been feeling nervous/anxious  PHQ-9 Depression Screening:    Frequency of the following problems over the past two weeks:      1  Little interest or pleasure in doing things: 0 - not at all      2  Feeling down, depressed, or hopeless: 0 - not at all  PHQ-2 Score: 0          Broken Bones/Falls: Fall Risk Assessment:    In the past year, patient has experienced: No history of falling in past year          Bladder/Bowel:  Patient has leaked urine accidently in the last six months  Patient reports no loss of bowel control  (Additional Comments: Hard to get to BR in time, uses cane)    Immunizations:  Patient has not had a flu vaccination within the last year  Patient has not received a pneumonia shot  Patient has not received a shingles shot  Patient has received tetanus/diphtheria shot  Home Safety:  Patient has trouble with stairs inside or outside of their home  Patient currently reports that there are no safety hazards present in home, working smoke alarms, no working carbon monoxide detectors  Preventative Screenings:   prostate cancer screen performed, colon cancer screen completed, cholesterol screen completed, glaucoma eye exam completed,     Nutrition:  Current diet: Regular and Low Saturated Fat with servings of the following:    Medications:  Patient is currently taking over-the-counter supplements  List of OTC medications includes: mvi  Patient is able to manage medications  Lifestyle Choices:  Patient has smoked or used tobacco in the past   Patient has stopped his tobacco use  Tobacco use quit date: 1960  Patient reports no alcohol use  Patient drives a vehicle  Patient wears seat belt  Current level of exercise of physical activity described by patient as: none          Activities of Daily Living:  Can get out of bed by his or her self, able to dress self, unable to make own meals, able to do own shopping, able to bathe self, can do own laundry/housekeeping, unable to manage own money and other related tasksAdditional Comments: Wife takes care of food and bills    Previous Hospitalizations:  No hospitalization or ED visit in past 12 months        Advanced Directives:  Patient has not decided on power of   Patient has not completed advanced directive

## 2018-11-06 DIAGNOSIS — G60.9 IDIOPATHIC PERIPHERAL NEUROPATHY: Chronic | ICD-10-CM

## 2018-11-06 DIAGNOSIS — I10 BENIGN ESSENTIAL HYPERTENSION: Primary | ICD-10-CM

## 2018-11-06 RX ORDER — GABAPENTIN 300 MG/1
CAPSULE ORAL
Qty: 180 CAPSULE | Refills: 1 | Status: ON HOLD | OUTPATIENT
Start: 2018-11-06 | End: 2019-05-15 | Stop reason: SDUPTHER

## 2018-11-06 RX ORDER — METOPROLOL TARTRATE 50 MG/1
TABLET, FILM COATED ORAL
Qty: 180 TABLET | Refills: 1 | Status: ON HOLD | OUTPATIENT
Start: 2018-11-06 | End: 2019-05-15 | Stop reason: SDUPTHER

## 2018-11-13 DIAGNOSIS — F03.90 DEMENTIA WITHOUT BEHAVIORAL DISTURBANCE, UNSPECIFIED DEMENTIA TYPE (HCC): Chronic | ICD-10-CM

## 2018-11-13 RX ORDER — SERTRALINE HYDROCHLORIDE 25 MG/1
TABLET, FILM COATED ORAL
Qty: 90 TABLET | Refills: 1 | Status: SHIPPED | OUTPATIENT
Start: 2018-11-13 | End: 2019-06-13

## 2018-11-29 ENCOUNTER — OFFICE VISIT (OUTPATIENT)
Dept: FAMILY MEDICINE CLINIC | Facility: CLINIC | Age: 83
End: 2018-11-29
Payer: MEDICARE

## 2018-11-29 VITALS
DIASTOLIC BLOOD PRESSURE: 70 MMHG | BODY MASS INDEX: 23.48 KG/M2 | WEIGHT: 164 LBS | RESPIRATION RATE: 16 BRPM | SYSTOLIC BLOOD PRESSURE: 100 MMHG | HEIGHT: 70 IN | HEART RATE: 60 BPM | TEMPERATURE: 96.3 F
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
## 2018-11-29 DIAGNOSIS — R82.90 ABNORMAL URINALYSIS: ICD-10-CM

## 2018-11-29 DIAGNOSIS — R53.1 GENERALIZED WEAKNESS: Primary | ICD-10-CM

## 2018-11-29 DIAGNOSIS — I10 BENIGN ESSENTIAL HYPERTENSION: ICD-10-CM

## 2018-11-29 DIAGNOSIS — I25.10 CORONARY ARTERY DISEASE INVOLVING NATIVE CORONARY ARTERY OF NATIVE HEART WITHOUT ANGINA PECTORIS: ICD-10-CM

## 2018-11-29 LAB
SL AMB  POCT GLUCOSE, UA: ABNORMAL
SL AMB LEUKOCYTE ESTERASE,UA: ABNORMAL
SL AMB POCT BILIRUBIN,UA: ABNORMAL
SL AMB POCT BLOOD,UA: ABNORMAL
SL AMB POCT CLARITY,UA: CLEAR
SL AMB POCT COLOR,UA: YELLOW
SL AMB POCT KETONES,UA: ABNORMAL
SL AMB POCT NITRITE,UA: ABNORMAL
SL AMB POCT PH,UA: 5
SL AMB POCT SPECIFIC GRAVITY,UA: 1.01
SL AMB POCT URINE PROTEIN: ABNORMAL
SL AMB POCT UROBILINOGEN: ABNORMAL

## 2018-11-29 PROCEDURE — 87086 URINE CULTURE/COLONY COUNT: CPT | Performed by: NURSE PRACTITIONER

## 2018-11-29 PROCEDURE — 99214 OFFICE O/P EST MOD 30 MIN: CPT | Performed by: NURSE PRACTITIONER

## 2018-11-29 PROCEDURE — 81003 URINALYSIS AUTO W/O SCOPE: CPT | Performed by: NURSE PRACTITIONER

## 2018-11-29 NOTE — PROGRESS NOTES
Subjective:      Patient ID: Beni Cohen is a 80 y o  male  Chief Complaint   Patient presents with    feeling weak     started 1 week ago       HPI  Patient accompanied with daughter  Stated that from a week feeling very weak and feeling like his legs giving up on him  Last week fell as not able to maintain weight on his legs but did not hit his head or any body part and no neuro changes  He is been good with cane usually but from last week been needing walker  Denies any chest pain, sob, headache, dizziness  Denies any urinary symptoms  As per daughter patient is currently taking losartan 25 mg daily and metoprolol 50 mg BID  Complaint with the medications  The following portions of the patient's history were reviewed and updated as appropriate: allergies, current medications, past family history, past medical history, past social history, past surgical history and problem list       Review of Systems   Respiratory: Negative  Cardiovascular: Negative  Gastrointestinal: Negative  Genitourinary: Negative  Musculoskeletal:        As noted in HPI     Neurological: Positive for weakness  Negative for dizziness, light-headedness, numbness and headaches  Psychiatric/Behavioral: Negative for agitation, confusion, decreased concentration and hallucinations  The patient is not nervous/anxious            Objective:    History   Smoking Status    Former Smoker    Packs/day: 1 00    Years: 30 00    Types: Cigarettes    Quit date: 1/20/1955   Smokeless Tobacco    Never Used       Allergies: No Known Allergies    Vitals:  /70   Pulse 60   Temp (!) 96 3 °F (35 7 °C)   Resp 16   Ht 5' 10" (1 778 m)   Wt 74 4 kg (164 lb)   BMI 23 53 kg/m²     Current Outpatient Prescriptions   Medication Sig Dispense Refill    Ascorbic Acid (VITAMIN C) 500 MG CAPS Take by mouth daily      gabapentin (NEURONTIN) 300 mg capsule TAKE 1 CAPSULE BY MOUTH TWICE DAILY 180 capsule 1    Lactobacillus (PROBIATA PO) Take by mouth daily      losartan (COZAAR) 50 mg tablet Take 1 tablet by mouth daily      metoprolol tartrate (LOPRESSOR) 50 mg tablet TAKE ONE TABLET BY MOUTH TWICE DAILY 180 tablet 1    Multiple Vitamins-Minerals (CENTRUM SILVER PO) Take by mouth daily      predniSONE 2 5 mg tablet Take by mouth      sertraline (ZOLOFT) 25 mg tablet TAKE 1 TABLET BY MOUTH ONCE DAILY 90 tablet 1    tolterodine (DETROL LA) 2 mg 24 hr capsule TAKE 1 CAPSULE BY MOUTH ONCE DAILY 90 capsule 0    simvastatin (ZOCOR) 10 mg tablet Take by mouth daily       No current facility-administered medications for this visit  Physical Exam   Constitutional: He is oriented to person, place, and time  He appears well-developed and well-nourished  Cardiovascular: Normal rate, regular rhythm and normal heart sounds  Pulmonary/Chest: Effort normal and breath sounds normal    Abdominal: Soft  Bowel sounds are normal  He exhibits no distension  There is no tenderness  Musculoskeletal: Normal range of motion  He exhibits no edema or tenderness  Neurological: He is alert and oriented to person, place, and time  Coordination normal    Generalized weakness   Skin: Skin is warm and dry  No rash noted  Psychiatric: He has a normal mood and affect  His behavior is normal          Recent Results (from the past 24 hour(s))   POCT urine dip auto non-scope    Collection Time: 11/29/18  4:36 PM   Result Value Ref Range     COLOR,UA Yellow     CLARITY,UA clear     SPECIFIC GRAVITY,UA 1 015      PH,UA 5     LEUKOCYTE ESTERASE,UA neg     NITRITE,UA neg     GLUCOSE, UA neg     KETONES,UA neg     BILIRUBIN,UA neg     BLOOD,UA neg     POCT URINE PROTEIN trace     SL AMB POCT UROBILINOGEN neg            Assessment/Plan:         Diagnoses and all orders for this visit:    Generalized weakness  Comments: Will start PT for strengthening  Orders:  -     Ambulatory referral to Physical Therapy;  Future  -     POCT urine dip auto non-scope  -     Urine culture    Fall, initial encounter  -     Ambulatory referral to Physical Therapy; Future  -     POCT urine dip auto non-scope  -     Urine culture    Abnormal urinalysis  Comments:  Protein noted in UA and will send urine culture  Orders:  -     POCT urine dip auto non-scope  -     Urine culture    Benign essential hypertension  Comments:   and will stop losartan and will follow up back in office 3 weeks or PRN    Coronary artery disease involving native coronary artery of native heart without angina pectoris  Comments:  will continue with metoprolol  BMI 23 0-23 9, adult            Patient Instructions: Will stop losartan  Will drink plenty of fluids  Supportive care discussed and advised  Follow up for no improvement and worsening of conditions  Patient advised and educated when to see immediate medical care  Will start PT  Will follow up with PCP in 3 to 4 weeks  Return in about 3 weeks (around 12/20/2018), or if symptoms worsen or fail to improve        MAZIN Zhao

## 2018-11-29 NOTE — PATIENT INSTRUCTIONS
Will stop losartan  Will drink plenty of fluids  Supportive care discussed and advised  Follow up for no improvement and worsening of conditions  Patient advised and educated when to see immediate medical care  Will start PT  Will follow up with PCP in 3 to 4 weeks

## 2018-12-01 LAB — BACTERIA UR CULT: NORMAL

## 2018-12-20 ENCOUNTER — OFFICE VISIT (OUTPATIENT)
Dept: FAMILY MEDICINE CLINIC | Facility: CLINIC | Age: 83
End: 2018-12-20
Payer: MEDICARE

## 2018-12-20 VITALS
BODY MASS INDEX: 23.91 KG/M2 | SYSTOLIC BLOOD PRESSURE: 126 MMHG | DIASTOLIC BLOOD PRESSURE: 60 MMHG | HEART RATE: 56 BPM | RESPIRATION RATE: 16 BRPM | HEIGHT: 70 IN | TEMPERATURE: 97.5 F | WEIGHT: 167 LBS

## 2018-12-20 DIAGNOSIS — F03.90 DEMENTIA WITHOUT BEHAVIORAL DISTURBANCE, UNSPECIFIED DEMENTIA TYPE (HCC): Chronic | ICD-10-CM

## 2018-12-20 DIAGNOSIS — R53.1 WEAKNESS: ICD-10-CM

## 2018-12-20 DIAGNOSIS — G60.9 IDIOPATHIC PERIPHERAL NEUROPATHY: Chronic | ICD-10-CM

## 2018-12-20 DIAGNOSIS — I10 HYPERTENSION, UNSPECIFIED TYPE: Primary | ICD-10-CM

## 2018-12-20 DIAGNOSIS — I10 BENIGN ESSENTIAL HYPERTENSION: ICD-10-CM

## 2018-12-20 DIAGNOSIS — F32.A DEPRESSION, UNSPECIFIED DEPRESSION TYPE: ICD-10-CM

## 2018-12-20 PROCEDURE — 99214 OFFICE O/P EST MOD 30 MIN: CPT | Performed by: FAMILY MEDICINE

## 2018-12-20 NOTE — PATIENT INSTRUCTIONS
Your blood pressure seems to be fine without the losartan pill so do not restart it  Stay on the metoprolol 50mg 2x/d for blood pressure control  You could still try to see if the gabapentin is helping you with foot pain by follow the written instructions I reprinted (from April)  Physical therapy could still be an option for you, if you want, to improve your strength and balance and to try to prevent falls  We can recheck you in 3 months as long as you are feeling ok

## 2018-12-20 NOTE — PROGRESS NOTES
Assessment/Plan:    No problem-specific Assessment & Plan notes found for this encounter      htn stable w/o losartan, cont toprol  Cad/chol stable, cont statin  Neuropathy unchanged, written instructions given again to try to reduce gabapentin to make sure it is doing something for his symptoms so as to avoid sedation/imbalance risks of medication  Depression stable  Dementia unchanged  PT suggested again, if willing, for gait stability     Diagnoses and all orders for this visit:    Hypertension, unspecified type    Weakness    Benign essential hypertension    Idiopathic peripheral neuropathy    Dementia without behavioral disturbance, unspecified dementia type    Depression, unspecified depression type        Return in about 3 months (around 3/20/2019) for Recheck  Subjective:      Patient ID: Tommy Dennis is a 80 y o  male  Chief Complaint   Patient presents with    Follow-up     patient present for 3 week follow up  af/rma        HPI  Was told to stop losartan  Denies weakness but about same as last visit  Using cane  Not lightheaded  No syncope  Did not go to PT, did not want to  Eats/drinks well  Denies depression or anxiety  Not sad      The following portions of the patient's history were reviewed and updated as appropriate: allergies, current medications, past family history, past medical history, past social history, past surgical history and problem list     Review of Systems   Respiratory: Negative for shortness of breath and wheezing  Cardiovascular: Negative for chest pain and leg swelling           Current Outpatient Prescriptions   Medication Sig Dispense Refill    Ascorbic Acid (VITAMIN C) 500 MG CAPS Take by mouth daily      gabapentin (NEURONTIN) 300 mg capsule TAKE 1 CAPSULE BY MOUTH TWICE DAILY 180 capsule 1    Lactobacillus (PROBIATA PO) Take by mouth daily      metoprolol tartrate (LOPRESSOR) 50 mg tablet TAKE ONE TABLET BY MOUTH TWICE DAILY 180 tablet 1    Multiple Vitamins-Minerals (CENTRUM SILVER PO) Take by mouth daily      predniSONE 2 5 mg tablet Take by mouth      simvastatin (ZOCOR) 40 mg tablet Take by mouth daily      tolterodine (DETROL LA) 2 mg 24 hr capsule TAKE 1 CAPSULE BY MOUTH ONCE DAILY 90 capsule 0    sertraline (ZOLOFT) 25 mg tablet TAKE 1 TABLET BY MOUTH ONCE DAILY 90 tablet 1     No current facility-administered medications for this visit  Objective:    /60   Pulse 56   Temp 97 5 °F (36 4 °C)   Resp 16   Ht 5' 10" (1 778 m)   Wt 75 8 kg (167 lb)   BMI 23 96 kg/m²        Physical Exam   Constitutional: He appears well-developed  No distress  HENT:   Head: Normocephalic  Mouth/Throat: No oropharyngeal exudate  Eyes: Conjunctivae are normal  Right eye exhibits no discharge  Left eye exhibits no discharge  No scleral icterus  Neck: Neck supple  Carotid bruit is not present  No thyromegaly present  Cardiovascular: Normal rate and intact distal pulses  Exam reveals no friction rub  No murmur heard  Pulmonary/Chest: Effort normal  No respiratory distress  He has no wheezes  Abdominal: Soft  He exhibits no distension  There is no tenderness  Musculoskeletal: He exhibits no edema or deformity  Lymphadenopathy:     He has no cervical adenopathy  Neurological: He is alert  He exhibits normal muscle tone  Skin: Skin is warm and dry  No rash noted  No pallor  Psychiatric: His behavior is normal  Thought content normal    Nursing note and vitals reviewed               Lito Bhatt DO

## 2019-01-09 ENCOUNTER — OFFICE VISIT (OUTPATIENT)
Dept: PODIATRY | Facility: CLINIC | Age: 84
End: 2019-01-09
Payer: MEDICARE

## 2019-01-09 VITALS
WEIGHT: 167 LBS | DIASTOLIC BLOOD PRESSURE: 74 MMHG | SYSTOLIC BLOOD PRESSURE: 139 MMHG | BODY MASS INDEX: 23.91 KG/M2 | HEIGHT: 70 IN | RESPIRATION RATE: 17 BRPM | HEART RATE: 67 BPM

## 2019-01-09 DIAGNOSIS — M79.672 PAIN IN BOTH FEET: Primary | ICD-10-CM

## 2019-01-09 DIAGNOSIS — M79.671 PAIN IN BOTH FEET: Primary | ICD-10-CM

## 2019-01-09 DIAGNOSIS — B35.3 TINEA PEDIS OF BOTH FEET: ICD-10-CM

## 2019-01-09 DIAGNOSIS — B35.1 ONYCHOMYCOSIS: ICD-10-CM

## 2019-01-09 DIAGNOSIS — I70.209 PERIPHERAL ARTERIOSCLEROSIS (HCC): ICD-10-CM

## 2019-01-09 DIAGNOSIS — L03.039 PARONYCHIA OF TOENAIL, UNSPECIFIED LATERALITY: ICD-10-CM

## 2019-01-09 PROCEDURE — 99202 OFFICE O/P NEW SF 15 MIN: CPT | Performed by: PODIATRIST

## 2019-01-09 NOTE — PROGRESS NOTES
Assessment/Plan:  Pain upon ambulation  Peripheral artery disease  Mycosis of nail and skin  Plan  Foot exam performed  Patient educated on condition  All nails debrided  We will add topical anti fungal agent  No problem-specific Assessment & Plan notes found for this encounter  There are no diagnoses linked to this encounter  Subjective:  Patient has pain in his feet and toes with ambulation  No history of trauma  He also has painful dry skin      Past Medical History:   Diagnosis Date    Abnormal blood chemistry     resolved: 5/13/2017    Accidental puncture during procedure on organ 04/27/2011    accidental puncture/laceration during a procedure    Adenocarcinoma of prostate ([facility] ) 11/02/2004    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    rheumatoid arthritis    Bilateral edema of lower extremity     last assessed-7/1/2015    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    Coronary artery disease     Dementia     Gait dyspraxia     last assessed-1/26/2017    Gastrointestinal hemorrhage     last assessed-2/15/2017    Hematochezia     resolved: 2/15/2017    Herpes zoster 12/07/2011    History of degenerative disc disease     resolved: 5/13/2017    Hyperlipidemia     Hypertension     Incontinence of feces     last assessed-11/17/2016    Inflamed seborrheic keratosis 11/30/2006    Neoplasm of bone 11/20/2007    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    Osteoarthritis     resolved: 10/1/2017    [facility]     Paronychia of toe     last assessed-8/6/2013-unspecified laterality    Rectal bleeding     resolved-7/30/2017    [facility] involving both hands [facility] )     Sigmoid diverticulitis     last assessed-1/26/2017    Squamous cell carcinoma of cheek     resolved-7/30/2017    Tinea corporis 06/04/2008    Transient disorder of initiating or maintaining sleep 05/21/2010    Vertigo, benign paroxysmal, unspecified laterality 07/09/2012    Xerosis sheryl     last assessed-8/6/2013       Past Surgical History:   Procedure Laterality Date    COLONOSCOPY N/A 1/19/2017    Procedure: COLONOSCOPY;  Surgeon: Ge Linares MD;  Location: [facility] GI LAB; Service:     COLONOSCOPY N/A 1/18/2017    Procedure: COLONOSCOPY;  Surgeon: Ge Linares MD;  Location: [facility]; Service:     COLONOSCOPY N/A 6/26/2017    Procedure: COLONOSCOPY;  Surgeon: Marda Dubin, MD;  Location: [facility] GI LAB;   Service: Gastroenterology    CORONARY ARTERY BYPASS GRAFT         No Known Allergies      Current Outpatient Prescriptions:     Ascorbic Acid (VITAMIN C) 500 MG CAPS, Take by mouth daily, Disp: , Rfl:     gabapentin (NEURONTIN) 300 mg capsule, TAKE 1 CAPSULE BY MOUTH TWICE DAILY, Disp: 180 capsule, Rfl: 1    Lactobacillus (PROBIATA PO), Take by mouth daily, Disp: , Rfl:     metoprolol tartrate (LOPRESSOR) 50 mg tablet, TAKE ONE TABLET BY MOUTH TWICE DAILY, Disp: 180 tablet, Rfl: 1    Multiple Vitamins-Minerals (CENTRUM SILVER PO), Take by mouth daily, Disp: , Rfl:     predniSONE 2 5 mg tablet, Take by mouth, Disp: , Rfl:     sertraline (ZOLOFT) 25 mg tablet, TAKE 1 TABLET BY MOUTH ONCE DAILY, Disp: 90 tablet, Rfl: 1    simvastatin (ZOCOR) 40 mg tablet, Take by mouth daily, Disp: , Rfl:     tolterodine (DETROL LA) 2 mg 24 hr capsule, TAKE 1 CAPSULE BY MOUTH ONCE DAILY, Disp: 90 capsule, Rfl: 0    Patient Active Problem List   Diagnosis    Weakness    CAD (coronary artery disease)    PAD (peripheral artery disease) (HCC)    Hyperlipidemia LDL goal <100    Back pain    Dementia    Idiopathic peripheral neuropathy    Actinic keratosis    Adhesive capsulitis of right shoulder    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    Allergic rhinitis    Arthropathy of multiple sites    Benign colon polyp    Benign essential hypertension    Chronic back pain    Diaphragmatic hernia    Gout    Hemorrhoids    Iron deficiency anemia due to chronic blood loss    Lumbar disc disease    Mitral valve disorder    Depression    Overactive bladder    Prediabetes    Rheumatoid arthritis (HCC)    Tricuspid valve disorders, non-rheumatic    Immunization due          Patient ID: Bruce Canales is a 80 y o  male  HPI    The following portions of the patient's history were reviewed and updated as appropriate: allergies, current medications, past family history, past medical history, past social history, past surgical history and problem list     Review of Systems      Objective:  Patient's shoes and socks removed  Foot Exam    General  General Appearance: appears stated age and healthy   Orientation: alert and oriented to person, place, and time   Affect: appropriate   Gait: antalgic       Right Foot/Ankle     Inspection and Palpation  Ecchymosis: none  Tenderness: metatarsals   Swelling: dorsum and metatarsals   Arch: pes planus  Hammertoes: fifth toe  Hallux valgus: no  Hallux limitus: yes  Skin Exam: callus, dry skin and tinea; Neurovascular  Dorsalis pedis: 1+  Posterior tibial: 1+  Superficial peroneal nerve sensation: diminished  Deep peroneal nerve sensation: diminished  Achilles reflex: 1+    Muscle Strength  Ankle dorsiflexion: 4    Tests  Too many toes: positive       Left Foot/Ankle      Inspection and Palpation  Tenderness: metatarsals   Swelling: dorsum and metatarsals   Arch: pes planus  Hammertoes: fifth toe  Hallux valgus: no  Hallux limitus: yes  Skin Exam: callus, dry skin and tinea; Neurovascular  Dorsalis pedis: 1+  Posterior tibial: 1+  Superficial peroneal nerve sensation: diminished  Deep peroneal nerve sensation: diminished  Achilles reflex: 1+    Muscle Strength  Ankle dorsiflexion: 4    Tests  Too many toes: positive         Physical Exam   Constitutional: He appears well-developed and well-nourished  Cardiovascular: Normal rate and regular rhythm  Pulses:       Dorsalis pedis pulses are 1+ on the right side, and 1+ on the left side          Posterior tibial pulses are 1+ on the right side, and 1+ on the left side  Feet:   Right Foot:   Skin Integrity: Positive for callus and dry skin  Left Foot:   Skin Integrity: Positive for callus and dry skin  Neurological:   Reflex Scores:       Achilles reflexes are 1+ on the right side and 1+ on the left side  Skin:   Severe profound mycotic toenail noted bilateral   There is lysis of nail  Significant debris noted  In addition there is significant moccasin tinea pedis bilateral   Nursing note and vitals reviewed

## 2019-01-17 DIAGNOSIS — N32.81 OVERACTIVE BLADDER: ICD-10-CM

## 2019-01-17 RX ORDER — TOLTERODINE 2 MG/1
CAPSULE, EXTENDED RELEASE ORAL
Qty: 90 CAPSULE | Refills: 1 | Status: SHIPPED | OUTPATIENT
Start: 2019-01-17

## 2019-02-14 ENCOUNTER — OFFICE VISIT (OUTPATIENT)
Dept: PODIATRY | Facility: CLINIC | Age: 84
End: 2019-02-14
Payer: MEDICARE

## 2019-02-14 VITALS
WEIGHT: 167 LBS | DIASTOLIC BLOOD PRESSURE: 76 MMHG | RESPIRATION RATE: 16 BRPM | HEIGHT: 70 IN | SYSTOLIC BLOOD PRESSURE: 142 MMHG | BODY MASS INDEX: 23.91 KG/M2

## 2019-02-14 DIAGNOSIS — M79.672 PAIN IN BOTH FEET: ICD-10-CM

## 2019-02-14 DIAGNOSIS — B35.1 ONYCHOMYCOSIS: ICD-10-CM

## 2019-02-14 DIAGNOSIS — M79.671 PAIN IN BOTH FEET: ICD-10-CM

## 2019-02-14 DIAGNOSIS — G60.9 IDIOPATHIC PERIPHERAL NEUROPATHY: Primary | Chronic | ICD-10-CM

## 2019-02-14 DIAGNOSIS — I70.209 PERIPHERAL ARTERIOSCLEROSIS (HCC): ICD-10-CM

## 2019-02-14 DIAGNOSIS — L03.039 PARONYCHIA OF TOENAIL, UNSPECIFIED LATERALITY: ICD-10-CM

## 2019-02-14 DIAGNOSIS — B35.3 TINEA PEDIS OF BOTH FEET: ICD-10-CM

## 2019-02-14 PROCEDURE — 99213 OFFICE O/P EST LOW 20 MIN: CPT | Performed by: PODIATRIST

## 2019-02-14 RX ORDER — GABAPENTIN 300 MG/1
300 CAPSULE ORAL 3 TIMES DAILY
Qty: 90 CAPSULE | Refills: 1 | Status: SHIPPED | OUTPATIENT
Start: 2019-02-14 | End: 2019-04-26 | Stop reason: SDUPTHER

## 2019-02-14 NOTE — PROGRESS NOTES
Procedures   Foot Exam     Assessment/Plan:  Pain upon ambulation  Peripheral artery disease  Mycosis of nail and skin  Neuropathy  Rule out radiculopathy      Plan  Foot exam performed  Patient educated on condition  All nails debrided  We will add topical anti fungal agent  We will increase gabapentin dosing     No problem-specific Assessment & Plan notes found for this encounter          There are no diagnoses linked to this encounter        Subjective:  Patient has pain in his feet and toes with ambulation  No history of trauma    He also has painful dry skin           Past Medical History:   Diagnosis Date    Abnormal blood chemistry       resolved: 5/13/2017    Accidental puncture during procedure on organ 04/27/2011     accidental puncture/laceration during a procedure    [facility] ([facility] ) 11/02/2004    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     rheumatoid arthritis    Bilateral edema of lower extremity       last assessed-7/1/2015    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    Coronary artery disease      Dementia      Gait dyspraxia       last assessed-1/26/2017    Gastrointestinal hemorrhage       last assessed-2/15/2017    Hematochezia       resolved: 2/15/2017    Herpes zoster 12/07/2011    History of degenerative disc disease       resolved: 5/13/2017    Hyperlipidemia      Hypertension      Incontinence of feces       last assessed-11/17/2016    Inflamed seborrheic keratosis 11/30/2006    Neoplasm of bone 11/20/2007    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    Osteoarthritis       resolved: 10/1/2017    [facility]      Paronychia of toe       last assessed-8/6/2013-unspecified laterality    Rectal bleeding       resolved-7/30/2017    Rheumatoid arthritis involving both hands ([facility])      Sigmoid diverticulitis       last assessed-1/26/2017    Squamous cell carcinoma of cheek       resolved-7/30/2017    Tinea corporis 06/04/2008    Transient disorder of initiating or maintaining sleep 05/21/2010    Vertigo, benign paroxysmal, unspecified laterality 07/09/2012    Xerosis cutis       last assessed-8/6/2013               Past Surgical History:   Procedure Laterality Date    COLONOSCOPY N/A 1/19/2017     Procedure: COLONOSCOPY;  Surgeon: Elan Olsen MD;  Location: [place] [address] GI LAB; Service:     COLONOSCOPY N/A 1/18/2017     Procedure: COLONOSCOPY;  Surgeon: Elan Olsen MD;  Location: [place] [address] GI LAB; Service:     COLONOSCOPY N/A 6/26/2017     Procedure: COLONOSCOPY;  Surgeon: Nazia Randolph MD;  Location: [place] [address] GI LAB;   Service: Gastroenterology    CORONARY ARTERY BYPASS GRAFT             No Known Allergies        Current Outpatient Prescriptions:     Ascorbic Acid (VITAMIN C) 500 MG CAPS, Take by mouth daily, Disp: , Rfl:     gabapentin (NEURONTIN) 300 mg capsule, TAKE 1 CAPSULE BY MOUTH TWICE DAILY, Disp: 180 capsule, Rfl: 1    Lactobacillus (PROBIATA PO), Take by mouth daily, Disp: , Rfl:     metoprolol tartrate (LOPRESSOR) 50 mg tablet, TAKE ONE TABLET BY MOUTH TWICE DAILY, Disp: 180 tablet, Rfl: 1    Multiple Vitamins-Minerals (CENTRUM SILVER PO), Take by mouth daily, Disp: , Rfl:     predniSONE 2 5 mg tablet, Take by mouth, Disp: , Rfl:     sertraline (ZOLOFT) 25 mg tablet, TAKE 1 TABLET BY MOUTH ONCE DAILY, Disp: 90 tablet, Rfl: 1    simvastatin (ZOCOR) 40 mg tablet, Take by mouth daily, Disp: , Rfl:     tolterodine (DETROL LA) 2 mg 24 hr capsule, TAKE 1 CAPSULE BY MOUTH ONCE DAILY, Disp: 90 capsule, Rfl: 0         Patient Active Problem List   Diagnosis    Weakness    CAD (coronary artery disease)    PAD (peripheral artery disease) (HCC)    Hyperlipidemia LDL goal <100    Back pain    Dementia    Idiopathic peripheral neuropathy    Actinic keratosis    Adhesive capsulitis of right shoulder    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    Allergic rhinitis    Arthropathy of multiple sites    Benign colon polyp    Benign essential hypertension    Chronic back pain    Diaphragmatic hernia    Gout    Hemorrhoids    Iron deficiency anemia due to chronic blood loss    Lumbar disc disease    Mitral valve disorder    Depression    Overactive bladder    Prediabetes    Rheumatoid arthritis (HCC)    Tricuspid valve disorders, non-rheumatic    Immunization due             Patient ID: Calos Terrell is a 80 y o  male      HPI     The following portions of the patient's history were reviewed and updated as appropriate: allergies, current medications, past family history, past medical history, past social history, past surgical history and problem list      Review of Systems       Objective:  Patient's shoes and socks removed     Foot Exam     General  General Appearance: appears stated age and healthy   Orientation: alert and oriented to person, place, and time   Affect: appropriate   Gait: antalgic         Right Foot/Ankle      Inspection and Palpation  Ecchymosis: none  Tenderness: metatarsals   Swelling: dorsum and metatarsals   Arch: pes planus  Hammertoes: fifth toe  Hallux valgus: no  Hallux limitus: yes  Skin Exam: callus, dry skin and tinea;      Neurovascular  Dorsalis pedis: 1+  Posterior tibial: 1+  Superficial peroneal nerve sensation: diminished  Deep peroneal nerve sensation: diminished  Achilles reflex: 1+     Muscle Strength  Ankle dorsiflexion: 4     Tests  Too many toes: positive         Left Foot/Ankle       Inspection and Palpation  Tenderness: metatarsals   Swelling: dorsum and metatarsals   Arch: pes planus  Hammertoes: fifth toe  Hallux valgus: no  Hallux limitus: yes  Skin Exam: callus, dry skin and tinea;      Neurovascular  Dorsalis pedis: 1+  Posterior tibial: 1+  Superficial peroneal nerve sensation: diminished  Deep peroneal nerve sensation: diminished  Achilles reflex: 1+     Muscle Strength  Ankle dorsiflexion: 4     Tests  Too many toes: positive            Physical Exam   Constitutional: He appears well-developed and well-nourished  Cardiovascular: Normal rate and regular rhythm  Pulses:       Dorsalis pedis pulses are 1+ on the right side, and 1+ on the left side  Posterior tibial pulses are 1+ on the right side, and 1+ on the left side  Feet:   Right Foot:   Skin Integrity: Positive for callus and dry skin  Left Foot:   Skin Integrity: Positive for callus and dry skin  Neurological:   Reflex Scores:       Achilles reflexes are 1+ on the right side and 1+ on the left side  Skin:   Severe profound mycotic toenail noted bilateral   There is lysis of nail  Significant debris noted    In addition there is significant moccasin tinea pedis bilateral   Nursing note and vitals reviewed

## 2019-04-18 ENCOUNTER — OFFICE VISIT (OUTPATIENT)
Dept: PODIATRY | Facility: CLINIC | Age: 84
End: 2019-04-18
Payer: MEDICARE

## 2019-04-18 VITALS
DIASTOLIC BLOOD PRESSURE: 72 MMHG | BODY MASS INDEX: 23.91 KG/M2 | SYSTOLIC BLOOD PRESSURE: 177 MMHG | WEIGHT: 167 LBS | HEIGHT: 70 IN

## 2019-04-18 DIAGNOSIS — B35.3 TINEA PEDIS OF BOTH FEET: ICD-10-CM

## 2019-04-18 DIAGNOSIS — I70.209 PERIPHERAL ARTERIOSCLEROSIS (HCC): ICD-10-CM

## 2019-04-18 DIAGNOSIS — B35.1 ONYCHOMYCOSIS: ICD-10-CM

## 2019-04-18 DIAGNOSIS — G60.9 IDIOPATHIC PERIPHERAL NEUROPATHY: Primary | Chronic | ICD-10-CM

## 2019-04-18 DIAGNOSIS — L03.039 PARONYCHIA OF TOENAIL, UNSPECIFIED LATERALITY: ICD-10-CM

## 2019-04-18 PROCEDURE — 99213 OFFICE O/P EST LOW 20 MIN: CPT | Performed by: PODIATRIST

## 2019-04-26 ENCOUNTER — OFFICE VISIT (OUTPATIENT)
Dept: FAMILY MEDICINE CLINIC | Facility: CLINIC | Age: 84
End: 2019-04-26
Payer: MEDICARE

## 2019-04-26 VITALS
HEIGHT: 70 IN | WEIGHT: 164 LBS | SYSTOLIC BLOOD PRESSURE: 130 MMHG | RESPIRATION RATE: 16 BRPM | DIASTOLIC BLOOD PRESSURE: 70 MMHG | TEMPERATURE: 96.5 F | HEART RATE: 72 BPM | BODY MASS INDEX: 23.48 KG/M2

## 2019-04-26 DIAGNOSIS — I73.9 PAD (PERIPHERAL ARTERY DISEASE) (HCC): ICD-10-CM

## 2019-04-26 DIAGNOSIS — I10 BENIGN ESSENTIAL HYPERTENSION: ICD-10-CM

## 2019-04-26 DIAGNOSIS — F03.90 DEMENTIA WITHOUT BEHAVIORAL DISTURBANCE, UNSPECIFIED DEMENTIA TYPE (HCC): Primary | Chronic | ICD-10-CM

## 2019-04-26 DIAGNOSIS — M06.9 RHEUMATOID ARTHRITIS, INVOLVING UNSPECIFIED SITE, UNSPECIFIED RHEUMATOID FACTOR PRESENCE: ICD-10-CM

## 2019-04-26 DIAGNOSIS — J30.89 NON-SEASONAL ALLERGIC RHINITIS, UNSPECIFIED TRIGGER: ICD-10-CM

## 2019-04-26 DIAGNOSIS — I25.10 CORONARY ARTERY DISEASE INVOLVING NATIVE CORONARY ARTERY OF NATIVE HEART WITHOUT ANGINA PECTORIS: ICD-10-CM

## 2019-04-26 PROCEDURE — 99214 OFFICE O/P EST MOD 30 MIN: CPT | Performed by: FAMILY MEDICINE

## 2019-04-30 ENCOUNTER — APPOINTMENT (OUTPATIENT)
Dept: LAB | Age: 84
End: 2019-04-30
Payer: MEDICARE

## 2019-04-30 DIAGNOSIS — I25.10 CORONARY ARTERY DISEASE INVOLVING NATIVE CORONARY ARTERY OF NATIVE HEART WITHOUT ANGINA PECTORIS: ICD-10-CM

## 2019-04-30 DIAGNOSIS — I10 BENIGN ESSENTIAL HYPERTENSION: ICD-10-CM

## 2019-04-30 LAB
ALBUMIN SERPL BCP-MCNC: 3.3 G/DL (ref 3.5–5)
ALP SERPL-CCNC: 61 U/L (ref 46–116)
ALT SERPL W P-5'-P-CCNC: 17 U/L (ref 12–78)
ANION GAP SERPL CALCULATED.3IONS-SCNC: 5 MMOL/L (ref 4–13)
AST SERPL W P-5'-P-CCNC: 12 U/L (ref 5–45)
BILIRUB SERPL-MCNC: 0.57 MG/DL (ref 0.2–1)
BUN SERPL-MCNC: 24 MG/DL (ref 5–25)
CALCIUM SERPL-MCNC: 8.8 MG/DL (ref 8.3–10.1)
CHLORIDE SERPL-SCNC: 106 MMOL/L (ref 100–108)
CO2 SERPL-SCNC: 26 MMOL/L (ref 21–32)
CREAT SERPL-MCNC: 1.19 MG/DL (ref 0.6–1.3)
GFR SERPL CREATININE-BSD FRML MDRD: 53 ML/MIN/1.73SQ M
GLUCOSE P FAST SERPL-MCNC: 90 MG/DL (ref 65–99)
POTASSIUM SERPL-SCNC: 3.8 MMOL/L (ref 3.5–5.3)
PROT SERPL-MCNC: 7.6 G/DL (ref 6.4–8.2)
SODIUM SERPL-SCNC: 137 MMOL/L (ref 136–145)

## 2019-04-30 PROCEDURE — 36415 COLL VENOUS BLD VENIPUNCTURE: CPT

## 2019-04-30 PROCEDURE — 80053 COMPREHEN METABOLIC PANEL: CPT

## 2019-05-11 ENCOUNTER — APPOINTMENT (EMERGENCY)
Dept: RADIOLOGY | Facility: HOSPITAL | Age: 84
DRG: 065 | End: 2019-05-11
Payer: MEDICARE

## 2019-05-11 ENCOUNTER — HOSPITAL ENCOUNTER (INPATIENT)
Facility: HOSPITAL | Age: 84
LOS: 3 days | Discharge: HOME HEALTH | DRG: 065 | End: 2019-05-15
Attending: EMERGENCY MEDICINE | Admitting: INTERNAL MEDICINE
Payer: MEDICARE

## 2019-05-11 DIAGNOSIS — G60.9 IDIOPATHIC PERIPHERAL NEUROPATHY: Chronic | ICD-10-CM

## 2019-05-11 DIAGNOSIS — R53.1 GENERALIZED WEAKNESS: ICD-10-CM

## 2019-05-11 DIAGNOSIS — R41.82 ALTERED MENTAL STATUS: Primary | ICD-10-CM
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
## 2019-05-11 DIAGNOSIS — I10 BENIGN ESSENTIAL HYPERTENSION: ICD-10-CM

## 2019-05-11 DIAGNOSIS — R41.0 DISORIENTATION: ICD-10-CM

## 2019-05-11 LAB
ALBUMIN SERPL BCP-MCNC: 3.3 G/DL (ref 3.5–5)
ALP SERPL-CCNC: 55 U/L (ref 46–116)
ALT SERPL W P-5'-P-CCNC: 15 U/L (ref 12–78)
ANION GAP SERPL CALCULATED.3IONS-SCNC: 5 MMOL/L (ref 4–13)
AST SERPL W P-5'-P-CCNC: 13 U/L (ref 5–45)
BASOPHILS # BLD AUTO: 0.11 THOUSANDS/ΜL (ref 0–0.1)
BASOPHILS NFR BLD AUTO: 2 % (ref 0–1)
BILIRUB SERPL-MCNC: 0.5 MG/DL (ref 0.2–1)
BILIRUB UR QL STRIP: NEGATIVE
BUN SERPL-MCNC: 22 MG/DL (ref 5–25)
CALCIUM SERPL-MCNC: 8.7 MG/DL (ref 8.3–10.1)
CHLORIDE SERPL-SCNC: 105 MMOL/L (ref 100–108)
CK SERPL-CCNC: 67 U/L (ref 39–308)
CLARITY UR: CLEAR
CLARITY, POC: NORMAL
CO2 SERPL-SCNC: 29 MMOL/L (ref 21–32)
COLOR UR: YELLOW
COLOR, POC: NORMAL
CREAT SERPL-MCNC: 1.25 MG/DL (ref 0.6–1.3)
EOSINOPHIL # BLD AUTO: 0.59 THOUSAND/ΜL (ref 0–0.61)
EOSINOPHIL NFR BLD AUTO: 9 % (ref 0–6)
ERYTHROCYTE [DISTWIDTH] IN BLOOD BY AUTOMATED COUNT: 17.7 % (ref 11.6–15.1)
GFR SERPL CREATININE-BSD FRML MDRD: 50 ML/MIN/1.73SQ M
GLUCOSE SERPL-MCNC: 103 MG/DL (ref 65–140)
GLUCOSE SERPL-MCNC: 97 MG/DL (ref 65–140)
GLUCOSE UR STRIP-MCNC: NEGATIVE MG/DL
HCT VFR BLD AUTO: 37.3 % (ref 36.5–49.3)
HGB BLD-MCNC: 11.7 G/DL (ref 12–17)
HGB UR QL STRIP.AUTO: NEGATIVE
IMM GRANULOCYTES # BLD AUTO: 0.03 THOUSAND/UL (ref 0–0.2)
IMM GRANULOCYTES NFR BLD AUTO: 0 % (ref 0–2)
KETONES UR STRIP-MCNC: NEGATIVE MG/DL
LACTATE SERPL-SCNC: 1.7 MMOL/L (ref 0.5–2)
LEUKOCYTE ESTERASE UR QL STRIP: NEGATIVE
LYMPHOCYTES # BLD AUTO: 0.62 THOUSANDS/ΜL (ref 0.6–4.47)
LYMPHOCYTES NFR BLD AUTO: 9 % (ref 14–44)
MCH RBC QN AUTO: 28.7 PG (ref 26.8–34.3)
MCHC RBC AUTO-ENTMCNC: 31.4 G/DL (ref 31.4–37.4)
MCV RBC AUTO: 92 FL (ref 82–98)
MONOCYTES # BLD AUTO: 0.99 THOUSAND/ΜL (ref 0.17–1.22)
MONOCYTES NFR BLD AUTO: 14 % (ref 4–12)
NEUTROPHILS # BLD AUTO: 4.59 THOUSANDS/ΜL (ref 1.85–7.62)
NEUTS SEG NFR BLD AUTO: 66 % (ref 43–75)
NITRITE UR QL STRIP: NEGATIVE
NRBC BLD AUTO-RTO: 0 /100 WBCS
PH UR STRIP.AUTO: 5.5 [PH] (ref 4.5–8)
PLATELET # BLD AUTO: 189 THOUSANDS/UL (ref 149–390)
PMV BLD AUTO: 10.3 FL (ref 8.9–12.7)
POTASSIUM SERPL-SCNC: 4 MMOL/L (ref 3.5–5.3)
PROT SERPL-MCNC: 7.3 G/DL (ref 6.4–8.2)
PROT UR STRIP-MCNC: NEGATIVE MG/DL
RBC # BLD AUTO: 4.07 MILLION/UL (ref 3.88–5.62)
SODIUM SERPL-SCNC: 139 MMOL/L (ref 136–145)
SP GR UR STRIP.AUTO: 1.02 (ref 1–1.03)
TROPONIN I SERPL-MCNC: <0.02 NG/ML
TSH SERPL DL<=0.05 MIU/L-ACNC: 0.94 UIU/ML (ref 0.36–3.74)
UROBILINOGEN UR QL STRIP.AUTO: 0.2 E.U./DL
WBC # BLD AUTO: 6.93 THOUSAND/UL (ref 4.31–10.16)

## 2019-05-11 PROCEDURE — 87040 BLOOD CULTURE FOR BACTERIA: CPT | Performed by: EMERGENCY MEDICINE

## 2019-05-11 PROCEDURE — 99285 EMERGENCY DEPT VISIT HI MDM: CPT | Performed by: EMERGENCY MEDICINE

## 2019-05-11 PROCEDURE — 99285 EMERGENCY DEPT VISIT HI MDM: CPT

## 2019-05-11 PROCEDURE — 70450 CT HEAD/BRAIN W/O DYE: CPT

## 2019-05-11 PROCEDURE — 36415 COLL VENOUS BLD VENIPUNCTURE: CPT | Performed by: EMERGENCY MEDICINE

## 2019-05-11 PROCEDURE — 85025 COMPLETE CBC W/AUTO DIFF WBC: CPT | Performed by: EMERGENCY MEDICINE

## 2019-05-11 PROCEDURE — 84484 ASSAY OF TROPONIN QUANT: CPT | Performed by: EMERGENCY MEDICINE

## 2019-05-11 PROCEDURE — 99220 PR INITIAL OBSERVATION CARE/DAY 70 MINUTES: CPT | Performed by: INTERNAL MEDICINE

## 2019-05-11 PROCEDURE — 84443 ASSAY THYROID STIM HORMONE: CPT | Performed by: EMERGENCY MEDICINE

## 2019-05-11 PROCEDURE — 82550 ASSAY OF CK (CPK): CPT | Performed by: EMERGENCY MEDICINE

## 2019-05-11 PROCEDURE — 93005 ELECTROCARDIOGRAM TRACING: CPT

## 2019-05-11 PROCEDURE — 71046 X-RAY EXAM CHEST 2 VIEWS: CPT

## 2019-05-11 PROCEDURE — 81003 URINALYSIS AUTO W/O SCOPE: CPT

## 2019-05-11 PROCEDURE — 82948 REAGENT STRIP/BLOOD GLUCOSE: CPT

## 2019-05-11 PROCEDURE — 83605 ASSAY OF LACTIC ACID: CPT | Performed by: EMERGENCY MEDICINE

## 2019-05-11 PROCEDURE — 80053 COMPREHEN METABOLIC PANEL: CPT | Performed by: EMERGENCY MEDICINE

## 2019-05-11 RX ORDER — GABAPENTIN 300 MG/1
300 CAPSULE ORAL
Status: DISCONTINUED | OUTPATIENT
Start: 2019-05-11 | End: 2019-05-15 | Stop reason: HOSPADM

## 2019-05-11 RX ORDER — ATORVASTATIN CALCIUM 40 MG/1
40 TABLET, FILM COATED ORAL EVERY EVENING
Status: DISCONTINUED | OUTPATIENT
Start: 2019-05-11 | End: 2019-05-15 | Stop reason: HOSPADM

## 2019-05-11 RX ORDER — HEPARIN SODIUM 5000 [USP'U]/ML
5000 INJECTION, SOLUTION INTRAVENOUS; SUBCUTANEOUS EVERY 8 HOURS SCHEDULED
Status: DISCONTINUED | OUTPATIENT
Start: 2019-05-11 | End: 2019-05-15 | Stop reason: HOSPADM

## 2019-05-11 RX ORDER — METOPROLOL TARTRATE 50 MG/1
50 TABLET, FILM COATED ORAL 2 TIMES DAILY
Status: DISCONTINUED | OUTPATIENT
Start: 2019-05-11 | End: 2019-05-15

## 2019-05-11 RX ORDER — OXYBUTYNIN CHLORIDE 5 MG/1
5 TABLET, EXTENDED RELEASE ORAL DAILY
Status: DISCONTINUED | OUTPATIENT
Start: 2019-05-12 | End: 2019-05-15 | Stop reason: HOSPADM

## 2019-05-11 RX ORDER — SACCHAROMYCES BOULARDII 250 MG
250 CAPSULE ORAL 2 TIMES DAILY
Status: DISCONTINUED | OUTPATIENT
Start: 2019-05-11 | End: 2019-05-15 | Stop reason: HOSPADM

## 2019-05-11 RX ORDER — ASPIRIN 81 MG/1
81 TABLET, CHEWABLE ORAL DAILY
Status: DISCONTINUED | OUTPATIENT
Start: 2019-05-12 | End: 2019-05-15 | Stop reason: HOSPADM

## 2019-05-11 RX ORDER — SERTRALINE HYDROCHLORIDE 25 MG/1
25 TABLET, FILM COATED ORAL DAILY
Status: DISCONTINUED | OUTPATIENT
Start: 2019-05-12 | End: 2019-05-15 | Stop reason: HOSPADM

## 2019-05-11 RX ORDER — ACETAMINOPHEN 325 MG/1
650 TABLET ORAL EVERY 6 HOURS PRN
Status: DISCONTINUED | OUTPATIENT
Start: 2019-05-11 | End: 2019-05-15 | Stop reason: HOSPADM

## 2019-05-11 RX ORDER — PREDNISONE 2.5 MG
2.5 TABLET ORAL DAILY
Status: DISCONTINUED | OUTPATIENT
Start: 2019-05-12 | End: 2019-05-15 | Stop reason: HOSPADM

## 2019-05-11 RX ADMIN — Medication 250 MG: at 21:16

## 2019-05-11 RX ADMIN — ATORVASTATIN CALCIUM 40 MG: 40 TABLET, FILM COATED ORAL at 21:16

## 2019-05-11 RX ADMIN — HEPARIN SODIUM 5000 UNITS: 5000 INJECTION INTRAVENOUS; SUBCUTANEOUS at 21:17

## 2019-05-11 RX ADMIN — GABAPENTIN 300 MG: 300 CAPSULE ORAL at 21:17

## 2019-05-11 RX ADMIN — METOPROLOL TARTRATE 50 MG: 50 TABLET, FILM COATED ORAL at 21:16

## 2019-05-12 ENCOUNTER — APPOINTMENT (OUTPATIENT)
Dept: NON INVASIVE DIAGNOSTICS | Facility: HOSPITAL | Age: 84
DRG: 065 | End: 2019-05-12
Payer: MEDICARE

## 2019-05-12 LAB
ANION GAP SERPL CALCULATED.3IONS-SCNC: 6 MMOL/L (ref 4–13)
ATRIAL RATE: 64 BPM
BUN SERPL-MCNC: 21 MG/DL (ref 5–25)
CALCIUM SERPL-MCNC: 8.5 MG/DL (ref 8.3–10.1)
CHLORIDE SERPL-SCNC: 109 MMOL/L (ref 100–108)
CHOLEST SERPL-MCNC: 155 MG/DL (ref 50–200)
CO2 SERPL-SCNC: 26 MMOL/L (ref 21–32)
CREAT SERPL-MCNC: 1.13 MG/DL (ref 0.6–1.3)
ERYTHROCYTE [DISTWIDTH] IN BLOOD BY AUTOMATED COUNT: 17.7 % (ref 11.6–15.1)
EST. AVERAGE GLUCOSE BLD GHB EST-MCNC: 97 MG/DL
GFR SERPL CREATININE-BSD FRML MDRD: 57 ML/MIN/1.73SQ M
GLUCOSE SERPL-MCNC: 86 MG/DL (ref 65–140)
HBA1C MFR BLD: 5 % (ref 4.2–6.3)
HCT VFR BLD AUTO: 35.7 % (ref 36.5–49.3)
HDLC SERPL-MCNC: 29 MG/DL (ref 40–60)
HGB BLD-MCNC: 11.1 G/DL (ref 12–17)
LDLC SERPL CALC-MCNC: 104 MG/DL (ref 0–100)
MAGNESIUM SERPL-MCNC: 2.1 MG/DL (ref 1.6–2.6)
MCH RBC QN AUTO: 28.5 PG (ref 26.8–34.3)
MCHC RBC AUTO-ENTMCNC: 31.1 G/DL (ref 31.4–37.4)
MCV RBC AUTO: 92 FL (ref 82–98)
P AXIS: 86 DEGREES
PHOSPHATE SERPL-MCNC: 3.7 MG/DL (ref 2.3–4.1)
PLATELET # BLD AUTO: 186 THOUSANDS/UL (ref 149–390)
PMV BLD AUTO: 10.7 FL (ref 8.9–12.7)
POTASSIUM SERPL-SCNC: 3.7 MMOL/L (ref 3.5–5.3)
PR INTERVAL: 220 MS
QRS AXIS: -33 DEGREES
QRSD INTERVAL: 76 MS
QT INTERVAL: 416 MS
QTC INTERVAL: 429 MS
RBC # BLD AUTO: 3.89 MILLION/UL (ref 3.88–5.62)
SODIUM SERPL-SCNC: 141 MMOL/L (ref 136–145)
T WAVE AXIS: 33 DEGREES
TRIGL SERPL-MCNC: 110 MG/DL
VENTRICULAR RATE: 64 BPM
VIT B12 SERPL-MCNC: 635 PG/ML (ref 100–900)
WBC # BLD AUTO: 5.19 THOUSAND/UL (ref 4.31–10.16)

## 2019-05-12 PROCEDURE — 93880 EXTRACRANIAL BILAT STUDY: CPT

## 2019-05-12 PROCEDURE — 99223 1ST HOSP IP/OBS HIGH 75: CPT | Performed by: PSYCHIATRY & NEUROLOGY

## 2019-05-12 PROCEDURE — 84100 ASSAY OF PHOSPHORUS: CPT | Performed by: INTERNAL MEDICINE

## 2019-05-12 PROCEDURE — 82607 VITAMIN B-12: CPT | Performed by: NURSE PRACTITIONER

## 2019-05-12 PROCEDURE — 93010 ELECTROCARDIOGRAM REPORT: CPT | Performed by: INTERNAL MEDICINE

## 2019-05-12 PROCEDURE — G8988 SELF CARE GOAL STATUS: HCPCS

## 2019-05-12 PROCEDURE — 97167 OT EVAL HIGH COMPLEX 60 MIN: CPT

## 2019-05-12 PROCEDURE — 83036 HEMOGLOBIN GLYCOSYLATED A1C: CPT | Performed by: INTERNAL MEDICINE

## 2019-05-12 PROCEDURE — G8978 MOBILITY CURRENT STATUS: HCPCS

## 2019-05-12 PROCEDURE — 85027 COMPLETE CBC AUTOMATED: CPT | Performed by: INTERNAL MEDICINE

## 2019-05-12 PROCEDURE — 83735 ASSAY OF MAGNESIUM: CPT | Performed by: INTERNAL MEDICINE

## 2019-05-12 PROCEDURE — G8987 SELF CARE CURRENT STATUS: HCPCS

## 2019-05-12 PROCEDURE — 99232 SBSQ HOSP IP/OBS MODERATE 35: CPT | Performed by: INTERNAL MEDICINE

## 2019-05-12 PROCEDURE — 80061 LIPID PANEL: CPT | Performed by: INTERNAL MEDICINE

## 2019-05-12 PROCEDURE — 80048 BASIC METABOLIC PNL TOTAL CA: CPT | Performed by: INTERNAL MEDICINE

## 2019-05-12 PROCEDURE — G8979 MOBILITY GOAL STATUS: HCPCS

## 2019-05-12 PROCEDURE — 97163 PT EVAL HIGH COMPLEX 45 MIN: CPT

## 2019-05-12 RX ADMIN — PREDNISONE 2.5 MG: 2.5 TABLET ORAL at 09:45

## 2019-05-12 RX ADMIN — Medication 250 MG: at 18:42

## 2019-05-12 RX ADMIN — ASPIRIN 81 MG 81 MG: 81 TABLET ORAL at 09:44

## 2019-05-12 RX ADMIN — SERTRALINE HYDROCHLORIDE 25 MG: 25 TABLET ORAL at 09:44

## 2019-05-12 RX ADMIN — METOPROLOL TARTRATE 50 MG: 50 TABLET, FILM COATED ORAL at 09:44

## 2019-05-12 RX ADMIN — GABAPENTIN 300 MG: 300 CAPSULE ORAL at 21:24

## 2019-05-12 RX ADMIN — ATORVASTATIN CALCIUM 40 MG: 40 TABLET, FILM COATED ORAL at 18:41

## 2019-05-12 RX ADMIN — METOPROLOL TARTRATE 50 MG: 50 TABLET, FILM COATED ORAL at 19:42

## 2019-05-12 RX ADMIN — ACETAMINOPHEN 650 MG: 325 TABLET, FILM COATED ORAL at 21:24

## 2019-05-12 RX ADMIN — OXYBUTYNIN CHLORIDE 5 MG: 5 TABLET, EXTENDED RELEASE ORAL at 09:44

## 2019-05-12 RX ADMIN — HEPARIN SODIUM 5000 UNITS: 5000 INJECTION INTRAVENOUS; SUBCUTANEOUS at 06:00

## 2019-05-12 RX ADMIN — HEPARIN SODIUM 5000 UNITS: 5000 INJECTION INTRAVENOUS; SUBCUTANEOUS at 21:24

## 2019-05-12 RX ADMIN — Medication 250 MG: at 09:44

## 2019-05-13 ENCOUNTER — APPOINTMENT (INPATIENT)
Dept: NON INVASIVE DIAGNOSTICS | Facility: HOSPITAL | Age: 84
DRG: 065 | End: 2019-05-13
Payer: MEDICARE

## 2019-05-13 LAB
ANION GAP SERPL CALCULATED.3IONS-SCNC: 5 MMOL/L (ref 4–13)
BUN SERPL-MCNC: 20 MG/DL (ref 5–25)
CALCIUM SERPL-MCNC: 8.4 MG/DL (ref 8.3–10.1)
CHLORIDE SERPL-SCNC: 107 MMOL/L (ref 100–108)
CO2 SERPL-SCNC: 27 MMOL/L (ref 21–32)
CREAT SERPL-MCNC: 1.14 MG/DL (ref 0.6–1.3)
ERYTHROCYTE [DISTWIDTH] IN BLOOD BY AUTOMATED COUNT: 17.4 % (ref 11.6–15.1)
GFR SERPL CREATININE-BSD FRML MDRD: 56 ML/MIN/1.73SQ M
GLUCOSE SERPL-MCNC: 84 MG/DL (ref 65–140)
HCT VFR BLD AUTO: 36 % (ref 36.5–49.3)
HGB BLD-MCNC: 11.1 G/DL (ref 12–17)
MAGNESIUM SERPL-MCNC: 2.1 MG/DL (ref 1.6–2.6)
MCH RBC QN AUTO: 28 PG (ref 26.8–34.3)
MCHC RBC AUTO-ENTMCNC: 30.8 G/DL (ref 31.4–37.4)
MCV RBC AUTO: 91 FL (ref 82–98)
PLATELET # BLD AUTO: 178 THOUSANDS/UL (ref 149–390)
PMV BLD AUTO: 10.6 FL (ref 8.9–12.7)
POTASSIUM SERPL-SCNC: 3.8 MMOL/L (ref 3.5–5.3)
RBC # BLD AUTO: 3.97 MILLION/UL (ref 3.88–5.62)
SODIUM SERPL-SCNC: 139 MMOL/L (ref 136–145)
WBC # BLD AUTO: 4.79 THOUSAND/UL (ref 4.31–10.16)

## 2019-05-13 PROCEDURE — 93306 TTE W/DOPPLER COMPLETE: CPT | Performed by: INTERNAL MEDICINE

## 2019-05-13 PROCEDURE — 83735 ASSAY OF MAGNESIUM: CPT | Performed by: PHYSICIAN ASSISTANT

## 2019-05-13 PROCEDURE — 85027 COMPLETE CBC AUTOMATED: CPT | Performed by: PHYSICIAN ASSISTANT

## 2019-05-13 PROCEDURE — 99232 SBSQ HOSP IP/OBS MODERATE 35: CPT | Performed by: INTERNAL MEDICINE

## 2019-05-13 PROCEDURE — 93880 EXTRACRANIAL BILAT STUDY: CPT | Performed by: SURGERY

## 2019-05-13 PROCEDURE — 99222 1ST HOSP IP/OBS MODERATE 55: CPT | Performed by: NURSE PRACTITIONER

## 2019-05-13 PROCEDURE — 80048 BASIC METABOLIC PNL TOTAL CA: CPT | Performed by: PHYSICIAN ASSISTANT

## 2019-05-13 PROCEDURE — 97535 SELF CARE MNGMENT TRAINING: CPT

## 2019-05-13 PROCEDURE — 93306 TTE W/DOPPLER COMPLETE: CPT

## 2019-05-13 RX ADMIN — GABAPENTIN 300 MG: 300 CAPSULE ORAL at 21:22

## 2019-05-13 RX ADMIN — OXYBUTYNIN CHLORIDE 5 MG: 5 TABLET, EXTENDED RELEASE ORAL at 08:44

## 2019-05-13 RX ADMIN — Medication 250 MG: at 08:45

## 2019-05-13 RX ADMIN — PREDNISONE 2.5 MG: 2.5 TABLET ORAL at 08:45

## 2019-05-13 RX ADMIN — HEPARIN SODIUM 5000 UNITS: 5000 INJECTION INTRAVENOUS; SUBCUTANEOUS at 05:19

## 2019-05-13 RX ADMIN — METOPROLOL TARTRATE 50 MG: 50 TABLET, FILM COATED ORAL at 16:58

## 2019-05-13 RX ADMIN — HEPARIN SODIUM 5000 UNITS: 5000 INJECTION INTRAVENOUS; SUBCUTANEOUS at 15:11

## 2019-05-13 RX ADMIN — SERTRALINE HYDROCHLORIDE 25 MG: 25 TABLET ORAL at 08:45

## 2019-05-13 RX ADMIN — ATORVASTATIN CALCIUM 40 MG: 40 TABLET, FILM COATED ORAL at 16:58

## 2019-05-13 RX ADMIN — HEPARIN SODIUM 5000 UNITS: 5000 INJECTION INTRAVENOUS; SUBCUTANEOUS at 21:22

## 2019-05-13 RX ADMIN — ASPIRIN 81 MG 81 MG: 81 TABLET ORAL at 08:45

## 2019-05-13 RX ADMIN — Medication 250 MG: at 16:57

## 2019-05-14 ENCOUNTER — APPOINTMENT (INPATIENT)
Dept: RADIOLOGY | Facility: HOSPITAL | Age: 84
DRG: 065 | End: 2019-05-14
Payer: MEDICARE

## 2019-05-14 PROBLEM — D64.9 NORMOCYTIC ANEMIA: Status: ACTIVE | Noted: 2019-05-14

## 2019-05-14 PROCEDURE — 97530 THERAPEUTIC ACTIVITIES: CPT

## 2019-05-14 PROCEDURE — 99232 SBSQ HOSP IP/OBS MODERATE 35: CPT | Performed by: PHYSICIAN ASSISTANT

## 2019-05-14 PROCEDURE — 99232 SBSQ HOSP IP/OBS MODERATE 35: CPT | Performed by: INTERNAL MEDICINE

## 2019-05-14 PROCEDURE — 97110 THERAPEUTIC EXERCISES: CPT

## 2019-05-14 PROCEDURE — 99232 SBSQ HOSP IP/OBS MODERATE 35: CPT | Performed by: PSYCHIATRY & NEUROLOGY

## 2019-05-14 PROCEDURE — 70551 MRI BRAIN STEM W/O DYE: CPT

## 2019-05-14 RX ORDER — DOCUSATE SODIUM 100 MG/1
100 CAPSULE, LIQUID FILLED ORAL 2 TIMES DAILY
Status: DISCONTINUED | OUTPATIENT
Start: 2019-05-14 | End: 2019-05-15 | Stop reason: HOSPADM

## 2019-05-14 RX ORDER — POLYETHYLENE GLYCOL 3350 17 G/17G
17 POWDER, FOR SOLUTION ORAL DAILY PRN
Status: DISCONTINUED | OUTPATIENT
Start: 2019-05-14 | End: 2019-05-15 | Stop reason: HOSPADM

## 2019-05-14 RX ORDER — SENNOSIDES 8.6 MG
1 TABLET ORAL 2 TIMES DAILY
Status: DISCONTINUED | OUTPATIENT
Start: 2019-05-14 | End: 2019-05-15 | Stop reason: HOSPADM

## 2019-05-14 RX ADMIN — HEPARIN SODIUM 5000 UNITS: 5000 INJECTION INTRAVENOUS; SUBCUTANEOUS at 13:43

## 2019-05-14 RX ADMIN — DOCUSATE SODIUM 100 MG: 100 CAPSULE, LIQUID FILLED ORAL at 17:41

## 2019-05-14 RX ADMIN — OXYBUTYNIN CHLORIDE 5 MG: 5 TABLET, EXTENDED RELEASE ORAL at 09:20

## 2019-05-14 RX ADMIN — SENNOSIDES 8.6 MG: 8.6 TABLET, FILM COATED ORAL at 11:06

## 2019-05-14 RX ADMIN — HEPARIN SODIUM 5000 UNITS: 5000 INJECTION INTRAVENOUS; SUBCUTANEOUS at 21:44

## 2019-05-14 RX ADMIN — ASPIRIN 81 MG 81 MG: 81 TABLET ORAL at 09:23

## 2019-05-14 RX ADMIN — METOPROLOL TARTRATE 50 MG: 50 TABLET, FILM COATED ORAL at 17:41

## 2019-05-14 RX ADMIN — Medication 250 MG: at 09:20

## 2019-05-14 RX ADMIN — GABAPENTIN 300 MG: 300 CAPSULE ORAL at 21:44

## 2019-05-14 RX ADMIN — SENNOSIDES 8.6 MG: 8.6 TABLET, FILM COATED ORAL at 17:41

## 2019-05-14 RX ADMIN — Medication 250 MG: at 17:41

## 2019-05-14 RX ADMIN — DOCUSATE SODIUM 100 MG: 100 CAPSULE, LIQUID FILLED ORAL at 11:06

## 2019-05-14 RX ADMIN — PREDNISONE 2.5 MG: 2.5 TABLET ORAL at 09:20

## 2019-05-14 RX ADMIN — ATORVASTATIN CALCIUM 40 MG: 40 TABLET, FILM COATED ORAL at 17:41

## 2019-05-14 RX ADMIN — HEPARIN SODIUM 5000 UNITS: 5000 INJECTION INTRAVENOUS; SUBCUTANEOUS at 05:10

## 2019-05-14 RX ADMIN — SERTRALINE HYDROCHLORIDE 25 MG: 25 TABLET ORAL at 09:20

## 2019-05-14 RX ADMIN — METOPROLOL TARTRATE 50 MG: 50 TABLET, FILM COATED ORAL at 09:20

## 2019-05-15 VITALS
DIASTOLIC BLOOD PRESSURE: 67 MMHG | BODY MASS INDEX: 24.86 KG/M2 | SYSTOLIC BLOOD PRESSURE: 141 MMHG | HEART RATE: 55 BPM | OXYGEN SATURATION: 98 % | HEIGHT: 68 IN | RESPIRATION RATE: 16 BRPM | TEMPERATURE: 97.9 F | WEIGHT: 164.02 LBS

## 2019-05-15 DIAGNOSIS — Z71.89 COMPLEX CARE COORDINATION: Primary | ICD-10-CM

## 2019-05-15 PROBLEM — I63.9 CVA (CEREBRAL VASCULAR ACCIDENT) (HCC): Status: ACTIVE | Noted: 2019-05-11

## 2019-05-15 PROCEDURE — 99232 SBSQ HOSP IP/OBS MODERATE 35: CPT | Performed by: PSYCHIATRY & NEUROLOGY

## 2019-05-15 PROCEDURE — 99239 HOSP IP/OBS DSCHRG MGMT >30: CPT | Performed by: INTERNAL MEDICINE

## 2019-05-15 PROCEDURE — 87081 CULTURE SCREEN ONLY: CPT | Performed by: INTERNAL MEDICINE

## 2019-05-15 PROCEDURE — 87147 CULTURE TYPE IMMUNOLOGIC: CPT | Performed by: INTERNAL MEDICINE

## 2019-05-15 RX ORDER — ASPIRIN 81 MG/1
81 TABLET, CHEWABLE ORAL DAILY
Qty: 30 TABLET | Refills: 0 | Status: SHIPPED | OUTPATIENT
Start: 2019-05-16 | End: 2019-05-15

## 2019-05-15 RX ORDER — GABAPENTIN 300 MG/1
300 CAPSULE ORAL
Qty: 180 CAPSULE | Refills: 0 | Status: ON HOLD | OUTPATIENT
Start: 2019-05-15 | End: 2019-07-02 | Stop reason: SDUPTHER

## 2019-05-15 RX ORDER — ASPIRIN 81 MG/1
81 TABLET, CHEWABLE ORAL DAILY
Qty: 30 TABLET | Refills: 0 | Status: SHIPPED | OUTPATIENT
Start: 2019-05-16 | End: 2019-07-02 | Stop reason: HOSPADM

## 2019-05-15 RX ORDER — AMLODIPINE BESYLATE 5 MG/1
5 TABLET ORAL DAILY
Qty: 30 TABLET | Refills: 0 | Status: SHIPPED | OUTPATIENT
Start: 2019-05-16 | End: 2019-05-15

## 2019-05-15 RX ORDER — AMLODIPINE BESYLATE 5 MG/1
5 TABLET ORAL DAILY
Qty: 30 TABLET | Refills: 0 | Status: SHIPPED | OUTPATIENT
Start: 2019-05-16

## 2019-05-15 RX ORDER — ATORVASTATIN CALCIUM 40 MG/1
40 TABLET, FILM COATED ORAL EVERY EVENING
Qty: 30 TABLET | Refills: 0 | Status: SHIPPED | OUTPATIENT
Start: 2019-05-15 | End: 2019-05-15

## 2019-05-15 RX ORDER — AMLODIPINE BESYLATE 5 MG/1
5 TABLET ORAL DAILY
Status: DISCONTINUED | OUTPATIENT
Start: 2019-05-15 | End: 2019-05-15 | Stop reason: HOSPADM

## 2019-05-15 RX ORDER — METOPROLOL TARTRATE 50 MG/1
25 TABLET, FILM COATED ORAL 2 TIMES DAILY
Qty: 60 TABLET | Refills: 0 | Status: SHIPPED | OUTPATIENT
Start: 2019-05-15

## 2019-05-15 RX ORDER — ATORVASTATIN CALCIUM 40 MG/1
40 TABLET, FILM COATED ORAL EVERY EVENING
Qty: 30 TABLET | Refills: 0 | Status: SHIPPED | OUTPATIENT
Start: 2019-05-15

## 2019-05-15 RX ADMIN — ASPIRIN 81 MG 81 MG: 81 TABLET ORAL at 08:55

## 2019-05-15 RX ADMIN — SENNOSIDES 8.6 MG: 8.6 TABLET, FILM COATED ORAL at 08:55

## 2019-05-15 RX ADMIN — AMLODIPINE BESYLATE 5 MG: 5 TABLET ORAL at 08:58

## 2019-05-15 RX ADMIN — PREDNISONE 2.5 MG: 2.5 TABLET ORAL at 08:56

## 2019-05-15 RX ADMIN — SERTRALINE HYDROCHLORIDE 25 MG: 25 TABLET ORAL at 08:55

## 2019-05-15 RX ADMIN — METOPROLOL TARTRATE 50 MG: 50 TABLET, FILM COATED ORAL at 08:55

## 2019-05-15 RX ADMIN — DOCUSATE SODIUM 100 MG: 100 CAPSULE, LIQUID FILLED ORAL at 08:55

## 2019-05-15 RX ADMIN — HEPARIN SODIUM 5000 UNITS: 5000 INJECTION INTRAVENOUS; SUBCUTANEOUS at 13:07

## 2019-05-15 RX ADMIN — POLYETHYLENE GLYCOL 3350 17 G: 17 POWDER, FOR SOLUTION ORAL at 08:55

## 2019-05-15 RX ADMIN — OXYBUTYNIN CHLORIDE 5 MG: 5 TABLET, EXTENDED RELEASE ORAL at 08:55

## 2019-05-15 RX ADMIN — Medication 250 MG: at 08:55

## 2019-05-15 RX ADMIN — HEPARIN SODIUM 5000 UNITS: 5000 INJECTION INTRAVENOUS; SUBCUTANEOUS at 05:41

## 2019-05-16 ENCOUNTER — TRANSITIONAL CARE MANAGEMENT (OUTPATIENT)
Dept: FAMILY MEDICINE CLINIC | Facility: CLINIC | Age: 84
End: 2019-05-16

## 2019-05-16 ENCOUNTER — TELEPHONE (OUTPATIENT)
Dept: NEUROLOGY | Facility: CLINIC | Age: 84
End: 2019-05-16

## 2019-05-16 LAB
BACTERIA BLD CULT: NORMAL
BACTERIA BLD CULT: NORMAL

## 2019-05-18 LAB — MRSA NOSE QL CULT: NORMAL

## 2019-05-21 ENCOUNTER — TELEPHONE (OUTPATIENT)
Dept: NEUROLOGY | Facility: CLINIC | Age: 84
End: 2019-05-21

## 2019-05-27 ENCOUNTER — HOSPITAL ENCOUNTER (INPATIENT)
Facility: HOSPITAL | Age: 84
LOS: 3 days | Discharge: NURSING, REHAB OR LONG-TERM CARE | DRG: 481 | End: 2019-05-30
Attending: EMERGENCY MEDICINE | Admitting: INTERNAL MEDICINE
Payer: MEDICARE

## 2019-05-27 ENCOUNTER — APPOINTMENT (EMERGENCY)
Dept: RADIOLOGY | Facility: HOSPITAL | Age: 84
DRG: 481 | End: 2019-05-27
Payer: MEDICARE

## 2019-05-27 DIAGNOSIS — S72.001A CLOSED FRACTURE OF RIGHT HIP, INITIAL ENCOUNTER (HCC): ICD-10-CM
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
## 2019-05-27 DIAGNOSIS — S72.141A DISPLACED INTERTROCHANTERIC FRACTURE OF RIGHT FEMUR, INITIAL ENCOUNTER FOR CLOSED FRACTURE (HCC): Primary | ICD-10-CM

## 2019-05-27 DIAGNOSIS — R79.89 ELEVATED SERUM CREATININE: ICD-10-CM

## 2019-05-27 PROBLEM — Z01.818 PREOPERATIVE EXAMINATION: Status: ACTIVE | Noted: 2019-05-27

## 2019-05-27 LAB
ABO GROUP BLD: NORMAL
ANION GAP SERPL CALCULATED.3IONS-SCNC: 7 MMOL/L (ref 4–13)
APTT PPP: 29 SECONDS (ref 26–38)
ATRIAL RATE: 59 BPM
BASOPHILS # BLD AUTO: 0.13 THOUSANDS/ΜL (ref 0–0.1)
BASOPHILS NFR BLD AUTO: 1 % (ref 0–1)
BLD GP AB SCN SERPL QL: NEGATIVE
BUN SERPL-MCNC: 26 MG/DL (ref 5–25)
CALCIUM SERPL-MCNC: 8.9 MG/DL (ref 8.3–10.1)
CHLORIDE SERPL-SCNC: 106 MMOL/L (ref 100–108)
CO2 SERPL-SCNC: 24 MMOL/L (ref 21–32)
CREAT SERPL-MCNC: 1.38 MG/DL (ref 0.6–1.3)
EOSINOPHIL # BLD AUTO: 0.59 THOUSAND/ΜL (ref 0–0.61)
EOSINOPHIL NFR BLD AUTO: 6 % (ref 0–6)
ERYTHROCYTE [DISTWIDTH] IN BLOOD BY AUTOMATED COUNT: 17.1 % (ref 11.6–15.1)
GFR SERPL CREATININE-BSD FRML MDRD: 44 ML/MIN/1.73SQ M
GLUCOSE SERPL-MCNC: 147 MG/DL (ref 65–140)
HCT VFR BLD AUTO: 33.4 % (ref 36.5–49.3)
HGB BLD-MCNC: 10.4 G/DL (ref 12–17)
IMM GRANULOCYTES # BLD AUTO: 0.04 THOUSAND/UL (ref 0–0.2)
IMM GRANULOCYTES NFR BLD AUTO: 0 % (ref 0–2)
INR PPP: 1.2 (ref 0.86–1.17)
LYMPHOCYTES # BLD AUTO: 0.67 THOUSANDS/ΜL (ref 0.6–4.47)
LYMPHOCYTES NFR BLD AUTO: 6 % (ref 14–44)
MCH RBC QN AUTO: 28.3 PG (ref 26.8–34.3)
MCHC RBC AUTO-ENTMCNC: 31.1 G/DL (ref 31.4–37.4)
MCV RBC AUTO: 91 FL (ref 82–98)
MONOCYTES # BLD AUTO: 1.51 THOUSAND/ΜL (ref 0.17–1.22)
MONOCYTES NFR BLD AUTO: 14 % (ref 4–12)
NEUTROPHILS # BLD AUTO: 7.66 THOUSANDS/ΜL (ref 1.85–7.62)
NEUTS SEG NFR BLD AUTO: 73 % (ref 43–75)
NRBC BLD AUTO-RTO: 0 /100 WBCS
P AXIS: 106 DEGREES
PLATELET # BLD AUTO: 277 THOUSANDS/UL (ref 149–390)
PMV BLD AUTO: 10.6 FL (ref 8.9–12.7)
POTASSIUM SERPL-SCNC: 3.8 MMOL/L (ref 3.5–5.3)
PR INTERVAL: 198 MS
PROTHROMBIN TIME: 15.3 SECONDS (ref 11.8–14.2)
QRS AXIS: -36 DEGREES
QRSD INTERVAL: 84 MS
QT INTERVAL: 414 MS
QTC INTERVAL: 409 MS
RBC # BLD AUTO: 3.68 MILLION/UL (ref 3.88–5.62)
RH BLD: POSITIVE
SODIUM SERPL-SCNC: 137 MMOL/L (ref 136–145)
SPECIMEN EXPIRATION DATE: NORMAL
T WAVE AXIS: 8 DEGREES
TROPONIN I SERPL-MCNC: 0.02 NG/ML
VENTRICULAR RATE: 59 BPM
WBC # BLD AUTO: 10.6 THOUSAND/UL (ref 4.31–10.16)

## 2019-05-27 PROCEDURE — 86850 RBC ANTIBODY SCREEN: CPT | Performed by: EMERGENCY MEDICINE

## 2019-05-27 PROCEDURE — 93005 ELECTROCARDIOGRAM TRACING: CPT

## 2019-05-27 PROCEDURE — 70450 CT HEAD/BRAIN W/O DYE: CPT

## 2019-05-27 PROCEDURE — 99223 1ST HOSP IP/OBS HIGH 75: CPT | Performed by: ORTHOPAEDIC SURGERY

## 2019-05-27 PROCEDURE — 96361 HYDRATE IV INFUSION ADD-ON: CPT

## 2019-05-27 PROCEDURE — 90471 IMMUNIZATION ADMIN: CPT

## 2019-05-27 PROCEDURE — 72125 CT NECK SPINE W/O DYE: CPT

## 2019-05-27 PROCEDURE — 73552 X-RAY EXAM OF FEMUR 2/>: CPT

## 2019-05-27 PROCEDURE — 85730 THROMBOPLASTIN TIME PARTIAL: CPT | Performed by: EMERGENCY MEDICINE

## 2019-05-27 PROCEDURE — 96374 THER/PROPH/DIAG INJ IV PUSH: CPT

## 2019-05-27 PROCEDURE — 36415 COLL VENOUS BLD VENIPUNCTURE: CPT | Performed by: EMERGENCY MEDICINE

## 2019-05-27 PROCEDURE — 71045 X-RAY EXAM CHEST 1 VIEW: CPT

## 2019-05-27 PROCEDURE — 72170 X-RAY EXAM OF PELVIS: CPT

## 2019-05-27 PROCEDURE — 86901 BLOOD TYPING SEROLOGIC RH(D): CPT | Performed by: EMERGENCY MEDICINE

## 2019-05-27 PROCEDURE — 99223 1ST HOSP IP/OBS HIGH 75: CPT | Performed by: INTERNAL MEDICINE

## 2019-05-27 PROCEDURE — 93010 ELECTROCARDIOGRAM REPORT: CPT | Performed by: INTERNAL MEDICINE

## 2019-05-27 PROCEDURE — 99285 EMERGENCY DEPT VISIT HI MDM: CPT

## 2019-05-27 PROCEDURE — 99285 EMERGENCY DEPT VISIT HI MDM: CPT | Performed by: EMERGENCY MEDICINE

## 2019-05-27 PROCEDURE — 80048 BASIC METABOLIC PNL TOTAL CA: CPT | Performed by: EMERGENCY MEDICINE

## 2019-05-27 PROCEDURE — 86900 BLOOD TYPING SEROLOGIC ABO: CPT | Performed by: EMERGENCY MEDICINE

## 2019-05-27 PROCEDURE — 84484 ASSAY OF TROPONIN QUANT: CPT | Performed by: EMERGENCY MEDICINE

## 2019-05-27 PROCEDURE — 85610 PROTHROMBIN TIME: CPT | Performed by: EMERGENCY MEDICINE

## 2019-05-27 PROCEDURE — 90715 TDAP VACCINE 7 YRS/> IM: CPT | Performed by: EMERGENCY MEDICINE

## 2019-05-27 PROCEDURE — 85025 COMPLETE CBC W/AUTO DIFF WBC: CPT | Performed by: EMERGENCY MEDICINE

## 2019-05-27 RX ORDER — FENTANYL CITRATE 50 UG/ML
50 INJECTION, SOLUTION INTRAMUSCULAR; INTRAVENOUS ONCE
Status: COMPLETED | OUTPATIENT
Start: 2019-05-27 | End: 2019-05-27

## 2019-05-27 RX ORDER — SERTRALINE HYDROCHLORIDE 25 MG/1
25 TABLET, FILM COATED ORAL DAILY
Status: DISCONTINUED | OUTPATIENT
Start: 2019-05-28 | End: 2019-05-30 | Stop reason: HOSPADM

## 2019-05-27 RX ORDER — AMLODIPINE BESYLATE 5 MG/1
5 TABLET ORAL DAILY
Status: DISCONTINUED | OUTPATIENT
Start: 2019-05-28 | End: 2019-05-30 | Stop reason: HOSPADM

## 2019-05-27 RX ORDER — ASPIRIN 81 MG/1
81 TABLET, CHEWABLE ORAL DAILY
Status: DISCONTINUED | OUTPATIENT
Start: 2019-05-28 | End: 2019-05-30 | Stop reason: HOSPADM

## 2019-05-27 RX ORDER — LACTOBACILLUS ACIDOPHILUS / LACTOBACILLUS BULGARICUS 100 MILLION CFU STRENGTH
1 GRANULES ORAL
Status: DISCONTINUED | OUTPATIENT
Start: 2019-05-28 | End: 2019-05-30 | Stop reason: HOSPADM

## 2019-05-27 RX ORDER — PREDNISONE 2.5 MG
2.5 TABLET ORAL DAILY
Status: DISCONTINUED | OUTPATIENT
Start: 2019-05-28 | End: 2019-05-30 | Stop reason: HOSPADM

## 2019-05-27 RX ORDER — OXYCODONE HYDROCHLORIDE 5 MG/1
2.5 TABLET ORAL EVERY 4 HOURS PRN
Status: DISCONTINUED | OUTPATIENT
Start: 2019-05-27 | End: 2019-05-30 | Stop reason: HOSPADM

## 2019-05-27 RX ORDER — GABAPENTIN 300 MG/1
300 CAPSULE ORAL
Status: DISCONTINUED | OUTPATIENT
Start: 2019-05-27 | End: 2019-05-30 | Stop reason: HOSPADM

## 2019-05-27 RX ORDER — HYDROMORPHONE HCL/PF 1 MG/ML
0.2 SYRINGE (ML) INJECTION
Status: DISCONTINUED | OUTPATIENT
Start: 2019-05-27 | End: 2019-05-28

## 2019-05-27 RX ORDER — ATORVASTATIN CALCIUM 40 MG/1
40 TABLET, FILM COATED ORAL EVERY EVENING
Status: DISCONTINUED | OUTPATIENT
Start: 2019-05-27 | End: 2019-05-30 | Stop reason: HOSPADM

## 2019-05-27 RX ORDER — OXYCODONE HYDROCHLORIDE 5 MG/1
5 TABLET ORAL EVERY 4 HOURS PRN
Status: DISCONTINUED | OUTPATIENT
Start: 2019-05-27 | End: 2019-05-30 | Stop reason: HOSPADM

## 2019-05-27 RX ORDER — SODIUM CHLORIDE, SODIUM LACTATE, POTASSIUM CHLORIDE, CALCIUM CHLORIDE 600; 310; 30; 20 MG/100ML; MG/100ML; MG/100ML; MG/100ML
75 INJECTION, SOLUTION INTRAVENOUS CONTINUOUS
Status: DISCONTINUED | OUTPATIENT
Start: 2019-05-28 | End: 2019-05-28

## 2019-05-27 RX ORDER — HEPARIN SODIUM 5000 [USP'U]/ML
5000 INJECTION, SOLUTION INTRAVENOUS; SUBCUTANEOUS EVERY 8 HOURS SCHEDULED
Status: DISCONTINUED | OUTPATIENT
Start: 2019-05-27 | End: 2019-05-30 | Stop reason: HOSPADM

## 2019-05-27 RX ORDER — ACETAMINOPHEN 325 MG/1
975 TABLET ORAL EVERY 8 HOURS SCHEDULED
Status: DISCONTINUED | OUTPATIENT
Start: 2019-05-27 | End: 2019-05-30 | Stop reason: HOSPADM

## 2019-05-27 RX ORDER — OXYBUTYNIN CHLORIDE 5 MG/1
5 TABLET, EXTENDED RELEASE ORAL DAILY
Status: DISCONTINUED | OUTPATIENT
Start: 2019-05-28 | End: 2019-05-30 | Stop reason: HOSPADM

## 2019-05-27 RX ORDER — ASCORBIC ACID 500 MG
500 TABLET ORAL DAILY
Status: DISCONTINUED | OUTPATIENT
Start: 2019-05-28 | End: 2019-05-30 | Stop reason: HOSPADM

## 2019-05-27 RX ADMIN — ACETAMINOPHEN 975 MG: 325 TABLET ORAL at 22:07

## 2019-05-27 RX ADMIN — GABAPENTIN 300 MG: 300 CAPSULE ORAL at 22:07

## 2019-05-27 RX ADMIN — FENTANYL CITRATE 50 MCG: 50 INJECTION, SOLUTION INTRAMUSCULAR; INTRAVENOUS at 16:34

## 2019-05-27 RX ADMIN — HEPARIN SODIUM 5000 UNITS: 5000 INJECTION INTRAVENOUS; SUBCUTANEOUS at 22:07

## 2019-05-27 RX ADMIN — SODIUM CHLORIDE 500 ML: 0.9 INJECTION, SOLUTION INTRAVENOUS at 17:16

## 2019-05-27 RX ADMIN — TETANUS TOXOID, REDUCED DIPHTHERIA TOXOID AND ACELLULAR PERTUSSIS VACCINE, ADSORBED 0.5 ML: 5; 2.5; 8; 8; 2.5 SUSPENSION INTRAMUSCULAR at 17:15

## 2019-05-27 RX ADMIN — ATORVASTATIN CALCIUM 40 MG: 40 TABLET, FILM COATED ORAL at 22:06

## 2019-05-27 RX ADMIN — OXYCODONE HYDROCHLORIDE 5 MG: 5 TABLET ORAL at 20:21

## 2019-05-27 RX ADMIN — METOPROLOL TARTRATE 25 MG: 25 TABLET ORAL at 22:06

## 2019-05-28 ENCOUNTER — APPOINTMENT (INPATIENT)
Dept: RADIOLOGY | Facility: HOSPITAL | Age: 84
DRG: 481 | End: 2019-05-28
Payer: MEDICARE

## 2019-05-28 ENCOUNTER — ANESTHESIA EVENT (INPATIENT)
Dept: PERIOP | Facility: HOSPITAL | Age: 84
DRG: 481 | End: 2019-05-28
Payer: MEDICARE

## 2019-05-28 ENCOUNTER — ANESTHESIA (INPATIENT)
Dept: PERIOP | Facility: HOSPITAL | Age: 84
DRG: 481 | End: 2019-05-28
Payer: MEDICARE

## 2019-05-28 PROBLEM — D64.9 ANEMIA: Status: ACTIVE | Noted: 2017-01-17

## 2019-05-28 PROBLEM — I35.0 AORTIC STENOSIS, MILD: Chronic | Status: ACTIVE | Noted: 2019-05-28

## 2019-05-28 LAB
ANION GAP SERPL CALCULATED.3IONS-SCNC: 6 MMOL/L (ref 4–13)
APTT PPP: 32 SECONDS (ref 26–38)
BASOPHILS # BLD AUTO: 0.12 THOUSANDS/ΜL (ref 0–0.1)
BASOPHILS NFR BLD AUTO: 2 % (ref 0–1)
BUN SERPL-MCNC: 29 MG/DL (ref 5–25)
CALCIUM SERPL-MCNC: 8.6 MG/DL (ref 8.3–10.1)
CHLORIDE SERPL-SCNC: 110 MMOL/L (ref 100–108)
CO2 SERPL-SCNC: 23 MMOL/L (ref 21–32)
CREAT SERPL-MCNC: 1.24 MG/DL (ref 0.6–1.3)
EOSINOPHIL # BLD AUTO: 0.81 THOUSAND/ΜL (ref 0–0.61)
EOSINOPHIL NFR BLD AUTO: 10 % (ref 0–6)
ERYTHROCYTE [DISTWIDTH] IN BLOOD BY AUTOMATED COUNT: 17.1 % (ref 11.6–15.1)
GFR SERPL CREATININE-BSD FRML MDRD: 51 ML/MIN/1.73SQ M
GLUCOSE SERPL-MCNC: 114 MG/DL (ref 65–140)
HCT VFR BLD AUTO: 30.4 % (ref 36.5–49.3)
HGB BLD-MCNC: 9.5 G/DL (ref 12–17)
IMM GRANULOCYTES # BLD AUTO: 0.04 THOUSAND/UL (ref 0–0.2)
IMM GRANULOCYTES NFR BLD AUTO: 1 % (ref 0–2)
INR PPP: 1.25 (ref 0.86–1.17)
LYMPHOCYTES # BLD AUTO: 1.08 THOUSANDS/ΜL (ref 0.6–4.47)
LYMPHOCYTES NFR BLD AUTO: 14 % (ref 14–44)
MAGNESIUM SERPL-MCNC: 2 MG/DL (ref 1.6–2.6)
MCH RBC QN AUTO: 28.4 PG (ref 26.8–34.3)
MCHC RBC AUTO-ENTMCNC: 31.3 G/DL (ref 31.4–37.4)
MCV RBC AUTO: 91 FL (ref 82–98)
MONOCYTES # BLD AUTO: 1.32 THOUSAND/ΜL (ref 0.17–1.22)
MONOCYTES NFR BLD AUTO: 17 % (ref 4–12)
NEUTROPHILS # BLD AUTO: 4.58 THOUSANDS/ΜL (ref 1.85–7.62)
NEUTS SEG NFR BLD AUTO: 56 % (ref 43–75)
NRBC BLD AUTO-RTO: 0 /100 WBCS
PHOSPHATE SERPL-MCNC: 4 MG/DL (ref 2.3–4.1)
PLATELET # BLD AUTO: 248 THOUSANDS/UL (ref 149–390)
PMV BLD AUTO: 10.7 FL (ref 8.9–12.7)
POTASSIUM SERPL-SCNC: 3.8 MMOL/L (ref 3.5–5.3)
PROTHROMBIN TIME: 15.8 SECONDS (ref 11.8–14.2)
RBC # BLD AUTO: 3.35 MILLION/UL (ref 3.88–5.62)
SODIUM SERPL-SCNC: 139 MMOL/L (ref 136–145)
WBC # BLD AUTO: 7.95 THOUSAND/UL (ref 4.31–10.16)

## 2019-05-28 PROCEDURE — 0QSB06Z REPOSITION RIGHT LOWER FEMUR WITH INTRAMEDULLARY INTERNAL FIXATION DEVICE, OPEN APPROACH: ICD-10-PCS | Performed by: ORTHOPAEDIC SURGERY

## 2019-05-28 PROCEDURE — 73502 X-RAY EXAM HIP UNI 2-3 VIEWS: CPT

## 2019-05-28 PROCEDURE — 85025 COMPLETE CBC W/AUTO DIFF WBC: CPT | Performed by: INTERNAL MEDICINE

## 2019-05-28 PROCEDURE — 85730 THROMBOPLASTIN TIME PARTIAL: CPT | Performed by: INTERNAL MEDICINE

## 2019-05-28 PROCEDURE — C1713 ANCHOR/SCREW BN/BN,TIS/BN: HCPCS | Performed by: ORTHOPAEDIC SURGERY

## 2019-05-28 PROCEDURE — 27245 TREAT THIGH FRACTURE: CPT | Performed by: ORTHOPAEDIC SURGERY

## 2019-05-28 PROCEDURE — 83735 ASSAY OF MAGNESIUM: CPT | Performed by: INTERNAL MEDICINE

## 2019-05-28 PROCEDURE — 84165 PROTEIN E-PHORESIS SERUM: CPT | Performed by: PATHOLOGY

## 2019-05-28 PROCEDURE — 80048 BASIC METABOLIC PNL TOTAL CA: CPT | Performed by: INTERNAL MEDICINE

## 2019-05-28 PROCEDURE — 84100 ASSAY OF PHOSPHORUS: CPT | Performed by: INTERNAL MEDICINE

## 2019-05-28 PROCEDURE — 99232 SBSQ HOSP IP/OBS MODERATE 35: CPT | Performed by: NURSE PRACTITIONER

## 2019-05-28 PROCEDURE — 99024 POSTOP FOLLOW-UP VISIT: CPT | Performed by: ORTHOPAEDIC SURGERY

## 2019-05-28 PROCEDURE — 27245 TREAT THIGH FRACTURE: CPT | Performed by: PHYSICIAN ASSISTANT

## 2019-05-28 PROCEDURE — 85610 PROTHROMBIN TIME: CPT | Performed by: INTERNAL MEDICINE

## 2019-05-28 PROCEDURE — C1769 GUIDE WIRE: HCPCS | Performed by: ORTHOPAEDIC SURGERY

## 2019-05-28 PROCEDURE — 99222 1ST HOSP IP/OBS MODERATE 55: CPT | Performed by: INTERNAL MEDICINE

## 2019-05-28 DEVICE — 11.0MM TI HELICAL BLADE 105MM-STERILE: Type: IMPLANTABLE DEVICE | Site: FEMUR | Status: FUNCTIONAL

## 2019-05-28 DEVICE — 11MM/130 DEG TI CANN TROCH FIXATION NAIL 170MM-STERILE: Type: IMPLANTABLE DEVICE | Site: FEMUR | Status: FUNCTIONAL

## 2019-05-28 DEVICE — 5.0MM TI LOCKING SCREW W/T25 STARDRIVE 38MM F/IM NAIL-STER: Type: IMPLANTABLE DEVICE | Site: FEMUR | Status: FUNCTIONAL

## 2019-05-28 RX ORDER — CEFAZOLIN SODIUM 2 G/50ML
2000 SOLUTION INTRAVENOUS
Status: COMPLETED | OUTPATIENT
Start: 2019-05-28 | End: 2019-05-28

## 2019-05-28 RX ORDER — DEXAMETHASONE SODIUM PHOSPHATE 10 MG/ML
INJECTION, SOLUTION INTRAMUSCULAR; INTRAVENOUS AS NEEDED
Status: DISCONTINUED | OUTPATIENT
Start: 2019-05-28 | End: 2019-05-28 | Stop reason: SURG

## 2019-05-28 RX ORDER — SODIUM CHLORIDE 9 MG/ML
INJECTION, SOLUTION INTRAVENOUS CONTINUOUS PRN
Status: DISCONTINUED | OUTPATIENT
Start: 2019-05-28 | End: 2019-05-28

## 2019-05-28 RX ORDER — SODIUM CHLORIDE, SODIUM LACTATE, POTASSIUM CHLORIDE, CALCIUM CHLORIDE 600; 310; 30; 20 MG/100ML; MG/100ML; MG/100ML; MG/100ML
50 INJECTION, SOLUTION INTRAVENOUS CONTINUOUS
Status: DISCONTINUED | OUTPATIENT
Start: 2019-05-28 | End: 2019-05-28

## 2019-05-28 RX ORDER — FENTANYL CITRATE/PF 50 MCG/ML
25 SYRINGE (ML) INJECTION
Status: DISCONTINUED | OUTPATIENT
Start: 2019-05-28 | End: 2019-05-28

## 2019-05-28 RX ORDER — CEFAZOLIN SODIUM 2 G/50ML
2000 SOLUTION INTRAVENOUS EVERY 8 HOURS
Status: COMPLETED | OUTPATIENT
Start: 2019-05-28 | End: 2019-05-29

## 2019-05-28 RX ORDER — EPHEDRINE SULFATE 50 MG/ML
INJECTION INTRAVENOUS AS NEEDED
Status: DISCONTINUED | OUTPATIENT
Start: 2019-05-28 | End: 2019-05-28 | Stop reason: SURG

## 2019-05-28 RX ORDER — SODIUM CHLORIDE, SODIUM LACTATE, POTASSIUM CHLORIDE, CALCIUM CHLORIDE 600; 310; 30; 20 MG/100ML; MG/100ML; MG/100ML; MG/100ML
INJECTION, SOLUTION INTRAVENOUS CONTINUOUS PRN
Status: DISCONTINUED | OUTPATIENT
Start: 2019-05-28 | End: 2019-05-28

## 2019-05-28 RX ORDER — PROPOFOL 10 MG/ML
INJECTION, EMULSION INTRAVENOUS AS NEEDED
Status: DISCONTINUED | OUTPATIENT
Start: 2019-05-28 | End: 2019-05-28 | Stop reason: SURG

## 2019-05-28 RX ORDER — LIDOCAINE HYDROCHLORIDE 10 MG/ML
INJECTION, SOLUTION INFILTRATION; PERINEURAL AS NEEDED
Status: DISCONTINUED | OUTPATIENT
Start: 2019-05-28 | End: 2019-05-28 | Stop reason: SURG

## 2019-05-28 RX ORDER — OXYCODONE HYDROCHLORIDE 5 MG/1
TABLET ORAL
Qty: 15 TABLET | Refills: 0 | Status: SHIPPED | OUTPATIENT
Start: 2019-05-28 | End: 2019-05-30 | Stop reason: HOSPADM

## 2019-05-28 RX ORDER — HYDROMORPHONE HCL/PF 1 MG/ML
0.2 SYRINGE (ML) INJECTION
Status: DISCONTINUED | OUTPATIENT
Start: 2019-05-28 | End: 2019-05-28

## 2019-05-28 RX ORDER — FENTANYL CITRATE 50 UG/ML
INJECTION, SOLUTION INTRAMUSCULAR; INTRAVENOUS AS NEEDED
Status: DISCONTINUED | OUTPATIENT
Start: 2019-05-28 | End: 2019-05-28 | Stop reason: SURG

## 2019-05-28 RX ORDER — MAGNESIUM HYDROXIDE 1200 MG/15ML
LIQUID ORAL AS NEEDED
Status: DISCONTINUED | OUTPATIENT
Start: 2019-05-28 | End: 2019-05-28 | Stop reason: HOSPADM

## 2019-05-28 RX ORDER — HYDROMORPHONE HCL/PF 1 MG/ML
0.2 SYRINGE (ML) INJECTION EVERY 6 HOURS PRN
Status: ACTIVE | OUTPATIENT
Start: 2019-05-28 | End: 2019-05-29

## 2019-05-28 RX ORDER — ONDANSETRON 2 MG/ML
4 INJECTION INTRAMUSCULAR; INTRAVENOUS ONCE AS NEEDED
Status: DISCONTINUED | OUTPATIENT
Start: 2019-05-28 | End: 2019-05-28

## 2019-05-28 RX ADMIN — OXYCODONE HYDROCHLORIDE 5 MG: 5 TABLET ORAL at 19:59

## 2019-05-28 RX ADMIN — FENTANYL CITRATE 50 MCG: 50 INJECTION, SOLUTION INTRAMUSCULAR; INTRAVENOUS at 09:05

## 2019-05-28 RX ADMIN — EPHEDRINE SULFATE 5 MG: 50 INJECTION, SOLUTION INTRAVENOUS at 09:19

## 2019-05-28 RX ADMIN — LACTOBACILLUS ACIDOPHILUS / LACTOBACILLUS BULGARICUS 1 PACKET: 100 MILLION CFU STRENGTH GRANULES at 17:05

## 2019-05-28 RX ADMIN — SODIUM CHLORIDE, SODIUM LACTATE, POTASSIUM CHLORIDE, AND CALCIUM CHLORIDE 75 ML/HR: .6; .31; .03; .02 INJECTION, SOLUTION INTRAVENOUS at 00:07

## 2019-05-28 RX ADMIN — PHENYLEPHRINE HYDROCHLORIDE 100 MCG: 10 INJECTION INTRAVENOUS at 08:43

## 2019-05-28 RX ADMIN — EPHEDRINE SULFATE 5 MG: 50 INJECTION, SOLUTION INTRAVENOUS at 08:58

## 2019-05-28 RX ADMIN — PROPOFOL 140 MG: 10 INJECTION, EMULSION INTRAVENOUS at 08:35

## 2019-05-28 RX ADMIN — FENTANYL CITRATE 50 MCG: 50 INJECTION, SOLUTION INTRAMUSCULAR; INTRAVENOUS at 09:07

## 2019-05-28 RX ADMIN — PHENYLEPHRINE HYDROCHLORIDE 100 MCG: 10 INJECTION INTRAVENOUS at 08:35

## 2019-05-28 RX ADMIN — EPHEDRINE SULFATE 5 MG: 50 INJECTION, SOLUTION INTRAVENOUS at 08:50

## 2019-05-28 RX ADMIN — DEXAMETHASONE SODIUM PHOSPHATE 10 MG: 10 INJECTION, SOLUTION INTRAMUSCULAR; INTRAVENOUS at 08:59

## 2019-05-28 RX ADMIN — ATORVASTATIN CALCIUM 40 MG: 40 TABLET, FILM COATED ORAL at 17:04

## 2019-05-28 RX ADMIN — LACTOBACILLUS ACIDOPHILUS / LACTOBACILLUS BULGARICUS 1 PACKET: 100 MILLION CFU STRENGTH GRANULES at 13:23

## 2019-05-28 RX ADMIN — ACETAMINOPHEN 975 MG: 325 TABLET ORAL at 13:18

## 2019-05-28 RX ADMIN — LIDOCAINE HYDROCHLORIDE 50 MG: 10 INJECTION, SOLUTION INFILTRATION; PERINEURAL at 08:35

## 2019-05-28 RX ADMIN — METOPROLOL TARTRATE 25 MG: 25 TABLET ORAL at 17:05

## 2019-05-28 RX ADMIN — GABAPENTIN 300 MG: 300 CAPSULE ORAL at 21:28

## 2019-05-28 RX ADMIN — SODIUM CHLORIDE, SODIUM LACTATE, POTASSIUM CHLORIDE, AND CALCIUM CHLORIDE: .6; .31; .03; .02 INJECTION, SOLUTION INTRAVENOUS at 08:20

## 2019-05-28 RX ADMIN — CEFAZOLIN SODIUM 2000 MG: 2 SOLUTION INTRAVENOUS at 08:31

## 2019-05-28 RX ADMIN — ACETAMINOPHEN 975 MG: 325 TABLET ORAL at 21:28

## 2019-05-28 RX ADMIN — CEFAZOLIN SODIUM 2000 MG: 2 SOLUTION INTRAVENOUS at 17:05

## 2019-05-28 RX ADMIN — HEPARIN SODIUM 5000 UNITS: 5000 INJECTION INTRAVENOUS; SUBCUTANEOUS at 21:28

## 2019-05-29 PROBLEM — R79.89 ABNORMAL TSH: Status: ACTIVE | Noted: 2019-05-29

## 2019-05-29 LAB
25(OH)D3 SERPL-MCNC: 32.7 NG/ML (ref 30–100)
ALBUMIN SERPL BCP-MCNC: 2.9 G/DL (ref 3.5–5)
ALP SERPL-CCNC: 49 U/L (ref 46–116)
ALT SERPL W P-5'-P-CCNC: 16 U/L (ref 12–78)
ANION GAP SERPL CALCULATED.3IONS-SCNC: 11 MMOL/L (ref 4–13)
AST SERPL W P-5'-P-CCNC: 14 U/L (ref 5–45)
BASOPHILS # BLD AUTO: 0.05 THOUSANDS/ΜL (ref 0–0.1)
BASOPHILS NFR BLD AUTO: 0 % (ref 0–1)
BILIRUB SERPL-MCNC: 0.29 MG/DL (ref 0.2–1)
BUN SERPL-MCNC: 26 MG/DL (ref 5–25)
CALCIUM SERPL-MCNC: 8.4 MG/DL (ref 8.3–10.1)
CHLORIDE SERPL-SCNC: 105 MMOL/L (ref 100–108)
CO2 SERPL-SCNC: 24 MMOL/L (ref 21–32)
CREAT SERPL-MCNC: 1.29 MG/DL (ref 0.6–1.3)
EOSINOPHIL # BLD AUTO: 0.01 THOUSAND/ΜL (ref 0–0.61)
EOSINOPHIL NFR BLD AUTO: 0 % (ref 0–6)
ERYTHROCYTE [DISTWIDTH] IN BLOOD BY AUTOMATED COUNT: 17.1 % (ref 11.6–15.1)
ERYTHROCYTE [SEDIMENTATION RATE] IN BLOOD: 79 MM/HOUR (ref 0–10)
GFR SERPL CREATININE-BSD FRML MDRD: 48 ML/MIN/1.73SQ M
GLUCOSE SERPL-MCNC: 141 MG/DL (ref 65–140)
HCT VFR BLD AUTO: 26 % (ref 36.5–49.3)
HGB BLD-MCNC: 8.1 G/DL (ref 12–17)
IMM GRANULOCYTES # BLD AUTO: 0.08 THOUSAND/UL (ref 0–0.2)
IMM GRANULOCYTES NFR BLD AUTO: 1 % (ref 0–2)
LYMPHOCYTES # BLD AUTO: 0.58 THOUSANDS/ΜL (ref 0.6–4.47)
LYMPHOCYTES NFR BLD AUTO: 5 % (ref 14–44)
MCH RBC QN AUTO: 28.4 PG (ref 26.8–34.3)
MCHC RBC AUTO-ENTMCNC: 31.2 G/DL (ref 31.4–37.4)
MCV RBC AUTO: 91 FL (ref 82–98)
MONOCYTES # BLD AUTO: 1.85 THOUSAND/ΜL (ref 0.17–1.22)
MONOCYTES NFR BLD AUTO: 15 % (ref 4–12)
NEUTROPHILS # BLD AUTO: 9.69 THOUSANDS/ΜL (ref 1.85–7.62)
NEUTS SEG NFR BLD AUTO: 79 % (ref 43–75)
NRBC BLD AUTO-RTO: 0 /100 WBCS
PLATELET # BLD AUTO: 303 THOUSANDS/UL (ref 149–390)
PMV BLD AUTO: 10.9 FL (ref 8.9–12.7)
POTASSIUM SERPL-SCNC: 4.4 MMOL/L (ref 3.5–5.3)
PROT SERPL-MCNC: 6.3 G/DL (ref 6.4–8.2)
PTH-INTACT SERPL-MCNC: 57.5 PG/ML (ref 18.4–80.1)
RBC # BLD AUTO: 2.85 MILLION/UL (ref 3.88–5.62)
SODIUM SERPL-SCNC: 140 MMOL/L (ref 136–145)
T4 FREE SERPL-MCNC: 1.61 NG/DL (ref 0.76–1.46)
TSH SERPL DL<=0.05 MIU/L-ACNC: 0.25 UIU/ML (ref 0.36–3.74)
WBC # BLD AUTO: 12.26 THOUSAND/UL (ref 4.31–10.16)

## 2019-05-29 PROCEDURE — G8987 SELF CARE CURRENT STATUS: HCPCS

## 2019-05-29 PROCEDURE — 80053 COMPREHEN METABOLIC PANEL: CPT | Performed by: ORTHOPAEDIC SURGERY

## 2019-05-29 PROCEDURE — 97163 PT EVAL HIGH COMPLEX 45 MIN: CPT

## 2019-05-29 PROCEDURE — NS001 PR NO SIGNATURE OR ATTESTATION: Performed by: ORTHOPAEDIC SURGERY

## 2019-05-29 PROCEDURE — 82306 VITAMIN D 25 HYDROXY: CPT | Performed by: ORTHOPAEDIC SURGERY

## 2019-05-29 PROCEDURE — G8988 SELF CARE GOAL STATUS: HCPCS

## 2019-05-29 PROCEDURE — 84439 ASSAY OF FREE THYROXINE: CPT | Performed by: NURSE PRACTITIONER

## 2019-05-29 PROCEDURE — 99232 SBSQ HOSP IP/OBS MODERATE 35: CPT | Performed by: NURSE PRACTITIONER

## 2019-05-29 PROCEDURE — 83970 ASSAY OF PARATHORMONE: CPT | Performed by: ORTHOPAEDIC SURGERY

## 2019-05-29 PROCEDURE — 85025 COMPLETE CBC W/AUTO DIFF WBC: CPT | Performed by: NURSE PRACTITIONER

## 2019-05-29 PROCEDURE — G8978 MOBILITY CURRENT STATUS: HCPCS

## 2019-05-29 PROCEDURE — G8979 MOBILITY GOAL STATUS: HCPCS

## 2019-05-29 PROCEDURE — 97167 OT EVAL HIGH COMPLEX 60 MIN: CPT

## 2019-05-29 PROCEDURE — 85652 RBC SED RATE AUTOMATED: CPT | Performed by: ORTHOPAEDIC SURGERY

## 2019-05-29 PROCEDURE — 84443 ASSAY THYROID STIM HORMONE: CPT | Performed by: ORTHOPAEDIC SURGERY

## 2019-05-29 PROCEDURE — 84165 PROTEIN E-PHORESIS SERUM: CPT | Performed by: INTERNAL MEDICINE

## 2019-05-29 RX ADMIN — OXYBUTYNIN CHLORIDE 5 MG: 5 TABLET, EXTENDED RELEASE ORAL at 08:50

## 2019-05-29 RX ADMIN — ACETAMINOPHEN 975 MG: 325 TABLET ORAL at 13:12

## 2019-05-29 RX ADMIN — LACTOBACILLUS ACIDOPHILUS / LACTOBACILLUS BULGARICUS 1 PACKET: 100 MILLION CFU STRENGTH GRANULES at 12:07

## 2019-05-29 RX ADMIN — ACETAMINOPHEN 975 MG: 325 TABLET ORAL at 05:16

## 2019-05-29 RX ADMIN — ASPIRIN 81 MG 81 MG: 81 TABLET ORAL at 08:50

## 2019-05-29 RX ADMIN — HEPARIN SODIUM 5000 UNITS: 5000 INJECTION INTRAVENOUS; SUBCUTANEOUS at 05:16

## 2019-05-29 RX ADMIN — METOPROLOL TARTRATE 25 MG: 25 TABLET ORAL at 08:50

## 2019-05-29 RX ADMIN — ATORVASTATIN CALCIUM 40 MG: 40 TABLET, FILM COATED ORAL at 17:35

## 2019-05-29 RX ADMIN — CEFAZOLIN SODIUM 2000 MG: 2 SOLUTION INTRAVENOUS at 00:58

## 2019-05-29 RX ADMIN — LACTOBACILLUS ACIDOPHILUS / LACTOBACILLUS BULGARICUS 1 PACKET: 100 MILLION CFU STRENGTH GRANULES at 17:35

## 2019-05-29 RX ADMIN — SERTRALINE HYDROCHLORIDE 25 MG: 25 TABLET ORAL at 08:50

## 2019-05-29 RX ADMIN — METOPROLOL TARTRATE 25 MG: 25 TABLET ORAL at 17:35

## 2019-05-29 RX ADMIN — ACETAMINOPHEN 975 MG: 325 TABLET ORAL at 21:41

## 2019-05-29 RX ADMIN — GABAPENTIN 300 MG: 300 CAPSULE ORAL at 21:41

## 2019-05-29 RX ADMIN — HEPARIN SODIUM 5000 UNITS: 5000 INJECTION INTRAVENOUS; SUBCUTANEOUS at 21:41

## 2019-05-29 RX ADMIN — Medication 1 TABLET: at 08:50

## 2019-05-29 RX ADMIN — AMLODIPINE BESYLATE 5 MG: 5 TABLET ORAL at 08:50

## 2019-05-29 RX ADMIN — OXYCODONE HYDROCHLORIDE AND ACETAMINOPHEN 500 MG: 500 TABLET ORAL at 08:50

## 2019-05-29 RX ADMIN — HEPARIN SODIUM 5000 UNITS: 5000 INJECTION INTRAVENOUS; SUBCUTANEOUS at 13:14

## 2019-05-29 RX ADMIN — LACTOBACILLUS ACIDOPHILUS / LACTOBACILLUS BULGARICUS 1 PACKET: 100 MILLION CFU STRENGTH GRANULES at 08:50

## 2019-05-29 RX ADMIN — PREDNISONE 2.5 MG: 2.5 TABLET ORAL at 08:51

## 2019-05-30 VITALS
TEMPERATURE: 98.2 F | HEART RATE: 68 BPM | DIASTOLIC BLOOD PRESSURE: 69 MMHG | BODY MASS INDEX: 24.86 KG/M2 | RESPIRATION RATE: 18 BRPM | HEIGHT: 68 IN | OXYGEN SATURATION: 96 % | SYSTOLIC BLOOD PRESSURE: 135 MMHG | WEIGHT: 164.02 LBS

## 2019-05-30 LAB
ALBUMIN SERPL ELPH-MCNC: 2.86 G/DL (ref 3.5–5)
ALBUMIN SERPL ELPH-MCNC: 50.2 % (ref 52–65)
ALPHA1 GLOB SERPL ELPH-MCNC: 0.46 G/DL (ref 0.1–0.4)
ALPHA1 GLOB SERPL ELPH-MCNC: 8 % (ref 2.5–5)
ALPHA2 GLOB SERPL ELPH-MCNC: 0.84 G/DL (ref 0.4–1.2)
ALPHA2 GLOB SERPL ELPH-MCNC: 14.7 % (ref 7–13)
BASOPHILS # BLD AUTO: 0.12 THOUSANDS/ΜL (ref 0–0.1)
BASOPHILS NFR BLD AUTO: 1 % (ref 0–1)
BETA GLOB ABNORMAL SERPL ELPH-MCNC: 0.37 G/DL (ref 0.4–0.8)
BETA1 GLOB SERPL ELPH-MCNC: 6.5 % (ref 5–13)
BETA2 GLOB SERPL ELPH-MCNC: 6.3 % (ref 2–8)
BETA2+GAMMA GLOB SERPL ELPH-MCNC: 0.36 G/DL (ref 0.2–0.5)
EOSINOPHIL # BLD AUTO: 0.52 THOUSAND/ΜL (ref 0–0.61)
EOSINOPHIL NFR BLD AUTO: 4 % (ref 0–6)
ERYTHROCYTE [DISTWIDTH] IN BLOOD BY AUTOMATED COUNT: 17.2 % (ref 11.6–15.1)
GAMMA GLOB ABNORMAL SERPL ELPH-MCNC: 0.82 G/DL (ref 0.5–1.6)
GAMMA GLOB SERPL ELPH-MCNC: 14.3 % (ref 12–22)
HCT VFR BLD AUTO: 26.2 % (ref 36.5–49.3)
HGB BLD-MCNC: 8.1 G/DL (ref 12–17)
IGG/ALB SER: 1.01 {RATIO} (ref 1.1–1.8)
IMM GRANULOCYTES # BLD AUTO: 0.06 THOUSAND/UL (ref 0–0.2)
IMM GRANULOCYTES NFR BLD AUTO: 1 % (ref 0–2)
LYMPHOCYTES # BLD AUTO: 1.05 THOUSANDS/ΜL (ref 0.6–4.47)
LYMPHOCYTES NFR BLD AUTO: 9 % (ref 14–44)
MCH RBC QN AUTO: 28.2 PG (ref 26.8–34.3)
MCHC RBC AUTO-ENTMCNC: 30.9 G/DL (ref 31.4–37.4)
MCV RBC AUTO: 91 FL (ref 82–98)
MONOCYTES # BLD AUTO: 1.61 THOUSAND/ΜL (ref 0.17–1.22)
MONOCYTES NFR BLD AUTO: 13 % (ref 4–12)
NEUTROPHILS # BLD AUTO: 8.74 THOUSANDS/ΜL (ref 1.85–7.62)
NEUTS SEG NFR BLD AUTO: 72 % (ref 43–75)
NRBC BLD AUTO-RTO: 0 /100 WBCS
PLATELET # BLD AUTO: 317 THOUSANDS/UL (ref 149–390)
PMV BLD AUTO: 11.2 FL (ref 8.9–12.7)
PROT PATTERN SERPL ELPH-IMP: ABNORMAL
PROT SERPL-MCNC: 5.7 G/DL (ref 6.4–8.2)
RBC # BLD AUTO: 2.87 MILLION/UL (ref 3.88–5.62)
WBC # BLD AUTO: 12.1 THOUSAND/UL (ref 4.31–10.16)

## 2019-05-30 PROCEDURE — 85025 COMPLETE CBC W/AUTO DIFF WBC: CPT | Performed by: NURSE PRACTITIONER

## 2019-05-30 PROCEDURE — 99239 HOSP IP/OBS DSCHRG MGMT >30: CPT | Performed by: NURSE PRACTITIONER

## 2019-05-30 PROCEDURE — NS001 PR NO SIGNATURE OR ATTESTATION: Performed by: ORTHOPAEDIC SURGERY

## 2019-05-30 RX ORDER — DOCUSATE SODIUM 100 MG/1
100 CAPSULE, LIQUID FILLED ORAL 2 TIMES DAILY
Qty: 10 CAPSULE | Refills: 0 | Status: SHIPPED | OUTPATIENT
Start: 2019-05-30

## 2019-05-30 RX ORDER — SENNOSIDES 8.6 MG
1 TABLET ORAL
Status: DISCONTINUED | OUTPATIENT
Start: 2019-05-30 | End: 2019-05-30 | Stop reason: HOSPADM

## 2019-05-30 RX ORDER — POLYETHYLENE GLYCOL 3350 17 G/17G
17 POWDER, FOR SOLUTION ORAL DAILY
Qty: 14 EACH | Refills: 0 | Status: ON HOLD | OUTPATIENT
Start: 2019-05-31 | End: 2019-07-02 | Stop reason: SDUPTHER

## 2019-05-30 RX ORDER — ACETAMINOPHEN 325 MG/1
975 TABLET ORAL EVERY 8 HOURS SCHEDULED
Qty: 30 TABLET | Refills: 0 | Status: SHIPPED | OUTPATIENT
Start: 2019-05-30 | End: 2019-06-13

## 2019-05-30 RX ORDER — SENNOSIDES 8.6 MG
1 TABLET ORAL
Qty: 120 EACH | Refills: 0 | Status: SHIPPED | OUTPATIENT
Start: 2019-05-30

## 2019-05-30 RX ORDER — POLYETHYLENE GLYCOL 3350 17 G/17G
17 POWDER, FOR SOLUTION ORAL DAILY
Status: DISCONTINUED | OUTPATIENT
Start: 2019-05-30 | End: 2019-05-30 | Stop reason: HOSPADM

## 2019-05-30 RX ORDER — DOCUSATE SODIUM 100 MG/1
100 CAPSULE, LIQUID FILLED ORAL 2 TIMES DAILY
Status: DISCONTINUED | OUTPATIENT
Start: 2019-05-30 | End: 2019-05-30 | Stop reason: HOSPADM

## 2019-05-30 RX ADMIN — ACETAMINOPHEN 975 MG: 325 TABLET ORAL at 05:18

## 2019-05-30 RX ADMIN — Medication 1 TABLET: at 09:01

## 2019-05-30 RX ADMIN — METOPROLOL TARTRATE 25 MG: 25 TABLET ORAL at 09:01

## 2019-05-30 RX ADMIN — LACTOBACILLUS ACIDOPHILUS / LACTOBACILLUS BULGARICUS 1 PACKET: 100 MILLION CFU STRENGTH GRANULES at 12:59

## 2019-05-30 RX ADMIN — DOCUSATE SODIUM 100 MG: 100 CAPSULE, LIQUID FILLED ORAL at 11:40

## 2019-05-30 RX ADMIN — SERTRALINE HYDROCHLORIDE 25 MG: 25 TABLET ORAL at 09:01

## 2019-05-30 RX ADMIN — AMLODIPINE BESYLATE 5 MG: 5 TABLET ORAL at 09:01

## 2019-05-30 RX ADMIN — PREDNISONE 2.5 MG: 2.5 TABLET ORAL at 09:03

## 2019-05-30 RX ADMIN — HEPARIN SODIUM 5000 UNITS: 5000 INJECTION INTRAVENOUS; SUBCUTANEOUS at 05:18

## 2019-05-30 RX ADMIN — LACTOBACILLUS ACIDOPHILUS / LACTOBACILLUS BULGARICUS 1 PACKET: 100 MILLION CFU STRENGTH GRANULES at 09:02

## 2019-05-30 RX ADMIN — OXYBUTYNIN CHLORIDE 5 MG: 5 TABLET, EXTENDED RELEASE ORAL at 09:01

## 2019-05-30 RX ADMIN — POLYETHYLENE GLYCOL 3350 17 G: 17 POWDER, FOR SOLUTION ORAL at 11:40

## 2019-05-30 RX ADMIN — ASPIRIN 81 MG 81 MG: 81 TABLET ORAL at 09:01

## 2019-05-30 RX ADMIN — OXYCODONE HYDROCHLORIDE AND ACETAMINOPHEN 500 MG: 500 TABLET ORAL at 09:01

## 2019-06-03 ENCOUNTER — PATIENT OUTREACH (OUTPATIENT)
Dept: CASE MANAGEMENT | Facility: HOSPITAL | Age: 84
End: 2019-06-03

## 2019-06-06 ENCOUNTER — EPISODE CHANGES (OUTPATIENT)
Dept: CASE MANAGEMENT | Facility: HOSPITAL | Age: 84
End: 2019-06-06

## 2019-06-07 ENCOUNTER — PATIENT OUTREACH (OUTPATIENT)
Dept: CASE MANAGEMENT | Facility: HOSPITAL | Age: 84
End: 2019-06-07

## 2019-06-13 ENCOUNTER — OFFICE VISIT (OUTPATIENT)
Dept: OBGYN CLINIC | Facility: OTHER | Age: 84
End: 2019-06-13

## 2019-06-13 ENCOUNTER — TELEPHONE (OUTPATIENT)
Dept: OBGYN CLINIC | Facility: HOSPITAL | Age: 84
End: 2019-06-13

## 2019-06-13 ENCOUNTER — APPOINTMENT (OUTPATIENT)
Dept: RADIOLOGY | Facility: OTHER | Age: 84
End: 2019-06-13
Payer: COMMERCIAL

## 2019-06-13 VITALS
DIASTOLIC BLOOD PRESSURE: 67 MMHG | HEIGHT: 68 IN | SYSTOLIC BLOOD PRESSURE: 135 MMHG | BODY MASS INDEX: 24.94 KG/M2 | HEART RATE: 67 BPM

## 2019-06-13 DIAGNOSIS — M25.551 PAIN IN RIGHT HIP: Primary | ICD-10-CM

## 2019-06-13 DIAGNOSIS — M25.551 PAIN IN RIGHT HIP: ICD-10-CM

## 2019-06-13 PROCEDURE — 99024 POSTOP FOLLOW-UP VISIT: CPT | Performed by: ORTHOPAEDIC SURGERY

## 2019-06-13 PROCEDURE — 1124F ACP DISCUSS-NO DSCNMKR DOCD: CPT | Performed by: ORTHOPAEDIC SURGERY

## 2019-06-13 PROCEDURE — 73502 X-RAY EXAM HIP UNI 2-3 VIEWS: CPT

## 2019-06-13 RX ORDER — OXYBUTYNIN CHLORIDE 5 MG/1
5 TABLET ORAL 3 TIMES DAILY
COMMUNITY

## 2019-06-23 ENCOUNTER — HOSPITAL ENCOUNTER (INPATIENT)
Facility: HOSPITAL | Age: 84
LOS: 8 days | Discharge: HOME HEALTH | DRG: 813 | End: 2019-07-02
Attending: EMERGENCY MEDICINE | Admitting: FAMILY MEDICINE
Payer: MEDICARE

## 2019-06-23 ENCOUNTER — APPOINTMENT (OUTPATIENT)
Dept: RADIOLOGY | Facility: HOSPITAL | Age: 84
DRG: 813 | End: 2019-06-23
Payer: MEDICARE

## 2019-06-23 DIAGNOSIS — G60.9 IDIOPATHIC PERIPHERAL NEUROPATHY: Chronic | ICD-10-CM

## 2019-06-23 DIAGNOSIS — N18.30 CKD (CHRONIC KIDNEY DISEASE) STAGE 3, GFR 30-59 ML/MIN (HCC): ICD-10-CM

## 2019-06-23 DIAGNOSIS — M79.672 PAIN IN BOTH FEET: ICD-10-CM

## 2019-06-23 DIAGNOSIS — N39.0 UTI (URINARY TRACT INFECTION): ICD-10-CM

## 2019-06-23 DIAGNOSIS — K92.2 GI BLEED: Primary | ICD-10-CM

## 2019-06-23 DIAGNOSIS — D62 ACUTE POST-HEMORRHAGIC ANEMIA: ICD-10-CM

## 2019-06-23 DIAGNOSIS — S72.141A DISPLACED INTERTROCHANTERIC FRACTURE OF RIGHT FEMUR, INITIAL ENCOUNTER FOR CLOSED FRACTURE (HCC): ICD-10-CM

## 2019-06-23 DIAGNOSIS — M79.671 PAIN IN BOTH FEET: ICD-10-CM

## 2019-06-23 DIAGNOSIS — Z71.89 GOALS OF CARE, COUNSELING/DISCUSSION: ICD-10-CM

## 2019-06-23 DIAGNOSIS — D64.9 CHRONIC ANEMIA: ICD-10-CM

## 2019-06-23 DIAGNOSIS — R33.9 URINARY RETENTION: ICD-10-CM

## 2019-06-23 LAB
ALBUMIN SERPL BCP-MCNC: 2.9 G/DL (ref 3.5–5)
ALP SERPL-CCNC: 97 U/L (ref 46–116)
ALT SERPL W P-5'-P-CCNC: 23 U/L (ref 12–78)
ANION GAP BLD CALC-SCNC: 14 MMOL/L (ref 4–13)
ANION GAP SERPL CALCULATED.3IONS-SCNC: 7 MMOL/L (ref 4–13)
AST SERPL W P-5'-P-CCNC: 16 U/L (ref 5–45)
BASOPHILS # BLD AUTO: 0.1 THOUSANDS/ΜL (ref 0–0.1)
BASOPHILS NFR BLD AUTO: 1 % (ref 0–1)
BILIRUB SERPL-MCNC: 0.46 MG/DL (ref 0.2–1)
BUN BLD-MCNC: 38 MG/DL (ref 5–25)
BUN SERPL-MCNC: 27 MG/DL (ref 5–25)
CA-I BLD-SCNC: 1.08 MMOL/L (ref 1.12–1.32)
CALCIUM SERPL-MCNC: 8.6 MG/DL (ref 8.3–10.1)
CHLORIDE BLD-SCNC: 110 MMOL/L (ref 100–108)
CHLORIDE SERPL-SCNC: 110 MMOL/L (ref 100–108)
CO2 SERPL-SCNC: 23 MMOL/L (ref 21–32)
CREAT BLD-MCNC: 1.4 MG/DL (ref 0.6–1.3)
CREAT SERPL-MCNC: 1.34 MG/DL (ref 0.6–1.3)
EOSINOPHIL # BLD AUTO: 0.35 THOUSAND/ΜL (ref 0–0.61)
EOSINOPHIL NFR BLD AUTO: 3 % (ref 0–6)
ERYTHROCYTE [DISTWIDTH] IN BLOOD BY AUTOMATED COUNT: 18.2 % (ref 11.6–15.1)
GFR SERPL CREATININE-BSD FRML MDRD: 44 ML/MIN/1.73SQ M
GFR SERPL CREATININE-BSD FRML MDRD: 46 ML/MIN/1.73SQ M
GLUCOSE P FAST SERPL-MCNC: 139 MG/DL (ref 65–99)
GLUCOSE SERPL-MCNC: 139 MG/DL (ref 65–140)
GLUCOSE SERPL-MCNC: 157 MG/DL (ref 65–140)
HCT VFR BLD AUTO: 30.5 % (ref 36.5–49.3)
HCT VFR BLD CALC: 28 % (ref 36.5–49.3)
HGB BLD-MCNC: 7.7 G/DL (ref 12–17)
HGB BLD-MCNC: 8.8 G/DL (ref 12–17)
HGB BLDA-MCNC: 9.5 G/DL (ref 12–17)
IMM GRANULOCYTES # BLD AUTO: 0.06 THOUSAND/UL (ref 0–0.2)
IMM GRANULOCYTES NFR BLD AUTO: 1 % (ref 0–2)
LACTATE SERPL-SCNC: 2.1 MMOL/L (ref 0.5–2)
LYMPHOCYTES # BLD AUTO: 0.77 THOUSANDS/ΜL (ref 0.6–4.47)
LYMPHOCYTES NFR BLD AUTO: 7 % (ref 14–44)
MCH RBC QN AUTO: 27.8 PG (ref 26.8–34.3)
MCHC RBC AUTO-ENTMCNC: 28.9 G/DL (ref 31.4–37.4)
MCV RBC AUTO: 97 FL (ref 82–98)
MONOCYTES # BLD AUTO: 1.35 THOUSAND/ΜL (ref 0.17–1.22)
MONOCYTES NFR BLD AUTO: 12 % (ref 4–12)
NEUTROPHILS # BLD AUTO: 8.72 THOUSANDS/ΜL (ref 1.85–7.62)
NEUTS SEG NFR BLD AUTO: 76 % (ref 43–75)
NRBC BLD AUTO-RTO: 0 /100 WBCS
PCO2 BLD: 22 MMOL/L (ref 21–32)
PLATELET # BLD AUTO: 858 THOUSANDS/UL (ref 149–390)
PMV BLD AUTO: 9.8 FL (ref 8.9–12.7)
POTASSIUM BLD-SCNC: 6.2 MMOL/L (ref 3.5–5.3)
POTASSIUM SERPL-SCNC: 4.5 MMOL/L (ref 3.5–5.3)
PROT SERPL-MCNC: 7.1 G/DL (ref 6.4–8.2)
RBC # BLD AUTO: 3.16 MILLION/UL (ref 3.88–5.62)
SODIUM BLD-SCNC: 139 MMOL/L (ref 136–145)
SODIUM SERPL-SCNC: 140 MMOL/L (ref 136–145)
SPECIMEN SOURCE: ABNORMAL
WBC # BLD AUTO: 11.35 THOUSAND/UL (ref 4.31–10.16)

## 2019-06-23 PROCEDURE — 82272 OCCULT BLD FECES 1-3 TESTS: CPT

## 2019-06-23 PROCEDURE — 36415 COLL VENOUS BLD VENIPUNCTURE: CPT | Performed by: EMERGENCY MEDICINE

## 2019-06-23 PROCEDURE — 99284 EMERGENCY DEPT VISIT MOD MDM: CPT | Performed by: EMERGENCY MEDICINE

## 2019-06-23 PROCEDURE — 85018 HEMOGLOBIN: CPT | Performed by: INTERNAL MEDICINE

## 2019-06-23 PROCEDURE — 85025 COMPLETE CBC W/AUTO DIFF WBC: CPT | Performed by: EMERGENCY MEDICINE

## 2019-06-23 PROCEDURE — 74176 CT ABD & PELVIS W/O CONTRAST: CPT

## 2019-06-23 PROCEDURE — 80053 COMPREHEN METABOLIC PANEL: CPT | Performed by: INTERNAL MEDICINE

## 2019-06-23 PROCEDURE — 99283 EMERGENCY DEPT VISIT LOW MDM: CPT | Performed by: INTERNAL MEDICINE

## 2019-06-23 PROCEDURE — 99220 PR INITIAL OBSERVATION CARE/DAY 70 MINUTES: CPT | Performed by: INTERNAL MEDICINE

## 2019-06-23 PROCEDURE — 85014 HEMATOCRIT: CPT

## 2019-06-23 PROCEDURE — 30233N1 TRANSFUSION OF NONAUTOLOGOUS RED BLOOD CELLS INTO PERIPHERAL VEIN, PERCUTANEOUS APPROACH: ICD-10-PCS | Performed by: EMERGENCY MEDICINE

## 2019-06-23 PROCEDURE — 93005 ELECTROCARDIOGRAM TRACING: CPT

## 2019-06-23 PROCEDURE — 99285 EMERGENCY DEPT VISIT HI MDM: CPT

## 2019-06-23 PROCEDURE — C9113 INJ PANTOPRAZOLE SODIUM, VIA: HCPCS | Performed by: INTERNAL MEDICINE

## 2019-06-23 PROCEDURE — 80047 BASIC METABLC PNL IONIZED CA: CPT

## 2019-06-23 PROCEDURE — 83605 ASSAY OF LACTIC ACID: CPT | Performed by: INTERNAL MEDICINE

## 2019-06-23 RX ORDER — SODIUM CHLORIDE 9 MG/ML
50 INJECTION, SOLUTION INTRAVENOUS CONTINUOUS
Status: DISPENSED | OUTPATIENT
Start: 2019-06-23 | End: 2019-06-23

## 2019-06-23 RX ORDER — OXYBUTYNIN CHLORIDE 5 MG/1
5 TABLET, EXTENDED RELEASE ORAL DAILY
Status: DISCONTINUED | OUTPATIENT
Start: 2019-06-24 | End: 2019-06-24

## 2019-06-23 RX ORDER — ACETAMINOPHEN 325 MG/1
650 TABLET ORAL EVERY 6 HOURS PRN
Status: DISCONTINUED | OUTPATIENT
Start: 2019-06-23 | End: 2019-06-28

## 2019-06-23 RX ORDER — PANTOPRAZOLE SODIUM 40 MG/1
40 INJECTION, POWDER, FOR SOLUTION INTRAVENOUS EVERY 12 HOURS SCHEDULED
Status: DISCONTINUED | OUTPATIENT
Start: 2019-06-23 | End: 2019-07-02 | Stop reason: HOSPADM

## 2019-06-23 RX ORDER — DOCUSATE SODIUM 100 MG/1
100 CAPSULE, LIQUID FILLED ORAL 2 TIMES DAILY
Status: DISCONTINUED | OUTPATIENT
Start: 2019-06-23 | End: 2019-07-02 | Stop reason: HOSPADM

## 2019-06-23 RX ORDER — PREDNISONE 2.5 MG
2.5 TABLET ORAL DAILY
Status: DISCONTINUED | OUTPATIENT
Start: 2019-06-23 | End: 2019-07-02 | Stop reason: HOSPADM

## 2019-06-23 RX ORDER — OXYBUTYNIN CHLORIDE 5 MG/1
5 TABLET ORAL 3 TIMES DAILY
Status: DISCONTINUED | OUTPATIENT
Start: 2019-06-23 | End: 2019-06-24

## 2019-06-23 RX ORDER — SERTRALINE HYDROCHLORIDE 25 MG/1
25 TABLET, FILM COATED ORAL DAILY
COMMUNITY

## 2019-06-23 RX ORDER — SENNOSIDES 8.6 MG
1 TABLET ORAL
Status: DISCONTINUED | OUTPATIENT
Start: 2019-06-23 | End: 2019-06-26

## 2019-06-23 RX ORDER — FERROUS SULFATE 325(65) MG
325 TABLET ORAL
Status: DISCONTINUED | OUTPATIENT
Start: 2019-06-24 | End: 2019-07-02 | Stop reason: HOSPADM

## 2019-06-23 RX ORDER — SACCHAROMYCES BOULARDII 250 MG
250 CAPSULE ORAL 2 TIMES DAILY
Status: DISCONTINUED | OUTPATIENT
Start: 2019-06-23 | End: 2019-07-02 | Stop reason: HOSPADM

## 2019-06-23 RX ORDER — GABAPENTIN 300 MG/1
300 CAPSULE ORAL
Status: DISCONTINUED | OUTPATIENT
Start: 2019-06-23 | End: 2019-06-28

## 2019-06-23 RX ORDER — ATORVASTATIN CALCIUM 40 MG/1
40 TABLET, FILM COATED ORAL EVERY EVENING
Status: DISCONTINUED | OUTPATIENT
Start: 2019-06-23 | End: 2019-06-28

## 2019-06-23 RX ORDER — MULTIVIT WITH MINERALS/LUTEIN
250 TABLET ORAL DAILY
Status: DISCONTINUED | OUTPATIENT
Start: 2019-06-23 | End: 2019-06-28

## 2019-06-23 RX ORDER — POLYETHYLENE GLYCOL 3350 17 G/17G
17 POWDER, FOR SOLUTION ORAL DAILY
Status: DISCONTINUED | OUTPATIENT
Start: 2019-06-23 | End: 2019-06-27

## 2019-06-23 RX ORDER — FERROUS SULFATE 325(65) MG
325 TABLET ORAL
COMMUNITY

## 2019-06-23 RX ADMIN — GABAPENTIN 300 MG: 300 CAPSULE ORAL at 22:04

## 2019-06-23 RX ADMIN — SERTRALINE HYDROCHLORIDE 50 MG: 50 TABLET ORAL at 16:09

## 2019-06-23 RX ADMIN — OXYBUTYNIN CHLORIDE 5 MG: 5 TABLET ORAL at 22:04

## 2019-06-23 RX ADMIN — SENNOSIDES 8.6 MG: 8.6 TABLET, FILM COATED ORAL at 22:04

## 2019-06-23 RX ADMIN — PANTOPRAZOLE SODIUM 40 MG: 40 INJECTION, POWDER, FOR SOLUTION INTRAVENOUS at 22:04

## 2019-06-23 RX ADMIN — PREDNISONE 2.5 MG: 2.5 TABLET ORAL at 16:09

## 2019-06-23 RX ADMIN — SODIUM CHLORIDE 50 ML/HR: 0.9 INJECTION, SOLUTION INTRAVENOUS at 14:51

## 2019-06-23 RX ADMIN — Medication 250 MG: at 16:09

## 2019-06-23 RX ADMIN — OXYBUTYNIN CHLORIDE 5 MG: 5 TABLET ORAL at 16:09

## 2019-06-23 RX ADMIN — ACETAMINOPHEN 650 MG: 325 TABLET ORAL at 22:04

## 2019-06-23 NOTE — ED PROVIDER NOTES
History  Chief Complaint   Patient presents with    GI Bleeding     EMS called to home due to pt having "a large amount of bloody stool"  Pt c/o some SOB  80-year-old male presenting for evaluation of GI bleed  Patient is on Lovenox for recent right hip surgery  Patient this morning reportedly had large bright red bowel movement per rectum, visualized by EMS  Patient denies any abdominal pain nausea vomiting chest pain shortness of breath or any other symptoms at this time  Patient found to have a large black melanotic stool in his diaper on rolling  Patient recent hemoglobin was 8 3 few weeks ago at times surgery  Patient has pain with manipulation of his right hip but otherwise normal examination  Patient has a history of GI bleeds in the past which were found to be due to diverticul  History provided by:  Patient   used: No    Black or Bloody Stool   Quality:  Black and tarry and bright red  Amount: Moderate  Timing:  Constant  Similar prior episodes: yes    Relieved by:  None tried  Worsened by:  Nothing  Ineffective treatments:  None tried  Associated symptoms: no abdominal pain, no fever, no hematemesis, no recent illness and no vomiting    Risk factors: anticoagulant use        Prior to Admission Medications   Prescriptions Last Dose Informant Patient Reported? Taking?    Ascorbic Acid (VITAMIN C) 1000 MG tablet   Yes Yes   Sig: Take by mouth daily    Lactobacillus (PROBIATA PO)   Yes No   Sig: Take by mouth daily   Multiple Vitamins-Minerals (CENTRUM SILVER PO)   Yes Yes   Sig: Take by mouth daily   NON FORMULARY   Yes No   Sig: Vinpocetine 30mg daily in PM   amLODIPine (NORVASC) 5 mg tablet   No Yes   Sig: Take 1 tablet (5 mg total) by mouth daily   aspirin 81 mg chewable tablet   No Yes   Sig: Chew 1 tablet (81 mg total) daily   atorvastatin (LIPITOR) 40 mg tablet   No Yes   Sig: Take 1 tablet (40 mg total) by mouth every evening   docusate sodium (COLACE) 100 mg capsule   No Yes   Sig: Take 1 capsule (100 mg total) by mouth 2 (two) times a day   enoxaparin (LOVENOX) 40 mg/0 4 mL   No Yes   Sig: Inject 0 4 mL (40 mg total) under the skin daily for 28 days   Patient taking differently: Inject 40 mg under the skin daily    ferrous sulfate 325 (65 Fe) mg tablet   Yes Yes   Sig: Take 325 mg by mouth daily with breakfast   gabapentin (NEURONTIN) 300 mg capsule   No Yes   Sig: Take 1 capsule (300 mg total) by mouth daily at bedtime   metoprolol tartrate (LOPRESSOR) 50 mg tablet   No Yes   Sig: Take 0 5 tablets (25 mg total) by mouth 2 (two) times a day   oxybutynin (DITROPAN) 5 mg tablet  Self Yes No   Sig: Take 5 mg by mouth 3 (three) times a day   polyethylene glycol (MIRALAX) 17 g packet   No Yes   Sig: Take 17 g by mouth daily   predniSONE 2 5 mg tablet   Yes Yes   Sig: Take by mouth   senna (SENOKOT) 8 6 mg   No Yes   Sig: Take 1 tablet (8 6 mg total) by mouth daily at bedtime   sertraline (ZOLOFT) 25 mg tablet   Yes Yes   Sig: Take 50 mg by mouth daily   tolterodine (DETROL LA) 2 mg 24 hr capsule   No No   Sig: TAKE 1 CAPSULE BY MOUTH ONCE DAILY      Facility-Administered Medications: None       Past Medical History:   Diagnosis Date    Abnormal blood chemistry     resolved: 5/13/2017    Accidental puncture during procedure on organ 04/27/2011    accidental puncture/laceration during a procedure    Adenocarcinoma of prostate ([facility] [address] ) 11/02/2004    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    rheumatoid arthritis    Bilateral edema of lower extremity     last assessed-7/1/2015    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    Coronary artery disease     Dementia     Gait dyspraxia     last assessed-1/26/2017    Gastrointestinal hemorrhage     last assessed-2/15/2017    Hematochezia     resolved: 2/15/2017    Herpes zoster 12/07/2011    History of degenerative disc disease     resolved: 5/13/2017    History of methicillin resistant staphylococcus aureus (MRSA) nasal swab negative 5/15/2019-Contact isolation discontinued per protocol    Hyperlipidemia     Hypertension     Incontinence of feces     last assessed-2016    Inflamed seborrheic keratosis 2006    Neoplasm of bone 2007    Organic impotence 2004    Osteoarthritis     resolved: 10/1/2017    [facility])     Paronychia of toe     last assessed-2013-unspecified laterality    Rectal bleeding     resolved-2017    Rheumatoid arthritis involving both hands ([facility] [address] )     Sigmoid diverticulitis     last assessed-2017    Squamous cell carcinoma of cheek     resolved-2017    Tinea corporis 2008    Transient disorder of initiating or maintaining sleep 2010    Vertigo, benign paroxysmal, unspecified laterality 2012    Xerosis cutis     last assessed-2013       Past Surgical History:   Procedure Laterality Date    COLONOSCOPY N/A 2017    Procedure: COLONOSCOPY;  Surgeon: Yoselin Levi MD;  Location: [place] [address] GI LAB; Service:     COLONOSCOPY N/A 2017    Procedure: COLONOSCOPY;  Surgeon: Yoselin Levi MD;  Location: [place] [address] GI LAB; Service:     COLONOSCOPY N/A 2017    Procedure: COLONOSCOPY;  Surgeon: Juan Muñiz MD;  Location: [place] [address] GI LAB; Service: Gastroenterology    CORONARY ARTERY BYPASS GRAFT      IA OPEN RX FEMUR FX+INTRAMED DELON Right 2019    Procedure: INSERTION NAIL IM FEMUR ANTEGRADE (TROCHANTERIC), RIGHT;  Surgeon: Jacquelin Laughlin MD;  Location:  MAIN OR;  Service: Orthopedics       Family History   Problem Relation Age of Onset    Stroke Mother     Hypertension Father     Stroke Father      I have reviewed and agree with the history as documented      Social History     Tobacco Use    Smoking status: Former Smoker     Packs/day: 1 00     Years: 30 00     Pack years: 30 00     Types: Cigarettes     Last attempt to quit: 1955     Years since quittin 4    Smokeless tobacco: Never Used   Substance Use Topics    Alcohol use: Not Currently    Drug use: Not Currently        Review of Systems   Constitutional: Negative for chills, fatigue and fever  HENT: Negative for sore throat  Eyes: Negative for visual disturbance  Respiratory: Negative for shortness of breath  Cardiovascular: Negative for chest pain  Gastrointestinal: Positive for blood in stool  Negative for abdominal pain, constipation, diarrhea, hematemesis, nausea and vomiting  Genitourinary: Negative for difficulty urinating, dysuria and hematuria  Musculoskeletal: Positive for arthralgias  Skin: Negative for rash  Neurological: Negative for syncope, weakness and headaches  Hematological: Negative for adenopathy  Psychiatric/Behavioral: Negative for agitation and behavioral problems  All other systems reviewed and are negative  Physical Exam  ED Triage Vitals [06/23/19 1151]   Temperature Pulse Respirations Blood Pressure SpO2   97 5 °F (36 4 °C) 97 22 128/58 97 %      Temp Source Heart Rate Source Patient Position - Orthostatic VS BP Location FiO2 (%)   Oral Monitor Sitting Left arm --      Pain Score       --             Orthostatic Vital Signs  Vitals:    06/23/19 1151 06/23/19 1302 06/23/19 1401 06/23/19 1700   BP: 128/58 151/67 (!) 178/81 136/59   Pulse: 97 90 87 100   Patient Position - Orthostatic VS: Sitting Lying Lying Lying       Physical Exam   Constitutional: He is oriented to person, place, and time  He appears well-developed and well-nourished  HENT:   Head: Normocephalic and atraumatic  Eyes: Conjunctivae and EOM are normal  No scleral icterus  Neck: Normal range of motion  Neck supple  Cardiovascular: Normal rate and regular rhythm  No murmur heard  Pulmonary/Chest: Effort normal and breath sounds normal    Abdominal: Soft  Bowel sounds are normal  There is no tenderness  Genitourinary: Rectal exam shows guaiac positive stool  Musculoskeletal: Normal range of motion   He exhibits tenderness  Neurological: He is alert and oriented to person, place, and time  Skin: Skin is warm and dry  Psychiatric: He has a normal mood and affect  His behavior is normal    Nursing note and vitals reviewed  ED Medications  Medications   ascorbic acid (VITAMIN C) tablet 250 mg (250 mg Oral Given 6/23/19 1609)   atorvastatin (LIPITOR) tablet 40 mg (40 mg Oral Refused 6/23/19 1730)   docusate sodium (COLACE) capsule 100 mg (100 mg Oral Refused 6/23/19 1749)   gabapentin (NEURONTIN) capsule 300 mg (has no administration in time range)   saccharomyces boulardii (FLORASTOR) capsule 250 mg (250 mg Oral Refused 6/23/19 1730)   metoprolol tartrate (LOPRESSOR) tablet 25 mg (25 mg Oral Refused 6/23/19 1733)   oxybutynin (DITROPAN) tablet 5 mg (5 mg Oral Given 6/23/19 1609)   polyethylene glycol (MIRALAX) packet 17 g (17 g Oral Refused 6/23/19 1749)   predniSONE tablet 2 5 mg (2 5 mg Oral Given 6/23/19 1609)   senna (SENOKOT) tablet 8 6 mg (has no administration in time range)   oxybutynin (DITROPAN-XL) 24 hr tablet 5 mg (has no administration in time range)   pantoprazole (PROTONIX) injection 40 mg (has no administration in time range)   sodium chloride 0 9 % infusion (50 mL/hr Intravenous New Bag 6/23/19 1451)   acetaminophen (TYLENOL) tablet 650 mg (has no administration in time range)   ferrous sulfate tablet 325 mg (has no administration in time range)   sertraline (ZOLOFT) tablet 50 mg (50 mg Oral Given 6/23/19 1609)       Diagnostic Studies  Results Reviewed     Procedure Component Value Units Date/Time    Lactic acid, plasma [[id number]]  (Abnormal) Collected:  06/23/19 1745    Lab Status:  Final result Specimen:  Blood from Arm, Left Updated:  06/23/19 1901     LACTIC ACID 2 1 mmol/L     Narrative:       Result may be elevated if tourniquet was used during collection      Hemoglobin [[id number]]  (Abnormal) Collected:  06/23/19 1745    Lab Status:  Final result Specimen:  Blood from Arm, Left Updated:  06/23/19 1754     Hemoglobin 7 7 g/dL     Comprehensive metabolic panel [[id number]]  (Abnormal) Collected:  06/23/19 1357    Lab Status:  Final result Specimen:  Blood from Arm, Right Updated:  06/23/19 1434     Sodium 140 mmol/L      Potassium 4 5 mmol/L      Chloride 110 mmol/L      CO2 23 mmol/L      ANION GAP 7 mmol/L      BUN 27 mg/dL      Creatinine 1 34 mg/dL      Glucose 139 mg/dL      Glucose, Fasting 139 mg/dL      Calcium 8 6 mg/dL      AST 16 U/L      ALT 23 U/L      Alkaline Phosphatase 97 U/L      Total Protein 7 1 g/dL      Albumin 2 9 g/dL      Total Bilirubin 0 46 mg/dL      eGFR 46 ml/min/1 73sq m     Narrative:       [place] guidelines for Chronic Kidney Disease (CKD):     Stage 1 with normal or high GFR (GFR > 90 mL/min/1 73 square meters)    Stage 2 Mild CKD (GFR = 60-89 mL/min/1 73 square meters)    Stage 3A Moderate CKD (GFR = 45-59 mL/min/1 73 square meters)    Stage 3B Moderate CKD (GFR = 30-44 mL/min/1 73 square meters)    Stage 4 Severe CKD (GFR = 15-29 mL/min/1 73 square meters)    Stage 5 End Stage CKD (GFR <15 mL/min/1 73 square meters)  Note: GFR calculation is accurate only with a steady state creatinine    CBC and differential [[id number]]  (Abnormal) Collected:  06/23/19 1155    Lab Status:  Final result Specimen:  Blood from Arm, Right Updated:  06/23/19 1205     WBC 11 35 Thousand/uL      RBC 3 16 Million/uL      Hemoglobin 8 8 g/dL      Hematocrit 30 5 %      MCV 97 fL      MCH 27 8 pg      MCHC 28 9 g/dL      RDW 18 2 %      MPV 9 8 fL      Platelets 828 Thousands/uL      nRBC 0 /100 WBCs      Neutrophils Relative 76 %      Immat GRANS % 1 %      Lymphocytes Relative 7 %      Monocytes Relative 12 %      Eosinophils Relative 3 %      Basophils Relative 1 %      Neutrophils Absolute 8 72 Thousands/µL      Immature Grans Absolute 0 06 Thousand/uL      Lymphocytes Absolute 0 77 Thousands/µL      Monocytes Absolute 1 35 Thousand/µL Eosinophils Absolute 0 35 Thousand/µL      Basophils Absolute 0 10 Thousands/µL     POCT Chem 8+ [[id number]]  (Abnormal) Collected:  06/23/19 1157    Lab Status:  Final result Specimen:  Venous Updated:  06/23/19 1202     SODIUM, I-STAT 139 mmol/l      Potassium, i-STAT 6 2 mmol/L      Chloride, istat 110 mmol/L      CO2, i-STAT 22 mmol/L      Anion Gap, i-STAT 14 mmol/L      Calcium, Ionized i-STAT 1 08 mmol/L      BUN, I-STAT 38 mg/dl      Creatinine, i-STAT 1 4 mg/dl      eGFR 44 ml/min/1 73sq m      Glucose, i-STAT 157 mg/dl      Hct, i-STAT 28 %      Hgb, i-STAT 9 5 g/dl      Specimen Type VENOUS    Narrative:       National Kid[employer] guidelines for Chronic Kidney Disease (CKD):     Stage 1 with normal or high GFR (GFR > 90 mL/min/1 73 square meters)    Stage 2 Mild CKD (GFR = 60-89 mL/min/1 73 square meters)    Stage 3A Moderate CKD (GFR = 45-59 mL/min/1 73 square meters)    Stage 3B Moderate CKD (GFR = 30-44 mL/min/1 73 square meters)    Stage 4 Severe CKD (GFR = 15-29 mL/min/1 73 square meters)    Stage 5 End Stage CKD (GFR <15 mL/min/1 73 square meters)  Note: GFR calculation is accurate only with a steady state creatinine                 CT abdomen pelvis wo contrast    (Results Pending)         Procedures  Procedures        ED Course  ED Course as of Jun 23 1905   Sun Jun 23, 2019   1203 Hemoglobin, Istat(!): 9 5   1214 Hemoglobin(!): 8 8           Identification of Seniors at Risk      Most Recent Value   (ISAR) Identification of Seniors at Risk   Before the illness or injury that brought you to the Emergency, did you need someone to help you on a regular basis? 0 Filed at: 06/23/2019 1153   In the last 24 hours, have you needed more help than usual?  1 Filed at: 06/23/2019 1153   Have you been hospitalized for one or more nights during the past 6 months?   1 Filed at: 06/23/2019 1153   In general, do you see well?  0 Filed at: 06/23/2019 1153   In general, do you have serious problems with your memory?    Do you take more than three different medications every day? 1 Filed at: 06/23/2019 1153   ISAR Score  3 Filed at: 06/23/2019 1153                          [facility]  Number of Diagnoses or Management Options  Chronic anemia: new and requires workup  GI bleed: new and requires workup  Diagnosis management comments: 77-year-old male presenting with bright red blood per rectum, will obtain basic laboratories to check hemoglobin and consent patient for blood transfusion  Patient's hemoglobin was 8 8 on repeat however he was actively having melanotic stools  Discussed patient with medicine team and will admit the patient, will continue to monitor and transfuse as necessary  Amount and/or Complexity of Data Reviewed  Clinical lab tests: ordered and reviewed  Tests in the medicine section of CPT®: ordered and reviewed  Review and summarize past medical records: yes  Discuss the patient with other providers: yes        Disposition  Final diagnoses:   GI bleed   Chronic anemia     Time reflects when diagnosis was documented in both MDM as applicable and the Disposition within this note     Time User Action Codes Description Comment    6/23/2019 12:23 PM Espinoza Batters Add [K92 2] GI bleed     6/23/2019 12:23 PM Espinoza Batters Add [D64 9] Chronic anemia     6/23/2019 12:31 PM Frostemanuel Searsg Modify [K92 2] GI bleed       ED Disposition     ED Disposition Condition Date/Time Comment    Admit Stable Sun Jun 23, 2019 12:23 PM Case was discussed with Louis and the patient's admission status was agreed to be Admission Status: inpatient status to the service of Dr Suki Crzu          Follow-up Information    None         Current Discharge Medication List      CONTINUE these medications which have NOT CHANGED    Details   amLODIPine (NORVASC) 5 mg tablet Take 1 tablet (5 mg total) by mouth daily  Qty: 30 tablet, Refills: 0    Associated Diagnoses: Benign essential hypertension      Ascorbic Acid (VITAMIN C) 1000 MG tablet Take by mouth daily       aspirin 81 mg chewable tablet Chew 1 tablet (81 mg total) daily  Qty: 30 tablet, Refills: 0    Associated Diagnoses: Disorientation      atorvastatin (LIPITOR) 40 mg tablet Take 1 tablet (40 mg total) by mouth every evening  Qty: 30 tablet, Refills: 0    Associated Diagnoses: Disorientation      docusate sodium (COLACE) 100 mg capsule Take 1 capsule (100 mg total) by mouth 2 (two) times a day  Qty: 10 capsule, Refills: 0    Associated Diagnoses: Displaced intertrochanteric fracture of right femur, initial encounter for closed fracture (HCC)      enoxaparin (LOVENOX) 40 mg/0 4 mL Inject 0 4 mL (40 mg total) under the skin daily for 28 days  Qty: 11 2 mL, Refills: 0    Associated Diagnoses: Displaced intertrochanteric fracture of right femur, initial encounter for closed fracture (HCC)      ferrous sulfate 325 (65 Fe) mg tablet Take 325 mg by mouth daily with breakfast      gabapentin (NEURONTIN) 300 mg capsule Take 1 capsule (300 mg total) by mouth daily at bedtime  Qty: 180 capsule, Refills: 0    Associated Diagnoses: Idiopathic peripheral neuropathy      metoprolol tartrate (LOPRESSOR) 50 mg tablet Take 0 5 tablets (25 mg total) by mouth 2 (two) times a day  Qty: 60 tablet, Refills: 0    Associated Diagnoses: Benign essential hypertension      Multiple Vitamins-Minerals (CENTRUM SILVER PO) Take by mouth daily      polyethylene glycol (MIRALAX) 17 g packet Take 17 g by mouth daily  Qty: 14 each, Refills: 0    Associated Diagnoses: Displaced intertrochanteric fracture of right femur, initial encounter for closed fracture (HCC)      predniSONE 2 5 mg tablet Take by mouth      senna (SENOKOT) 8 6 mg Take 1 tablet (8 6 mg total) by mouth daily at bedtime  Qty: 120 each, Refills: 0    Associated Diagnoses: Displaced intertrochanteric fracture of right femur, initial encounter for closed fracture (HCC)      sertraline (ZOLOFT) 25 mg tablet Take 50 mg by mouth daily Lactobacillus (PROBIATA PO) Take by mouth daily      NON FORMULARY Vinpocetine 30mg daily in PM      oxybutynin (DITROPAN) 5 mg tablet Take 5 mg by mouth 3 (three) times a day      tolterodine (DETROL LA) 2 mg 24 hr capsule TAKE 1 CAPSULE BY MOUTH ONCE DAILY  Qty: 90 capsule, Refills: 1    Associated Diagnoses: Overactive bladder           No discharge procedures on file  ED Provider  Attending physically available and evaluated Angelika Borrero I managed the patient along with the ED Attending      Electronically Signed by         Quan Rizvi MD  06/23/19 1057

## 2019-06-23 NOTE — ED ATTENDING ATTESTATION
I,Eligio Chun MD, saw and evaluated the patient  I have discussed the patient with the resident/non-physician practitioner and agree with the resident's/non-physician practitioner's findings, Plan of Care, and MDM as documented in the resident's/non-physician practitioner's note, except where noted  All available labs and Radiology studies were reviewed  I was present for key portions of any procedure(s) performed by the resident/non-physician practitioner and I was immediately available to provide assistance  At this point I agree with the current assessment done in the Emergency Department  I have conducted an independent evaluation of this patient including a focused history and a physical exam         80-year-old male, presenting to the emergency department with a EMS  Patient was seen on arrival with the resident  Patient has a history of GI bleed secondary to diverticulosis, and is presenting to the emergency department for evaluation of bright red blood per rectum  Patient was found to be tachycardic pre-hospital but normotensive  Patient denies any chest pain, cough, shortness of breath, lightheadedness, abdominal pain  Patient is currently receiving enoxaparin subcutaneous injections after a right hip repair approximately 3 weeks ago  Ten systems reviewed negative except as noted  The patient is resting comfortably on a stretcher in no acute respiratory distress  The patient appears nontoxic  HEENT reveals moist mucous membranes  Head is normocephalic and atraumatic  Conjunctiva and sclera are normal  Neck is nontender and supple with full range of motion to flexion, extension, lateral rotation  No meningismus appreciated  No masses are appreciated  Lungs are clear to auscultation bilaterally without any wheezes, rales or rhonchi  Heart is regular rate and rhythm without any murmurs, rubs or gallops  Abdomen is soft and nontender without any rebound or guarding   Extremities appear grossly normal without any significant arthropathy  Right hip wound is well-healed  There is no signs of infection or discharge  Patient is awake, alert, and oriented x3  The patient has normal interaction  Motor is 5 out of 5   Bilateral upper lower extremities  assessment and plan:  80-year-old male presenting with GI bleed  Two large-bore IVs were established, lab work was drawn, the patient was consented for blood  The patient was re-evaluated during the emergency department stay for hemodynamic stability as well as risk notice of bleed  The patient was discussed with admitting staff and a gastroenterology consultation was obtained  Total critical care time spent in the patient's initial evaluation, ordering studies, following up on studies, reviewing past records and talking to specialists equals 33 minutes          Labs Reviewed   CBC AND DIFFERENTIAL - Abnormal       Result Value Ref Range Status    WBC 11 35 (*) 4 31 - 10 16 Thousand/uL Final    RBC 3 16 (*) 3 88 - 5 62 Million/uL Final    Hemoglobin 8 8 (*) 12 0 - 17 0 g/dL Final    Hematocrit 30 5 (*) 36 5 - 49 3 % Final    MCV 97  82 - 98 fL Final    MCH 27 8  26 8 - 34 3 pg Final    MCHC 28 9 (*) 31 4 - 37 4 g/dL Final    RDW 18 2 (*) 11 6 - 15 1 % Final    MPV 9 8  8 9 - 12 7 fL Final    Platelets 511 (*) 176 - 390 Thousands/uL Final    nRBC 0  /100 WBCs Final    Neutrophils Relative 76 (*) 43 - 75 % Final    Immat GRANS % 1  0 - 2 % Final    Lymphocytes Relative 7 (*) 14 - 44 % Final    Monocytes Relative 12  4 - 12 % Final    Eosinophils Relative 3  0 - 6 % Final    Basophils Relative 1  0 - 1 % Final    Neutrophils Absolute 8 72 (*) 1 85 - 7 62 Thousands/µL Final    Immature Grans Absolute 0 06  0 00 - 0 20 Thousand/uL Final    Lymphocytes Absolute 0 77  0 60 - 4 47 Thousands/µL Final    Monocytes Absolute 1 35 (*) 0 17 - 1 22 Thousand/µL Final    Eosinophils Absolute 0 35  0 00 - 0 61 Thousand/µL Final    Basophils Absolute 0 10  0 00 - 0 10 Thousands/µL Final   COMPREHENSIVE METABOLIC PANEL - Abnormal    Sodium 140  136 - 145 mmol/L Final    Potassium 4 5  3 5 - 5 3 mmol/L Final    Chloride 110 (*) 100 - 108 mmol/L Final    CO2 23  21 - 32 mmol/L Final    ANION GAP 7  4 - 13 mmol/L Final    BUN 27 (*) 5 - 25 mg/dL Final    Creatinine 1 34 (*) 0 60 - 1 30 mg/dL Final    Comment: Standardized to IDMS reference method    Glucose 139  65 - 140 mg/dL Final    Comment:   If the patient is fasting, the ADA then defines impaired fasting glucose as > 100 mg/dL and diabetes as > or equal to 123 mg/dL  Specimen collection should occur prior to Sulfasalazine administration due to the potential for falsely depressed results  Specimen collection should occur prior to Sulfapyridine administration due to the potential for falsely elevated results  Glucose, Fasting 139 (*) 65 - 99 mg/dL Final    Comment:   Specimen collection should occur prior to Sulfasalazine administration due to the potential for falsely depressed results  Specimen collection should occur prior to Sulfapyridine administration due to the potential for falsely elevated results  Calcium 8 6  8 3 - 10 1 mg/dL Final    AST 16  5 - 45 U/L Final    Comment:   Specimen collection should occur prior to Sulfasalazine administration due to the potential for falsely depressed results  ALT 23  12 - 78 U/L Final    Comment:   Specimen collection should occur prior to Sulfasalazine and/or Sulfapyridine administration due to the potential for falsely depressed results       Alkaline Phosphatase 97  46 - 116 U/L Final    Total Protein 7 1  6 4 - 8 2 g/dL Final    Albumin 2 9 (*) 3 5 - 5 0 g/dL Final    Total Bilirubin 0 46  0 20 - 1 00 mg/dL Final    eGFR 46  ml/min/1 73sq m Final    Narrative:     [facility] guidelines for Chronic Kidney Disease (CKD):     Stage 1 with normal or high GFR (GFR > 90 mL/min/1 73 square meters)    Stage 2 Mild CKD (GFR = 60-89 mL/min/1 73 square meters)    Stage 3A Moderate CKD (GFR = 45-59 mL/min/1 73 square meters)    Stage 3B Moderate CKD (GFR = 30-44 mL/min/1 73 square meters)    Stage 4 Severe CKD (GFR = 15-29 mL/min/1 73 square meters)    Stage 5 End Stage CKD (GFR <15 mL/min/1 73 square meters)  Note: GFR calculation is accurate only with a steady state creatinine   POCT CHEM 8+ - Abnormal    SODIUM, I-STAT 139  136 - 145 mmol/l Final    Potassium, i-STAT 6 2 (*) 3 5 - 5 3 mmol/L Final    Chloride, istat 110 (*) 100 - 108 mmol/L Final    CO2, i-STAT 22  21 - 32 mmol/L Final    Anion Gap, i-STAT 14 (*) 4 - 13 mmol/L Final    Calcium, Ionized i-STAT 1 08 (*) 1 12 - 1 32 mmol/L Final    BUN, I-STAT 38 (*) 5 - 25 mg/dl Final    Creatinine, i-STAT 1 4 (*) 0 6 - 1 3 mg/dl Final    eGFR 44  ml/min/1 73sq m Final    Glucose, i-STAT 157 (*) 65 - 140 mg/dl Final    Hct, i-STAT 28 (*) 36 5 - 49 3 % Final    Hgb, i-STAT 9 5 (*) 12 0 - 17 0 g/dl Final    Specimen Type VENOUS   Final    Narrative:     [employer] guidelines for Chronic Kidney Disease (CKD):     Stage 1 with normal or high GFR (GFR > 90 mL/min/1 73 square meters)    Stage 2 Mild CKD (GFR = 60-89 mL/min/1 73 square meters)    Stage 3A Moderate CKD (GFR = 45-59 mL/min/1 73 square meters)    Stage 3B Moderate CKD (GFR = 30-44 mL/min/1 73 square meters)    Stage 4 Severe CKD (GFR = 15-29 mL/min/1 73 square meters)    Stage 5 End Stage CKD (GFR <15 mL/min/1 73 square meters)  Note: GFR calculation is accurate only with a steady state creatinine   HEMOGLOBIN       No orders to display

## 2019-06-23 NOTE — QUICK NOTE
Please see full note by Dr Shala Villatoro  28-year-old with history of diverticular bleed presenting with melena  Patient residing at [address] [place] [address] for rehab after recent hip surgery  Currently on Lovenox  Per EMS was hypoxic to low 90s with blood pressure in the low 100s on their arrival   Denies fever, vomiting/hematemesis, chest pain, shortness of breath  Exam:  Afebrile  Blood pressure 120s over 50s  Pale  Awake and alert  A-fib  Lungs clear  No abdominal tenderness to palpation  Black stool is visible on rectal exam     Impression:  GI bleed  Plan:  CDC, CMP, type and screen  Blood ordered an available for transfusion as needed  Admit        Marry Lopez MD  Emergency Medicine Resident Physician  PGY-1

## 2019-06-23 NOTE — ASSESSMENT & PLAN NOTE
Hold antihypertensive today however continue with beta-blockers for atrial fibrillation and monitor closely

## 2019-06-23 NOTE — ASSESSMENT & PLAN NOTE
Reported by family, complains of suprapubic discomfort on physical exam with urge to urinate  Bladder scan  Urinary retention protocol  If necessary insert Sepulveda but try to avoid as much as possible

## 2019-06-23 NOTE — ED NOTES
Spoke with the daughter at the bedside regarding plan of care for the pt, bed delay, and concerns with regards to Fe supplements        Audie Fields RN  06/23/19 2258

## 2019-06-23 NOTE — H&P
H&P- Erin Leyva 6/27/1927, 80 y o  male MRN: [id number]    Unit/Bed#: ED 27 Encounter: [id number]    Primary Care Provider: Terrance Rivera DO   Date and time admitted to hospital: 6/23/2019 11:40 AM        Urinary retention  Assessment & Plan  Reported by family, complains of suprapubic discomfort on physical exam with urge to urinate  Bladder scan  Urinary retention protocol  If necessary insert Sepulveda but try to avoid as much as possible    CKD (chronic kidney disease) stage 3, GFR 30-59 ml/min ([facility])  Assessment & Plan  Pending chemistry, will follow, normally baseline creatinine between 1 2 and 1 4    Rheumatoid arthritis ([facility])  Assessment & Plan  Prednisone 2 5 mg daily      Dementia  Assessment & Plan  Supportive care, continue with Zoloft daily    Hypertension  Assessment & Plan  Hold antihypertensive today however continue with beta-blockers for atrial fibrillation and monitor closely    * GI bleed  Assessment & Plan  Bright red blood per rectum followed by melena as documented in the ER  Initially report tachycardia with heart rate 120 and hypotension with hypoxia were not found in the ER  Atrial fibrillation with a slightly elevated ventricular rate is noted however blood pressure is controlled and patient is saturating without oxygen in the low 90s  H&H q 8 hours, 1st hemoglobin is stable at patient last known baseline at 8 7  Transfuse for hemoglobin less than 7  Type and screen and blood units have been ordered in the ER to be on hold   Consult gastroenterologist  Discontinue Lovenox and aspirin for now  Previous history of diverticular bleed, possible recurrence        VTE Prophylaxis: GI bleed  / sequential compression device   Code Status:  DNR level 3  POLST: POLST is not applicable to this patient  Discussion with family:  Daughter and wife at bedside    Anticipated Length of Stay:  Patient will be admitted on an Observation basis with an anticipated length of stay of  < 2 midnights  Justification for Hospital Stay:  Please refer to above    Total Time for Visit, including Counseling / Coordination of Care: 1 hour  Greater than 50% of this total time spent on direct patient counseling and coordination of care  Chief Complaint:   I do not know      History of Present Illness:    Roberto Dutta is a 80 y o  male who presents with GI bleed  This is a 59-year-old male with past medical history of chronic anemia, previous CVA, right femur fracture status post internal fixation by orthopedic surgeon, history of memory loss/dementia, atrial fibrillation not on anticoagulation secondary to risk of falls, previous GI bleed, found to be diverticular that time, was recently discharged about Friday from rehabilitation center home  As per family, the patient was having a very good day on Saturday after discharge from rehabilitation without any symptoms or complaints  He is recovering well after surgery, walks with walker, has completed physical therapy as prescribed at least on an inpatient settings  However, this morning around 4:30 a m  Woke up and had to use the toilet and family noted that he had bowel movement that was almost completely bloody  The 2nd with movement similar to the 1st followed around 9:30 a m  Chantale Sole At that point, the family called 911 for patient to be brought to the hospital for further evaluation of GI bleed  Has not had any bowel movement in the hospital however he was found to have melanotic stool on examination in ED  Also, EMS reported transient hypoxia tachycardia and hypotension which was not found upon arrival in the ER and vital signs at that point were found to be stable  Hemoglobin stable when compared to previous recent baseline  At present time, the patient appears to be upset to be again in the hospital and is not providing history of complaints        Review of Systems:    Review of Systems   Unable to perform ROS: Other       Past Medical and Surgical History:     Past Medical History:   Diagnosis Date    Abnormal blood chemistry     resolved: 5/13/2017    Accidental puncture during procedure on organ 04/27/2011    accidental puncture/laceration during a procedure    Adenocarcinoma of prostate ([facility] ) 11/02/2004    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    rheumatoid arthritis    Bilateral edema of lower extremity     last assessed-7/1/2015    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    Coronary artery disease     Dementia     Gait dyspraxia     last assessed-1/26/2017    Gastrointestinal hemorrhage     last assessed-2/15/2017    Hematochezia     resolved: 2/15/2017    Herpes zoster 12/07/2011    History of degenerative disc disease     resolved: 5/13/2017    History of methicillin resistant staphylococcus aureus (MRSA)     nasal swab negative 5/15/2019-Contact isolation discontinued per protocol    Hyperlipidemia     Hypertension     Incontinence of feces     last assessed-11/17/2016    Inflamed seborrheic keratosis 11/30/2006    Neoplasm of bone 11/20/2007    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    Osteoarthritis     resolved: 10/1/2017    PAD [facility]peripheral artery disease) ([facility])     Paronychia of toe     last assessed-8/6/2013-unspecified laterality    Rectal bleeding     resolved-7/30/2017    Rheumatoid arthritis involving both hands ([facility] )     Sigmoid diverticulitis     last assessed-1/26/2017    Squamous cell carcinoma of cheek     resolved-7/30/2017    Tinea corporis 06/04/2008    Transient disorder of initiating or maintaining sleep 05/21/2010    Vertigo, benign paroxysmal, unspecified laterality 07/09/2012    Xerosis cutis     last assessed-8/6/2013       Past Surgical History:   Procedure Laterality Date    COLONOSCOPY N/A 1/19/2017    Procedure: COLONOSCOPY;  Surgeon: Samantha Schultz MD;  Location: [place] [address] GI LAB;   Service:     COLONOSCOPY N/A 1/18/2017    Procedure: COLONOSCOPY;  Surgeon: Samantha Schultz MD;  Location: [place] [address] GI LAB; Service:     COLONOSCOPY N/A 6/26/2017    Procedure: COLONOSCOPY;  Surgeon: Riley Cardoza MD;  Location: [place] [address] GI LAB; Service: Gastroenterology    CORONARY ARTERY BYPASS GRAFT      MD OPEN RX FEMUR FX+INTRAMED DELON Right 5/28/2019    Procedure: INSERTION NAIL IM FEMUR ANTEGRADE (TROCHANTERIC), RIGHT;  Surgeon: Pamela Mosher MD;  Location: [facility];  Service: Orthopedics       Meds/Allergies:    Prior to Admission medications    Medication Sig Start Date End Date Taking?  Authorizing Provider   amLODIPine (NORVASC) 5 mg tablet Take 1 tablet (5 mg total) by mouth daily 5/16/19  Yes Tu Pope MD   Ascorbic Acid (VITAMIN C) 1000 MG tablet Take by mouth daily  6/12/07  Yes Historical Provider, MD   aspirin 81 mg chewable tablet Chew 1 tablet (81 mg total) daily 5/16/19  Yes Tu Pope MD   atorvastatin (LIPITOR) 40 mg tablet Take 1 tablet (40 mg total) by mouth every evening 5/15/19  Yes Tu Pope MD   docusate sodium (COLACE) 100 mg capsule Take 1 capsule (100 mg total) by mouth 2 (two) times a day 5/30/19  Yes MAZIN Cazares   enoxaparin (LOVENOX) 40 mg/0 4 mL Inject 0 4 mL (40 mg total) under the skin daily for 28 days  Patient taking differently: Inject 40 mg under the skin daily  5/28/19 6/25/19 Yes Supriya Pope PA-C   ferrous sulfate 325 (65 Fe) mg tablet Take 325 mg by mouth daily with breakfast   Yes Historical Provider, MD   gabapentin (NEURONTIN) 300 mg capsule Take 1 capsule (300 mg total) by mouth daily at bedtime 5/15/19  Yes Tu Pope MD   metoprolol tartrate (LOPRESSOR) 50 mg tablet Take 0 5 tablets (25 mg total) by mouth 2 (two) times a day 5/15/19  Yes Tu Pope MD   Multiple Vitamins-Minerals (CENTRUM SILVER PO) Take by mouth daily 6/12/07  Yes Historical Provider, MD   polyethylene glycol (MIRALAX) 17 g packet Take 17 g by mouth daily 5/31/19  Yes MAZIN Cazares   predniSONE 2 5 mg tablet Take by mouth 6/29/17  Yes Historical Provider, MD   senna ([address]) 8 6 mg Take 1 tablet (8 6 mg total) by mouth daily at bedtime 19  Yes MAZIN Thomas   sertraline (ZOLOFT) 25 mg tablet Take 50 mg by mouth daily   Yes Historical Provider, MD   Lactobacillus (PROBIATA PO) Take by mouth daily 17   Historical Provider, MD   NON FORMULARY Vinpocetine 30mg daily in PM    Historical Provider, MD   oxybutynin (DITROPAN) 5 mg tablet Take 5 mg by mouth 3 (three) times a day    Historical Provider, MD   tolterodine (DETROL LA) 2 mg 24 hr capsule TAKE 1 CAPSULE BY MOUTH ONCE DAILY 19   Chata Cloud DO     Please brought from home, reconciled in ED    Allergies: No Known Allergies    Social History:     Marital Status: /Civil Union     Substance Use History:   Social History     Substance and Sexual Activity   Alcohol Use Not Currently     Social History     Tobacco Use   Smoking Status Former Smoker    Packs/day: 1 00    Years: 30 00    Pack years: 30     Types: Cigarettes    Last attempt to quit: 1955    Years since quittin 4   Smokeless Tobacco Never Used     Social History     Substance and Sexual Activity   Drug Use Not Currently       Family History:    non-contributory    Physical Exam:     Vitals:   Blood Pressure: 151/67 (19 1302)  Pulse: 90 (19 1302)  Temperature: 97 5 °F (36 4 °C) (19 1151)  Temp Source: Oral (19 1151)  Respirations: 22 (19 1151)  Height: 5' 8" (172 7 cm) (19 1151)  Weight - Scale: 67 7 kg (149 lb 4 oz) (19 1151)  SpO2: 100 % (19 1302)    Physical Exam   Constitutional: He appears well-developed  No distress  HENT:   Head: Normocephalic and atraumatic  Cardiovascular: Normal rate  Exam reveals no friction rub  Murmur heard  Irregular   Pulmonary/Chest: Effort normal  No stridor  No respiratory distress  He has no wheezes  Abdominal: Soft  He exhibits no distension  There is no tenderness  There is no guarding     Mild suprapubic discomfort with urge to urinate Musculoskeletal: He exhibits no edema  Neurological: He is alert  Skin: Skin is warm  Psychiatric:   Flat           Additional Data:     Lab Results: I have personally reviewed pertinent reports  Results from last 7 days   Lab Units 06/23/19  1157 06/23/19  1155   WBC Thousand/uL  --  11 35*   HEMOGLOBIN g/dL  --  8 8*   I STAT HEMOGLOBIN g/dl 9 5*  --    HEMATOCRIT %  --  30 5*   HEMATOCRIT, ISTAT % 28*  --    PLATELETS Thousands/uL  --  858*   NEUTROS PCT %  --  76*   LYMPHS PCT %  --  7*   MONOS PCT %  --  12   EOS PCT %  --  3     Results from last 7 days   Lab Units 06/23/19  1157   CO2, I-STAT mmol/L 22   AGAP mmol/L 14*                       Imaging: I have personally reviewed pertinent reports  No orders to display         Allscripts / Epic Records Reviewed: Yes     ** Please Note: This note has been constructed using a voice recognition system   **

## 2019-06-23 NOTE — CONSULTS
Consultation - [facility]) [facility]  Atha Romberg 80 y o  male MRN: [id number]  Unit/Bed#: ED 27 Encounter: [id number]        Consults    Reason for Consult / Principal Problem:     GI Bleeding  Aanemia      ASSESSMENT AND PLAN:      GI Bleeding  Anemia  - dark stool with hematochezia and ? Brief episode of hemodynamic instability   -  Patient with chronic normocytic anemia baseline hemoglobin 8-12  Last hemoglobin 8 1 one month ago today's hemoglobin 8 8  - no evidence of current active bleeding   -Hemodynamically stable  -BUN/Cr ratio not reliable due to history of CKD  -dark stool may be secondary to iron supplementation  - at this juncture difficult to say whether or not this is upper versus lower GI bleeding; differential includes  ulcer, AVM, Dieulafoy's, erosive gastritis, from below includes diverticular bleeding as well as ischemic colitis  -recommend continued monitoring of hemoglobin every 8-12 hours and transfuse as needed  - check lactate  -check CT noncontrast to help identify any evidence of ischemic colitis particularly on the right side and if so recommend starting antibiotics  - patient's daughter who is power of  prefers to hold off on endoscopic intervention unless absolutely needed " as a last resort"  - continue supportive measures, if patient becomes hemodynamically unstable, has signs of overt active GI bleeding or continues to have drop in hemoglobin will consider endoscopic evaluation  - continue clear liquid diet  ______________________________________________________________________    HPI:   66-year-old male seen in consultation for GI bleeding and anemia  Patient with multiple  Medical problems including CAD, P [de-identified], hypertension, hyperlipidemia, CVA, dementia, prostate CA, history of diverticulitis, history of GI bleeding and chronic normocytic anemia      As per daughter patient recently discharged from rehab facility while recovering from recent hip surgery after a fall  He was being maintained on iron supplementation as well as Lovenox injections for DVT prophylaxis but progressing well and ambulating with a walker  At 4:00 a m  today  patient had a black solid bowel movement with jelly like dark red blood  This was followed by multiple episodes of pure bright red blood  therefore she called EMS for further evaluation  There is reported tachycardia and hypertension during the mass transfer however nothing reported and vital signs have not demonstrated this in the ED  Patient denies associated abdominal pain, nausea, vomiting or fevers  No reported NSAID use  He has history of GI bleeding in the past thought to be due to diverticulosis  Last colonoscopy 6/26/17 which was poor prep but showed diverticulosis,  tubular adenoma and hemorrhoids  In ED patient has not had any further bowel movements however upon rectal examination there was noted black so dried stool in the perianal area  REVIEW OF SYSTEMS:    CONSTITUTIONAL: Denies any fever, chills, rigors, and weight loss  HEENT: No earache or tinnitus  Denies hearing loss or visual disturbances  CARDIOVASCULAR: No chest pain or palpitations  RESPIRATORY: Denies any cough, hemoptysis, shortness of breath or dyspnea on exertion  GASTROINTESTINAL: As noted in the History of Present Illness  GENITOURINARY: No problems with urination  Denies any hematuria or dysuria  NEUROLOGIC: No dizziness or vertigo, denies headaches  MUSCULOSKELETAL: Denies any muscle or joint pain  SKIN: Denies skin rashes or itching  ENDOCRINE: Denies excessive thirst  Denies intolerance to heat or cold  PSYCHOSOCIAL: Denies depression or anxiety  Denies any recent memory loss         Historical Information   Past Medical History:   Diagnosis Date    Abnormal blood chemistry     resolved: 5/13/2017    Accidental puncture during procedure on organ 04/27/2011    accidental puncture/laceration during a procedure    Adenocarcinoma of prostate ([facility] ) 11/02/2004    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    rheumatoid arthritis    Bilateral edema of lower extremity     last assessed-7/1/2015    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    Coronary artery disease     Dementia     Gait dyspraxia     last assessed-1/26/2017    Gastrointestinal hemorrhage     last assessed-2/15/2017    Hematochezia     resolved: 2/15/2017    Herpes zoster 12/07/2011    History of degenerative disc disease     resolved: 5/13/2017    History of methicillin resistant staphylococcus aureus (MRSA)     nasal swab negative 5/15/2019-Contact isolation discontinued per protocol    Hyperlipidemia     Hypertension     Incontinence of feces     last assessed-11/17/2016    Inflamed seborrheic keratosis 11/30/2006    Neoplasm of bone 11/20/2007    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    Osteoarthritis     resolved: 10/1/2017    PAD [facility]peripheral artery disease) [facility])     Paronychia of toe     last assessed-8/6/2013-unspecified laterality    Rectal bleeding     resolved-7/30/2017    Rheumatoid arthritis involving both hands ([facility] )     Sigmoid diverticulitis     last assessed-1/26/2017    Squamous cell carcinoma of cheek     resolved-7/30/2017    Tinea corporis 06/04/2008    Transient disorder of initiating or maintaining sleep 05/21/2010    Vertigo, benign paroxysmal, unspecified laterality 07/09/2012    Xerosis cutis     last assessed-8/6/2013     Past Surgical History:   Procedure Laterality Date    COLONOSCOPY N/A 1/19/2017    Procedure: COLONOSCOPY;  Surgeon: Mak Antonio MD;  Location: [facility] GI [facility]; Service:     COLONOSCOPY N/A 1/18/2017    Procedure: COLONOSCOPY;  Surgeon: Mak Antonio MD;  Location: [facility] GI [facility]; Service:     COLONOSCOPY N/A 6/26/2017    Procedure: COLONOSCOPY;  Surgeon: Werner Botello MD;  Location: [facility] GI LAB;   Service: Gastroenterology    CORONARY ARTERY BYPASS GRAFT      MI OPEN RX FEMUR FX+INTRAMED DELON Right 5/28/2019 Procedure: INSERTION NAIL IM FEMUR ANTEGRADE (TROCHANTERIC), RIGHT;  Surgeon: Campos Desir MD;  Location: [facility];  Service: Orthopedics     Social History   Social History     Substance and Sexual Activity   Alcohol Use Not Currently     Social History     Substance and Sexual Activity   Drug Use Not Currently     Social History     Tobacco Use   Smoking Status Former Smoker    Packs/day: 1 00    Years: 30 00    Pack years: 30 00    Types: Cigarettes    Last attempt to quit: 1955    Years since quittin 4   Smokeless Tobacco Never Used     Family History   Problem Relation Age of Onset    Stroke Mother     Hypertension Father     Stroke Father        Meds/Allergies       (Not in a hospital admission)  Current Facility-Administered Medications   Medication Dose Route Frequency    acetaminophen (TYLENOL) tablet 650 mg  650 mg Oral Q6H PRN    ascorbic acid (VITAMIN C) tablet 250 mg  250 mg Oral Daily    atorvastatin (LIPITOR) tablet 40 mg  40 mg Oral QPM    docusate sodium (COLACE) capsule 100 mg  100 mg Oral BID    [START ON 2019] ferrous sulfate tablet 325 mg  325 mg Oral Daily With Breakfast    gabapentin (NEURONTIN) capsule 300 mg  300 mg Oral HS    metoprolol tartrate (LOPRESSOR) tablet 25 mg  25 mg Oral BID    oxybutynin (DITROPAN) tablet 5 mg  5 mg Oral TID    [START ON 2019] oxybutynin (DITROPAN-XL) 24 hr tablet 5 mg  5 mg Oral Daily    pantoprazole (PROTONIX) injection 40 mg  40 mg Intravenous Q12H [address]    polyethylene glycol (MIRALAX) packet 17 g  17 g Oral Daily    predniSONE tablet 2 5 mg  2 5 mg Oral Daily    saccharomyces boulardii (FLORASTOR) capsule 250 mg  250 mg Oral BID    senna (SENOKOT) tablet 8 6 mg  1 tablet Oral HS    sertraline (ZOLOFT) tablet 50 mg  50 mg Oral Daily    sodium chloride 0 9 % infusion  50 mL/hr Intravenous Continuous       No Known Allergies        Objective     Blood pressure (!) 178/81, pulse 87, temperature 97 5 °F (36 4 °C), temperature source Oral, resp  rate 22, height 5' 8" (1 727 m), weight 67 7 kg (149 lb 4 oz), SpO2 100 %  Body mass index is 22 69 kg/m²  Intake/Output Summary (Last 24 hours) at 6/23/2019 1611  Last data filed at 6/23/2019 1409  Gross per 24 hour   Intake    Output 400 ml   Net -400 ml         PHYSICAL EXAM:      General Appearance:   Alert, cooperative, no distress   HEENT:   Normocephalic, atraumatic, anicteric      Neck:  Supple, symmetrical, trachea midline   Lungs:   Clear to auscultation bilaterally; no rales, rhonchi or wheezing; respirations unlabored    Heart[de-identified]   Regular rate and rhythm; no murmur, rub, or gallop     Abdomen:   Soft, non-tender, non-distended; normal bowel sounds; no masses, no organomegaly    Genitalia:   Deferred    Rectal:   Deferred, as there was visualized  Black stool in the perianal region     Extremities:  No cyanosis, clubbing or edema    Pulses:  2+ and symmetric all extremities    Skin:  No jaundice, rashes, or lesions    Lymph nodes:  No palpable cervical lymphadenopathy        Lab Results:   Admission on 06/23/2019   Component Date Value    WBC 06/23/2019 11 35*    RBC 06/23/2019 3 16*    Hemoglobin 06/23/2019 8 8*    Hematocrit 06/23/2019 30 5*    MCV 06/23/2019 97     MCH 06/23/2019 27 8     MCHC 06/23/2019 28 9*    RDW 06/23/2019 18 2*    MPV 06/23/2019 9 8     Platelets 10/03/1233 858*    nRBC 06/23/2019 0     Neutrophils Relative 06/23/2019 76*    Immat GRANS % 06/23/2019 1     Lymphocytes Relative 06/23/2019 7*    Monocytes Relative 06/23/2019 12     Eosinophils Relative 06/23/2019 3     Basophils Relative 06/23/2019 1     Neutrophils Absolute 06/23/2019 8 72*    Immature Grans Absolute 06/23/2019 0 06     Lymphocytes Absolute 06/23/2019 0 77     Monocytes Absolute 06/23/2019 1 35*    Eosinophils Absolute 06/23/2019 0 35     Basophils Absolute 06/23/2019 0 10     Sodium 06/23/2019 140     Potassium 06/23/2019 4 5     Chloride 06/23/2019 110*    CO2 06/23/2019 23     ANION GAP 06/23/2019 7     BUN 06/23/2019 27*    Creatinine 06/23/2019 1 34*    Glucose 06/23/2019 139     Glucose, Fasting 06/23/2019 139*    Calcium 06/23/2019 8 6     AST 06/23/2019 16     ALT 06/23/2019 23     Alkaline Phosphatase 06/23/2019 97     Total Protein 06/23/2019 7 1     Albumin 06/23/2019 2 9*    Total Bilirubin 06/23/2019 0 46     eGFR 06/23/2019 46     SODIUM, I-STAT 06/23/2019 139     Potassium, i-STAT 06/23/2019 6 2*    Chloride, istat 06/23/2019 110*    CO2, i-STAT 06/23/2019 22     Anion Gap, i-STAT 06/23/2019 14*    Calcium, Ionized i-STAT 06/23/2019 1 08*    BUN, I-STAT 06/23/2019 38*    Creatinine, i-STAT 06/23/2019 1 4*    eGFR 06/23/2019 44     Glucose, i-STAT 06/23/2019 157*    Hct, i-STAT 06/23/2019 28*    Hgb, i-STAT 06/23/2019 9 5*    Specimen Type 06/23/2019 VENOUS        Imaging Studies: I have personally reviewed pertinent imaging studies

## 2019-06-23 NOTE — ED NOTES
Admitting requesting the pt to have a bladder scan done due to urinary retention  Scan read over 200ml        Jacque Rodriguez RN  06/23/19 1084

## 2019-06-23 NOTE — PROGRESS NOTES
Unable to get any admission data base due to pt  Refusal and alertness  Will attempt at another time

## 2019-06-24 ENCOUNTER — PATIENT OUTREACH (OUTPATIENT)
Dept: CASE MANAGEMENT | Facility: HOSPITAL | Age: 84
End: 2019-06-24

## 2019-06-24 LAB
ABO GROUP BLD: NORMAL
ANION GAP SERPL CALCULATED.3IONS-SCNC: 8 MMOL/L (ref 4–13)
ATRIAL RATE: 156 BPM
BLD GP AB SCN SERPL QL: NEGATIVE
BUN SERPL-MCNC: 41 MG/DL (ref 5–25)
CALCIUM SERPL-MCNC: 8.3 MG/DL (ref 8.3–10.1)
CHLORIDE SERPL-SCNC: 112 MMOL/L (ref 100–108)
CO2 SERPL-SCNC: 22 MMOL/L (ref 21–32)
CREAT SERPL-MCNC: 1.9 MG/DL (ref 0.6–1.3)
GFR SERPL CREATININE-BSD FRML MDRD: 30 ML/MIN/1.73SQ M
GLUCOSE SERPL-MCNC: 170 MG/DL (ref 65–140)
HCT VFR BLD AUTO: 22.8 % (ref 36.5–49.3)
HGB BLD-MCNC: 6.6 G/DL (ref 12–17)
HGB BLD-MCNC: 6.9 G/DL (ref 12–17)
HGB BLD-MCNC: 7.3 G/DL (ref 12–17)
HGB BLD-MCNC: 7.7 G/DL (ref 12–17)
LACTATE SERPL-SCNC: 1 MMOL/L (ref 0.5–2)
MAGNESIUM SERPL-MCNC: 2.4 MG/DL (ref 1.6–2.6)
PHOSPHATE SERPL-MCNC: 4.7 MG/DL (ref 2.3–4.1)
POTASSIUM SERPL-SCNC: 4.7 MMOL/L (ref 3.5–5.3)
QRS AXIS: -10 DEGREES
QRSD INTERVAL: 74 MS
QT INTERVAL: 336 MS
QTC INTERVAL: 422 MS
RH BLD: POSITIVE
SODIUM SERPL-SCNC: 142 MMOL/L (ref 136–145)
SPECIMEN EXPIRATION DATE: NORMAL
T WAVE AXIS: 85 DEGREES
VENTRICULAR RATE: 95 BPM

## 2019-06-24 PROCEDURE — P9016 RBC LEUKOCYTES REDUCED: HCPCS

## 2019-06-24 PROCEDURE — G8979 MOBILITY GOAL STATUS: HCPCS

## 2019-06-24 PROCEDURE — 86923 COMPATIBILITY TEST ELECTRIC: CPT

## 2019-06-24 PROCEDURE — 86901 BLOOD TYPING SEROLOGIC RH(D): CPT | Performed by: NURSE PRACTITIONER

## 2019-06-24 PROCEDURE — 97163 PT EVAL HIGH COMPLEX 45 MIN: CPT

## 2019-06-24 PROCEDURE — 97167 OT EVAL HIGH COMPLEX 60 MIN: CPT

## 2019-06-24 PROCEDURE — C9113 INJ PANTOPRAZOLE SODIUM, VIA: HCPCS | Performed by: INTERNAL MEDICINE

## 2019-06-24 PROCEDURE — G8987 SELF CARE CURRENT STATUS: HCPCS

## 2019-06-24 PROCEDURE — 93010 ELECTROCARDIOGRAM REPORT: CPT | Performed by: INTERNAL MEDICINE

## 2019-06-24 PROCEDURE — 83605 ASSAY OF LACTIC ACID: CPT | Performed by: INTERNAL MEDICINE

## 2019-06-24 PROCEDURE — 84100 ASSAY OF PHOSPHORUS: CPT | Performed by: INTERNAL MEDICINE

## 2019-06-24 PROCEDURE — G8978 MOBILITY CURRENT STATUS: HCPCS

## 2019-06-24 PROCEDURE — 85018 HEMOGLOBIN: CPT | Performed by: INTERNAL MEDICINE

## 2019-06-24 PROCEDURE — 86900 BLOOD TYPING SEROLOGIC ABO: CPT | Performed by: NURSE PRACTITIONER

## 2019-06-24 PROCEDURE — 85014 HEMATOCRIT: CPT | Performed by: NURSE PRACTITIONER

## 2019-06-24 PROCEDURE — G8988 SELF CARE GOAL STATUS: HCPCS

## 2019-06-24 PROCEDURE — 83735 ASSAY OF MAGNESIUM: CPT | Performed by: INTERNAL MEDICINE

## 2019-06-24 PROCEDURE — 99232 SBSQ HOSP IP/OBS MODERATE 35: CPT | Performed by: NURSE PRACTITIONER

## 2019-06-24 PROCEDURE — 80048 BASIC METABOLIC PNL TOTAL CA: CPT | Performed by: INTERNAL MEDICINE

## 2019-06-24 PROCEDURE — 86850 RBC ANTIBODY SCREEN: CPT | Performed by: NURSE PRACTITIONER

## 2019-06-24 PROCEDURE — 85018 HEMOGLOBIN: CPT | Performed by: FAMILY MEDICINE

## 2019-06-24 PROCEDURE — NC001 PR NO CHARGE: Performed by: INTERNAL MEDICINE

## 2019-06-24 PROCEDURE — 85018 HEMOGLOBIN: CPT | Performed by: NURSE PRACTITIONER

## 2019-06-24 RX ORDER — OXYBUTYNIN CHLORIDE 5 MG/1
5 TABLET, EXTENDED RELEASE ORAL DAILY
Status: DISCONTINUED | OUTPATIENT
Start: 2019-06-24 | End: 2019-06-24 | Stop reason: SDUPTHER

## 2019-06-24 RX ORDER — ACETAMINOPHEN 325 MG/1
650 TABLET ORAL ONCE
Status: COMPLETED | OUTPATIENT
Start: 2019-06-24 | End: 2019-06-24

## 2019-06-24 RX ORDER — SODIUM CHLORIDE 450 MG/100ML
250 INJECTION, SOLUTION INTRAVENOUS CONTINUOUS
Status: DISPENSED | OUTPATIENT
Start: 2019-06-24 | End: 2019-06-25

## 2019-06-24 RX ORDER — OXYBUTYNIN CHLORIDE 5 MG/1
5 TABLET, EXTENDED RELEASE ORAL DAILY
Status: DISCONTINUED | OUTPATIENT
Start: 2019-06-25 | End: 2019-07-02 | Stop reason: HOSPADM

## 2019-06-24 RX ORDER — SODIUM CHLORIDE 450 MG/100ML
75 INJECTION, SOLUTION INTRAVENOUS CONTINUOUS
Status: DISCONTINUED | OUTPATIENT
Start: 2019-06-24 | End: 2019-06-28

## 2019-06-24 RX ADMIN — Medication 250 MG: at 09:35

## 2019-06-24 RX ADMIN — ATORVASTATIN CALCIUM 40 MG: 40 TABLET, FILM COATED ORAL at 17:10

## 2019-06-24 RX ADMIN — PANTOPRAZOLE SODIUM 40 MG: 40 INJECTION, POWDER, FOR SOLUTION INTRAVENOUS at 21:19

## 2019-06-24 RX ADMIN — OXYBUTYNIN CHLORIDE 5 MG: 5 TABLET ORAL at 09:33

## 2019-06-24 RX ADMIN — SERTRALINE HYDROCHLORIDE 50 MG: 50 TABLET ORAL at 09:33

## 2019-06-24 RX ADMIN — ACETAMINOPHEN 650 MG: 325 TABLET ORAL at 15:40

## 2019-06-24 RX ADMIN — SODIUM CHLORIDE 50 ML/HR: 4.5 INJECTION, SOLUTION INTRAVENOUS at 15:40

## 2019-06-24 RX ADMIN — PANTOPRAZOLE SODIUM 40 MG: 40 INJECTION, POWDER, FOR SOLUTION INTRAVENOUS at 09:33

## 2019-06-24 RX ADMIN — Medication 250 MG: at 09:33

## 2019-06-24 RX ADMIN — FERROUS SULFATE TAB 325 MG (65 MG ELEMENTAL FE) 325 MG: 325 (65 FE) TAB at 07:02

## 2019-06-24 RX ADMIN — ACETAMINOPHEN 650 MG: 325 TABLET ORAL at 14:22

## 2019-06-24 RX ADMIN — METOPROLOL TARTRATE 25 MG: 25 TABLET, FILM COATED ORAL at 17:10

## 2019-06-24 RX ADMIN — DOCUSATE SODIUM 100 MG: 100 CAPSULE, LIQUID FILLED ORAL at 17:10

## 2019-06-24 RX ADMIN — Medication 250 MG: at 17:10

## 2019-06-24 RX ADMIN — PREDNISONE 2.5 MG: 2.5 TABLET ORAL at 09:37

## 2019-06-24 RX ADMIN — GABAPENTIN 300 MG: 300 CAPSULE ORAL at 21:19

## 2019-06-24 RX ADMIN — SENNOSIDES 8.6 MG: 8.6 TABLET, FILM COATED ORAL at 21:19

## 2019-06-24 NOTE — PROGRESS NOTES
Progress Note - Crystal Looney 6/27/1927, 80 y o  male MRN: [id number]    Unit/Bed#: 2 213-01 Encounter: [id number]    Primary Care Provider: Shaheed Bone DO   Date and time admitted to hospital: 6/23/2019 11:40 AM        * GI bleed  Assessment & Plan  Bright red blood per rectum followed by melena as documented in the ER  Continues with bright red clots per rectum   Likely lower vs upper GI bleed  Previous history of diverticular bleed, possible recurrence  Recently discharged from [facility] following right femur fracture status post internal fixation on Lovenox for 28 days  CT A/P: unremarkable for colitis or source of bleed   Lactic 2 0   · GI following, appreciate input   · Holding ASA and Lovenox  · Continue Protonix 40 mg IV BID  · Family does not want endoscopy unless it is a last resort option   · Family OK with pRBC transfusion as needed  · Consent obtained and on chart  · Will transfuse 1 unit of pRBCs for HGB of 6 9 gm/dL  · Monitor HGB Q8H    Urinary retention  Assessment & Plan  Reported by family, complains of suprapubic discomfort on physical exam with urge to urinate  · Bladder scan revealed > 300 mL  · Urinary retention protocol  · Monitor I+O  · Consider urology evaluation  · If necessary insert eisenberg catheter, but try to avoid as much as possible    CKD (chronic kidney disease) stage 3, GFR 30-59 ml/min ([facility])  Assessment & Plan  Cr 1 3 with a baseline creatinine between 1 2 and 1 4  · Avoid nephrotoxic agents and relative hypotension  · Follow-up pending BMP    Rheumatoid arthritis ([address] )  Assessment & Plan  Continue Prednisone 2 5 mg daily  Start GI prophylaxis, Protonix 40 mg IV BID    Dementia  Assessment & Plan  Supportive care, continue with Zoloft daily  PT/OT recommending [facility] on discharge - dtr will consider     Hypertension  Assessment & Plan  Continue Lopressor with holding parameters         VTE Pharmacologic Prophylaxis:   Pharmacologic: Pharmacologic VTE Prophylaxis contraindicated due to GI bleed  Mechanical VTE Prophylaxis in Place: Yes    Patient Centered Rounds: I have performed bedside rounds with nursing staff today  Discussions with Specialists or Other Care Team Provider: Nursing, CM, GI    Education and Discussions with Family / Patient: I have answered all questions to the best of my ability  Time Spent for Care: 20 minutes  More than 50% of total time spent on counseling and coordination of care as described above  Current Length of Stay: 0 day(s)    Current Patient Status: Observation   Certification Statement: The patient will continue to require additional inpatient hospital stay due to GI bleed with active bleeding and drop in HGB    Discharge Plan: Patient is not medically stable for discharge today, likely in 48 hours pending progress  Code Status: Level 3 - DNAR and DNI      Subjective:   Continues with generalized abdominal discomfort, especially suprapubic region  Bloody BM today  Denies HA, dizziness, CP, or SOB  Objective:     Vitals:   Temp (24hrs), Av °F (36 7 °C), Min:97 3 °F (36 3 °C), Max:98 5 °F (36 9 °C)    Temp:  [97 3 °F (36 3 °C)-98 5 °F (36 9 °C)] 97 3 °F (36 3 °C)  HR:  [] 99  Resp:  [16-20] 16  BP: (119-178)/(54-81) 119/54  SpO2:  [94 %-100 %] 99 %  Body mass index is 22 35 kg/m²  Input and Output Summary (last 24 hours): Intake/Output Summary (Last 24 hours) at 2019 1320  Last data filed at 2019 0756  Gross per 24 hour   Intake 707 5 ml   Output 400 ml   Net 307 5 ml       Physical Exam:     Physical Exam   Constitutional: He appears well-developed  He is cooperative  He appears ill (chronically ill appearing )  No distress  HENT:   Head: Normocephalic  Neck: Normal range of motion  Cardiovascular: Normal rate and regular rhythm  Pulmonary/Chest: Effort normal and breath sounds normal  No respiratory distress  He has no wheezes  He has no rhonchi  He has no rales  Abdominal: Soft  Bowel sounds are normal  He exhibits no distension  There is no tenderness  Musculoskeletal: Normal range of motion  He exhibits no edema or tenderness  Neurological: He is alert  Alert to person and place, disoriented to time    Skin: Skin is warm and dry  He is not diaphoretic  Psychiatric: He has a normal mood and affect  His speech is normal and behavior is normal  Cognition and memory are impaired  He expresses inappropriate judgment  Nursing note and vitals reviewed  Additional Data:     Labs:    Results from last 7 days   Lab Units 06/24/19  0828  06/23/19  1157 06/23/19  1155   WBC Thousand/uL  --   --   --  11 35*   HEMOGLOBIN g/dL 6 9*   < >  --  8 8*   I STAT HEMOGLOBIN g/dl  --   --  9 5*  --    HEMATOCRIT %  --   --   --  30 5*   HEMATOCRIT, ISTAT %  --   --  28*  --    PLATELETS Thousands/uL  --   --   --  858*   NEUTROS PCT %  --   --   --  76*   LYMPHS PCT %  --   --   --  7*   MONOS PCT %  --   --   --  12   EOS PCT %  --   --   --  3    < > = values in this interval not displayed  Results from last 7 days   Lab Units 06/23/19  1357 06/23/19  1157   POTASSIUM mmol/L 4 5  --    CHLORIDE mmol/L 110*  --    CO2 mmol/L 23  --    CO2, I-STAT mmol/L  --  22   BUN mg/dL 27*  --    CREATININE mg/dL 1 34*  --    CALCIUM mg/dL 8 6  --    ALK PHOS U/L 97  --    ALT U/L 23  --    AST U/L 16  --    GLUCOSE, ISTAT mg/dl  --  157*           * I Have Reviewed All Lab Data Listed Above  * Additional Pertinent Lab Tests Reviewed:  All Labs Within Last 24 Hours Reviewed    Imaging:    Imaging Reports Reviewed Today Include: CT A/P  Imaging Personally Reviewed by Myself Includes:  None     Recent Cultures (last 7 days):           Last 24 Hours Medication List:     Current Facility-Administered Medications:  acetaminophen 650 mg Oral Q6H PRN Valdez Colindres MD   acetaminophen 650 mg Oral Once MAZIN Day   vitamin C 250 mg Oral Daily Valdez Colindres MD   atorvastatin 40 mg Oral QPM Selena Marr MD   docusate sodium 100 mg Oral BID Selena Marr MD   ferrous sulfate 325 mg Oral Daily With Breakfast Selena Marr MD   gabapentin 300 mg Oral HS Selena Marr MD   metoprolol tartrate 25 mg Oral BID Selena Marr MD   [START ON 6/25/2019] oxybutynin 5 mg Oral Daily Juan C Serrano MD   pantoprazole 40 mg Intravenous Q12H [facility] Selena Marr MD   polyethylene glycol 17 g Oral Daily Selena Marr MD   predniSONE 2 5 mg Oral Daily Selena Marr MD   saccharomyces boulardii 250 mg Oral BID Selena Marr MD   senna 1 tablet Oral HS Selena Marr MD   sertraline 50 mg Oral Daily Selena Marr MD        Today, Patient Was Seen By: MAZIN Gamino    ** Please Note: Dictation voice to text software may have been used in the creation of this document   **

## 2019-06-24 NOTE — PROGRESS NOTES
Was notified by nursing about decreasing hemoglobin and the presence of hematochezia, this was discussed with the patient's daughter since she expressed during the initial consult that she would like to hold off on any intervention   She still does not want any intervention performed at this time despite the low hemoglobin and the presence of GI bleeding and will like to continue with blood transfusion as needed, from the GI perspective we can be contacted if the patient's daughter decide to have any intervention performed, this was discussed with primary team

## 2019-06-24 NOTE — OCCUPATIONAL THERAPY NOTE
[address]  Evaluation     Patient Name: Jeanette Horowitz  FQYBW'N Date: 6/24/2019  Problem List  Patient Active Problem List   Diagnosis    GI bleed    Weakness    Hypertension    CAD (coronary artery disease)    [facility]peripheral [facility]) [facility]    Hyperlipidemia LDL goal <100    Back pain    Anemia, acute on chronic    Dementia    Idiopathic peripheral neuropathy    JENNYFER (acute kidney injury) ([facility]    Actinic keratosis    Adhesive capsulitis of right shoulder    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    Allergic rhinitis    Arthropathy of multiple sites    Benign colon polyp    Benign essential hypertension    Chronic back pain    Diaphragmatic hernia    Gout    Hemorrhoids    Iron deficiency anemia due to chronic blood loss    Lumbar disc disease    Mitral valve disorder    Depression    Overactive bladder    Prediabetes    Rheumatoid arthritis ([address]    Tricuspid valve disorders, non-rheumatic    Immunization due    Pain in both feet    Peripheral arteriosclerosis ([address]    Onychomycosis    Paronychia of toenail    Tinea pedis of both feet    CVA (cerebral vascular accident) ([facility]    Normocytic anemia    Displaced intertrochanteric fracture of right femur, initial encounter for closed fracture ([facility] [address] )    Preoperative examination    Aortic stenosis, mild    Abnormal TSH    CKD (chronic kidney disease) stage 3, GFR 30-59 ml/min ([facility])    Urinary retention     Past Medical History  Past Medical History:   Diagnosis Date    Abnormal blood chemistry     resolved: 5/13/2017    Accidental puncture during procedure on organ 04/27/2011    accidental puncture/laceration during a procedure    Adenocarcinoma of prostate ([address] ) 11/02/2004    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    rheumatoid arthritis    Bilateral edema of lower extremity     last assessed-7/1/2015    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    Coronary artery disease     Dementia     Gait dyspraxia last assessed-1/26/2017    Gastrointestinal hemorrhage     last assessed-2/15/2017    Hematochezia     resolved: 2/15/2017    Herpes zoster 12/07/2011    History of degenerative disc disease     resolved: 5/13/2017    History of methicillin resistant staphylococcus aureus (MRSA)     nasal swab negative 5/15/2019-Contact isolation discontinued per protocol    Hyperlipidemia     Hypertension     Incontinence of feces     last assessed-11/17/2016    Inflamed seborrheic keratosis 11/30/2006    Neoplasm of bone 11/20/2007    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    Osteoarthritis     resolved: 10/1/2017    [facility] disease) [facility]     Paronychia of toe     last assessed-8/6/2013-unspecified laterality    Rectal bleeding     resolved-7/30/2017    Rheumatoid arthritis involving both hands ([facility] [address] [facility]     Sigmoid diverticulitis     last assessed-1/26/2017    Squamous cell carcinoma of cheek     resolved-7/30/2017    Tinea corporis 06/04/2008    Transient disorder of initiating or maintaining sleep 05/21/2010    Vertigo, benign paroxysmal, unspecified laterality 07/09/2012    Xerosis cutis     last assessed-8/6/2013     Past Surgical History  Past Surgical History:   Procedure Laterality Date    COLONOSCOPY N/A 1/19/2017    Procedure: COLONOSCOPY;  Surgeon: Collette Contreras MD;  Location: [facility]; Service:     COLONOSCOPY N/A 1/18/2017    Procedure: COLONOSCOPY;  Surgeon: Collette Contreras MD;  Location: [facility] LAB; Service:     COLONOSCOPY N/A 6/26/2017    Procedure: COLONOSCOPY;  Surgeon: Katia Aguilar MD;  Location: [facility] GI LAB;   Service: Gastroenterology    CORONARY ARTERY BYPASS GRAFT      NM OPEN RX FEMUR FX+INTRAMED DELON Right 5/28/2019    Procedure: INSERTION NAIL IM FEMUR ANTEGRADE (TROCHANTERIC), RIGHT;  Surgeon: Jc Lawrence MD;  Location:  MAIN OR;  Service: Orthopedics         06/24/19 1016   Note Type   Note type Eval/Treat   Restrictions/Precautions   Weight Bearing Precautions Per Order No   Other Precautions Cognitive; Bed Alarm; Fall Risk   Pain Assessment   Pain Assessment No/denies pain   Home Living   Type of [address] Two level;Ramped entrance   Home Equipment Walker;Cane   Prior Function   Level of [place] Needs assistance with IADLs; Needs assistance with ADLs and functional mobility   Lives With Spouse   Receives Help From Family   ADL Assistance Needs assistance   IADLs Needs assistance   Falls in the last 6 months 1 to 4   Vocational Retired   Lifestyle   Autonomy family assists with adls, ambulates 1* with SPC - family manages iadls   Reciprocal Relationships supportive family   Service to Others retired   Intrinsic Gratification  sedentary   Subjective   Subjective c/o fatigue   ADL   Eating Assistance 2  Maximal Assistance   Grooming Assistance 2  Maximal Assistance   [address] 2  Maximal Assistance   [address] 2  Maximal Assistance   [address] 2  Maximal Assistance    [facility] 2  Maximal [address]  2  Maximal Assistance   Bed Mobility   Rolling R 2  Maximal assistance   Rolling L 2  Maximal assistance   Supine to Sit 2  Maximal assistance   Additional items Assist x 2   Sit to Supine 2  Maximal assistance   Additional items Assist x 2   Transfers   Sit to Stand 2  Maximal assistance   Additional items Assist x 2   Stand to Sit 2  Maximal assistance   Additional items Assist x 2   Additional Comments able to take 2 lateral sidesteps towards hob before needing to sit    Balance   Static Sitting Fair -   Dynamic Sitting Poor   Static Standing Poor   Dynamic Standing Poor   Activity Tolerance   Activity Tolerance Patient limited by fatigue;Treatment limited secondary to medical complications (Comment)   RUE Assessment   RUE Assessment WFL   LUE Assessment   LUE Assessment WFL   Cognition   Overall Cognitive Status Impaired   Arousal/Participation Arousable;Lethargic;Persistent stimuli required   Attention Attends with cues to redirect   Orientation Level Oriented to person;Oriented to place;Oriented to time  (general place/time)   Memory Decreased short term memory;Decreased recall of recent events;Decreased recall of precautions   Following Commands Follows one step commands with increased time or repetition   Assessment   Limitation Decreased ADL status; Decreased UE strength;Decreased Safe judgement during ADL;Decreased cognition;Decreased endurance;Decreased self-care trans;Decreased high-level ADLs   Prognosis Fair;Good   Assessment Pt is a 80 y o  male who was admitted to [address] [place] [address] on 6/23/2019 with GI bleed recent d/c from STR - pt with BRBPR    Pt's problem list also includes PMH of HTN, underlying neurological disorder, limited hearing, previous surgery, dementia and prostate ca, RA, CAD, DDD, PAD  At baseline pt was completing adls with assist from family, ambulates with [facility] - family manages iadls  Pt lives with spouse in 2 story home with ramped entrance  Currently pt requires max assist for overall ADLS and max a x 1-2  for functional mobility/transfers  Pt currently presents with impairments in the following categories -difficulty performing ADLS, difficulty performing IADLS , flat affect, decreased initiation and engagement  and health management  activity tolerance, endurance, standing balance/tolerance, sitting balance/tolerance, UE strength, arousal, memory, insight and safety   These impairments, as well as pt's fatigue, risk for falls and home environment  limit pt's ability to safely engage in all baseline areas of occupation, includingeating, grooming, bathing, dressing, toileting, functional mobility/transfers, community mobility, social participation  and leisure activities  From OT standpoint, recommend inpt rehab  upon D/C  OT will continue to follow to address the below stated goals      Goals   Patient Goals rest   LTG Time Frame 10-14   Long Term Goal #1 refer to established goals below   Plan   Treatment Interventions ADL retraining;Functional transfer training;UE strengthening/ROM; Endurance training;Cognitive reorientation;Patient/family training;Equipment evaluation/education; Compensatory technique education; Energy conservation; Activityengagement   Goal Expiration Date 07/08/19   OT Frequency 2-3x/wk   Recommendation   OT Discharge Recommendation Short Term Rehab   Barthel Index   Feeding 5   Bathing 0   Grooming Score 0   Dressing Score 0   Bladder Score 0   Bowels Score 0   Toilet Use Score 0   Transfers (Bed/Chair) Score 5   Mobility (Level Surface) Score 0   Stairs Score 0   Barthel Index Score 10       OCCUPATIONAL THERAPY GOALS:    *SBA feeding/grooming after setup   *Min a  adls after setup with use of AE PRN  *Min a toileting and clothing management   *Min a functional mobility and transfers to/from all surfaces with fair to fair+ dynamic balance and safety for participation in dynamic adls and iadl tasks   *Demonstrate fair+ carryover with safe use of RW during functional tasks   *Assess DME needs   *Increase activity tolerance to 30-35 minutes for participation in adls and enjoyable activities  *Pt to participate in further cognitive testing with good attention and participation to assist with safe d/c recommendations      Fanny Glaser, OT

## 2019-06-24 NOTE — ASSESSMENT & PLAN NOTE
Reported by family, complains of suprapubic discomfort on physical exam with urge to urinate  · Bladder scan revealed > 300 mL  · Urinary retention protocol  · Monitor I+O  · Consider urology evaluation  · If necessary insert eisenberg catheter, but try to avoid as much as possible

## 2019-06-24 NOTE — PLAN OF CARE
Problem: OCCUPATIONAL THERAPY ADULT  Goal: Performs self-care activities at highest level of function for planned discharge setting  See evaluation for individualized goals  Description  Treatment Interventions: ADL retraining, Functional transfer training, UE strengthening/ROM, Endurance training, Cognitive reorientation, Patient/family training, Equipment evaluation/education, Compensatory technique education, Energy conservation, Activityengagement          See flowsheet documentation for full assessment, interventions and recommendations  Note:   Limitation: Decreased ADL status, Decreased UE strength, Decreased Safe judgement during ADL, Decreased cognition, Decreased endurance, Decreased self-care trans, Decreased high-level ADLs  Prognosis: Fair, Good  Assessment: Pt is a 80 y o  male who was admitted to [facility] on 6/23/2019 with GI bleed recent d/c from STR - pt with BRBPR    Pt's problem list also includes PMH of HTN, underlying neurological disorder, limited hearing, previous surgery, dementia and prostate ca, RA, CAD, DDD, PAD  At baseline pt was completing adls with assist from family, ambulates with [address] - family manages iadls  Pt lives with spouse in 2 story home with ramped entrance  Currently pt requires max assist for overall ADLS and max a x 1-2  for functional mobility/transfers  Pt currently presents with impairments in the following categories -difficulty performing ADLS, difficulty performing IADLS , flat affect, decreased initiation and engagement  and health management  activity tolerance, endurance, standing balance/tolerance, sitting balance/tolerance, UE strength, arousal, memory, insight and safety    These impairments, as well as pt's fatigue, risk for falls and home environment  limit pt's ability to safely engage in all baseline areas of occupation, includingeating, grooming, bathing, dressing, toileting, functional mobility/transfers, community mobility, social participation  and leisure activities  From OT standpoint, recommend inpt rehab  upon D/C  OT will continue to follow to address the below stated goals        OT Discharge Recommendation: Short Term Rehab

## 2019-06-24 NOTE — ASSESSMENT & PLAN NOTE
Bright red blood per rectum followed by melena as documented in the ER  Continues with bright red clots per rectum   Likely lower vs upper GI bleed  Previous history of diverticular bleed, possible recurrence  Recently discharged from [facility] following right femur fracture status post internal fixation on Lovenox for 28 days  CT A/P: unremarkable for colitis or source of bleed   Lactic 2 0   · GI following, appreciate input   · Holding ASA and Lovenox  · Continue Protonix 40 mg IV BID  · Family does not want endoscopy unless it is a last resort option   · Family OK with pRBC transfusion as needed  · Consent obtained and on chart  · Will transfuse 1 unit of pRBCs for HGB of 6 9 gm/dL  · Monitor HGB Q8H

## 2019-06-24 NOTE — ASSESSMENT & PLAN NOTE
Supportive care, continue with Zoloft daily  PT/OT recommending STR on discharge - dtr will consider

## 2019-06-24 NOTE — UTILIZATION REVIEW
Initial Clinical Review    Admission: Date/Time/Statement:  06/23/2019 @ 1227  Orders Placed This Encounter   Procedures    Place in Observation     Standing Status:   Standing     Number of Occurrences:   1     Order Specific Question:   Admitting Physician     Answer:   Jacklyn De Anda [[id number]]     Order Specific Question:   Level of Care     Answer:   Med Surg [[id number]]     ED Arrival Information     Expected Arrival Acuity Means of Arrival Escorted By Service Admission Type    - 6/23/2019 11:39 Urgent Ambulance [facility] EMS Hospitalist Urgent    Arrival Complaint    -GI Bleed "large amount of bloody stool and c/o some SOB"        Chief Complaint   Patient presents with    GI Bleeding     EMS called to home due to pt having "a large amount of bloody stool"  Pt c/o some SOB  Assessment/Plan: 80year old male, presented to ED from Home via EMS  Admitted as OBSERVATION due to GI Bleed  Recent right hip surgery on lovenox, At 4:00 a m  today  patient had a black solid bowel movement with jelly like dark red blood  This was followed by multiple episodes of pure bright red blood  therefore she called EMS for further evaluation  Check CBC, admit to OBS   06/23/2019  Consult GI:   GI Bleeding/Anemia:  - dark stool with hematochezia and ? Brief episode of hemodynamic instability  Patient with chronic normocytic anemia baseline hemoglobin 8-12  Last hemoglobin 8 1 one month ago today's hemoglobin 8 8  Nno evidence of current active bleeding  Hemodynamically stable  BUN/Cr ratio not reliable due to history of CKD  Dark stool may be secondary to iron supplementation  At this juncture difficult to say whether or not this is upper versus lower GI bleeding; differential includes  ulcer, AVM, Dieulafoy's, erosive gastritis, from below includes diverticular bleeding as well as ischemic colitis  Recommend continued monitoring of hemoglobin every 8-12 hours and transfuse as needed  Check lactate  Check CT noncontrast to help identify any evidence of ischemic colitis particularly on the right side and if so recommend starting antibiotics  Patient's daughter who is power of  prefers to hold off on endoscopic intervention unless absolutely needed " as a last resort"  Continue supportive measures, if patient becomes hemodynamically unstable, has signs of overt active GI bleeding or continues to have drop in hemoglobin will consider endoscopic evaluation  Continue clear liquid diet      ED Triage Vitals   Temperature Pulse Respirations Blood Pressure SpO2   06/23/19 1151 06/23/19 1151 06/23/19 1151 06/23/19 1151 06/23/19 1151   97 5 °F (36 4 °C) 97 22 128/58 97 %      Temp Source Heart Rate Source Patient Position - Orthostatic VS BP Location FiO2 (%)   06/23/19 1151 06/23/19 1151 06/23/19 1151 06/23/19 1151 --   Oral Monitor Sitting Left arm       Pain Score       06/23/19 2157       9        Wt Readings from Last 1 Encounters:   06/24/19 66 7 kg (147 lb)     Additional Vital Signs:   06/23/19 2157  98 °F (36 7 °C)  76  20  157/70    100 %  Nasal cannula  Lying   06/23/19 1700  98 °F (36 7 °C)  100    136/59    97 %  Nasal cannula  Lying     06/24/19 0715  98 5 °F (36 9 °C)  102  18  139/63    94 %  None (Room air)  Lying     Pertinent Labs/Diagnostic Test Results:   Results from last 7 days   Lab Units 06/24/19  0148 06/23/19  1745 06/23/19  1157 06/23/19  1155   WBC Thousand/uL  --   --   --  11 35*   HEMOGLOBIN g/dL 7 3* 7 7*  --  8 8*   I STAT HEMOGLOBIN g/dl  --   --  9 5*  --    HEMATOCRIT %  --   --   --  30 5*   HEMATOCRIT, ISTAT %  --   --  28*  --    PLATELETS Thousands/uL  --   --   --  858*   NEUTROS ABS Thousands/µL  --   --   --  8 72*     Results from last 7 days   Lab Units 06/23/19  1357 06/23/19  1157   SODIUM mmol/L 140  --    POTASSIUM mmol/L 4 5  --    CHLORIDE mmol/L 110*  --    CO2 mmol/L 23  --    CO2, I-STAT mmol/L  --  22   AGAP mmol/L  --  14*   ANION GAP mmol/L 7  -- BUN mg/dL 27*  --    CREATININE mg/dL 1 34*  --    EGFR ml/min/1 73sq m 46 44   CALCIUM mg/dL 8 6  --    CALCIUM, IONIZED, ISTAT mmol/L  --  1 08*     Results from last 7 days   Lab Units 06/23/19  1357   AST U/L 16   ALT U/L 23   ALK PHOS U/L 97   TOTAL PROTEIN g/dL 7 1   ALBUMIN g/dL 2 9*   TOTAL BILIRUBIN mg/dL 0 46     Results from last 7 days   Lab Units 06/23/19  1357   GLUCOSE RANDOM mg/dL 139     Results from last 7 days   Lab Units 06/24/19  0148 06/23/19  1745   LACTIC ACID mmol/L 1 0 2 1*     06/23/2019 @ 2023 CT abd/pel:  Mild acute uncomplicated sigmoid diverticulitis  Cholelithiasis  Renal cysts         ED Treatment:   Medication Administration from 06/23/2019 1139 to 06/23/2019 1643       Date/Time Order Dose Route Action     06/23/2019 1609 ascorbic acid (VITAMIN C) tablet 250 mg 250 mg Oral Given     06/23/2019 1609 oxybutynin (DITROPAN) tablet 5 mg 5 mg Oral Given     06/23/2019 1609 predniSONE tablet 2 5 mg 2 5 mg Oral Given     06/23/2019 1451 sodium chloride 0 9 % infusion 50 mL/hr Intravenous New Bag     06/23/2019 1609 sertraline (ZOLOFT) tablet 50 mg 50 mg Oral Given        Past Medical History:   Diagnosis Date    Abnormal blood chemistry     resolved: 5/13/2017    Accidental puncture during procedure on organ 04/27/2011    accidental puncture/laceration during a procedure    Adenocarcinoma of prostate ([facility] [address] ) 11/02/2004    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    rheumatoid arthritis    Bilateral edema of lower extremity     last assessed-7/1/2015    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    Coronary artery disease     Dementia     Gait dyspraxia     last assessed-1/26/2017    Gastrointestinal hemorrhage     last assessed-2/15/2017    Hematochezia     resolved: 2/15/2017    Herpes zoster 12/07/2011    History of degenerative disc disease     resolved: 5/13/2017    History of methicillin resistant staphylococcus aureus (MRSA)     nasal swab negative 5/15/2019-Contact isolation discontinued per protocol    Hyperlipidemia     Hypertension     Incontinence of feces     last assessed-11/17/2016    Inflamed seborrheic keratosis 11/30/2006    Neoplasm of bone 11/20/2007    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    Osteoarthritis     resolved: 10/1/2017    [facility]) [facility]     Paronychia of toe     last assessed-8/6/2013-unspecified laterality    Rectal bleeding     resolved-7/30/2017    Rheumatoid arthritis involving both hands ([facility] )     Sigmoid diverticulitis     last assessed-1/26/2017    Squamous cell carcinoma of cheek     resolved-7/30/2017    Tinea corporis 06/04/2008    Transient disorder of initiating or maintaining sleep 05/21/2010    Vertigo, benign paroxysmal, unspecified laterality 07/09/2012    Xerosis cutis     last assessed-8/6/2013     Present on Admission:   GI bleed   Hypertension   Dementia   CKD (chronic kidney disease) stage 3, GFR 30-59 ml/min ([facility]   Rheumatoid arthritis ([facility] )   Urinary retention      Admitting Diagnosis: GI bleed [K92 2]  Chronic anemia [D64 9]  Age/Sex: 80 y o  male  Admission Orders:  Current Facility-Administered Medications:  acetaminophen 650 mg Oral Q6H PRN   vitamin C 250 mg Oral Daily   atorvastatin 40 mg Oral QPM   docusate sodium 100 mg Oral BID   ferrous sulfate 325 mg Oral Daily With Breakfast   gabapentin 300 mg Oral HS   metoprolol tartrate 25 mg Oral BID   oxybutynin 5 mg Oral TID   oxybutynin 5 mg Oral Daily   pantoprazole 40 mg Intravenous Q12H [address]   polyethylene glycol 17 g Oral Daily   predniSONE 2 5 mg Oral Daily   saccharomyces boulardii 250 mg Oral BID   senna 1 tablet Oral HS   sertraline 50 mg Oral Daily     Clear liq diet  Aspiration precautions  Fall precautions  Hgl q8h  Consult PT/OT  IP CONSULT TO CASE MANAGEMENT:  post acute placement  IP CONSULT TO GASTROENTEROLOGY:  bleed  Andres SCD[employer]  Network Utilization Review Department  Phone: [phone];  Fax [phone]  [email or web address]  ATTENTION: Please call with any questions or concerns to [phone]  and carefully listen to the prompts so that you are directed to the right person  Send all requests for admission clinical reviews, approved or denied determinations and any other requests to fax [phone]  All voicemails are confidential     ============================================================================    OBSERVATION 06/23/2019 @ 1227, CONVERTED TO INPATIENT ADMISSION 06/24/2019 @ 1326, DUE TO FURTHER DIAGNOSTIC WORKUP, REQUIRING AT LEAST 2 MIDNIGHT STAY  Continues with generalized abdominal discomfort, especially suprapubic region  Bloody BM today  06/24/2019 @ 0828  Hgl 6 9, Will transfuse 1 unit of pRBCs for HGB of 6 9 gm/dL, Monitor Hgl q8h    06/24/19 1326  Inpatient Admission Once     Comments: Active GI bleed, decreased HGB, needs pRBC transfusion   Urinary retention   Transfer Service: General Medicine    Expected Discharge Date: 06/26/19       Question Answer Comment   Admitting Physician Yadira Richardson    Level of Care Med Surg    Estimated length of stay More than 2 Midnights    Certification I certify that inpatient services are medically necessary for this patient for a duration of greater than two midnights  See H&P and MD Progress Notes for additional information about the patient's course of treatment

## 2019-06-24 NOTE — PLAN OF CARE
Problem: PHYSICAL THERAPY ADULT  Goal: Performs mobility at highest level of function for planned discharge setting  See evaluation for individualized goals  Description  Treatment/Interventions: Functional transfer training, LE strengthening/ROM, Endurance training, Therapeutic exercise, Bed mobility, Spoke to case management, Spoke to nursing, OT          See flowsheet documentation for full assessment, interventions and recommendations  Note:   Prognosis: Good  Problem List: Decreased strength, Decreased endurance, Impaired balance, Decreased mobility, Decreased coordination, Decreased cognition, Decreased safety awareness, Impaired judgement  Assessment: Pt is a 81 yo male presenting to [facility] on 6/23/19 with GI bleed  Of note, pt recently discharged home on 6/21/19 from [facility] significant for: chronic anemia, previous CVA, right femur fracture status post internal fixation, memory loss/dementia, atrial fibrillation  Pt is supine at start of session and agreeable to therapy  Pt is lethargic, requiring frequent cues for arousal and participation in evaluation  Pt transferred supine <> sit with maxA x2  Pt transferred sit <> stand with maxA x2 and hand held assist  Upon standing, pt given cues for side steps, pt unable to advance B/L LE despite verbal and tactile cues from therapist  Pt returned to supine and remained supine with all needs within reach  PT to recommend inpt rehab to maximize safety and independence with functional mobility  PT to follow  Barriers to Discharge: Decreased caregiver support, Inaccessible home environment     Recommendation: (S) Post acute IP rehab     PT - OK to Discharge: (S) Yes(To rehab when medically stable)    See flowsheet documentation for full assessment        Jennifer Cleaning, PT, DPT

## 2019-06-24 NOTE — SOCIAL WORK
CM met w/ pt at bedside briefly & then spoke to Daughter/POA/Janice [phone]  Tiffany Quan states that pt lives w/ his wife/her mother Jony Labor in 2 story home w / 2 steps to enter & wheelchair ramp  Bed & bath on 1st floor  Pt needs assistance w/  ADLS & uses rolling walker & cane for ambulation, which he has, along w/ bedside commode & wheelchair  Pt currently has had [facility], just d/c from [facility] on 6/21  Pt uses [facility] in [place] for pharmacy needs  PC  Dr Ricardo Tong  Pt is retired  No psych or D & A Admission history  PT OT evals are needed  Harshadmohsen Ludwig wants w/c Marguerite espinosa to home & is aware & agrees to costs  CM reviewed d/c planning process including the following: identifying help at home, patient preference for d/c planning needs, Discharge Lounge, [facility] Meds to Bed program, availability of treatment team to discuss questions or concerns patient and/or family may have regarding understanding medications and recognizing signs and symptoms once discharged  CM also encouraged patient to follow up with all recommended appointments after discharge  Patient advised of importance for patient and family to participate in managing patients medical well being  Discharge checklist discussed with family

## 2019-06-24 NOTE — ASSESSMENT & PLAN NOTE
Cr 1 3 with a baseline creatinine between 1 2 and 1 4  · Avoid nephrotoxic agents and relative hypotension  · Follow-up pending BMP

## 2019-06-24 NOTE — PHYSICAL THERAPY NOTE
Physical Therapy Evaluation     Patient's Name: Josué Donovan    Admitting Diagnosis  GI bleed [K92 2]  Chronic anemia [D64 9]    Problem List  Patient Active Problem List   Diagnosis    GI bleed    Weakness    Hypertension    CAD (coronary artery disease)    [facility]AD [facility]) [facility]    Hyperlipidemia LDL goal <100    Back pain    Anemia, acute on chronic    Dementia    Idiopathic peripheral neuropathy    JENNYFER (acute kidney injury) ([facility] )    Actinic keratosis    Adhesive capsulitis of right shoulder    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    Allergic rhinitis    Arthropathy of multiple sites    Benign colon polyp    Benign essential hypertension    Chronic back pain    Diaphragmatic hernia    Gout    Hemorrhoids    Iron deficiency anemia due to chronic blood loss    Lumbar disc disease    Mitral valve disorder    Depression    Overactive bladder    Prediabetes    Rheumatoid arthritis ([facility]    Tricuspid valve disorders, non-rheumatic    Immunization due    Pain in both feet    Peripheral arteriosclerosis ([facility]    Onychomycosis    Paronychia of toenail    Tinea pedis of both feet    CVA (cerebral vascular accident) ([facility]    Normocytic anemia    Displaced intertrochanteric fracture of right femur, initial encounter for closed fracture ([facility] )    Preoperative examination    Aortic stenosis, mild    Abnormal TSH    CKD (chronic kidney disease) stage 3, GFR 30-59 ml/min ([facility] )    Urinary retention       Past Medical History  Past Medical History:   Diagnosis Date    Abnormal blood chemistry     resolved: 5/13/2017    Accidental puncture during procedure on organ 04/27/2011    accidental puncture/laceration during a procedure    Adenocarcinoma of prostate ([facility] ) 11/02/2004    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    rheumatoid arthritis    Bilateral edema of lower extremity     last assessed-7/1/2015    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    Coronary artery disease     Dementia     Gait dyspraxia     last assessed-1/26/2017    Gastrointestinal hemorrhage     last assessed-2/15/2017    Hematochezia     resolved: 2/15/2017    Herpes zoster 12/07/2011    History of degenerative disc disease     resolved: 5/13/2017    History of methicillin resistant staphylococcus aureus (MRSA)     nasal swab negative 5/15/2019-Contact isolation discontinued per protocol    Hyperlipidemia     Hypertension     Incontinence of feces     last assessed-11/17/2016    Inflamed seborrheic keratosis 11/30/2006    Neoplasm of bone 11/20/2007    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    Osteoarthritis     resolved: 10/1/2017    [facility]     Paronychia of toe     last assessed-8/6/2013-unspecified laterality    Rectal bleeding     resolved-7/30/2017    Rheumatoid arthritis involving both hands [facility]     Sigmoid diverticulitis     last assessed-1/26/2017    Squamous cell carcinoma of cheek     resolved-7/30/2017    Tinea corporis 06/04/2008    Transient disorder of initiating or maintaining sleep 05/21/2010    Vertigo, benign paroxysmal, unspecified laterality 07/09/2012    Xerosis cutis     last assessed-8/6/2013       Past Surgical History  Past Surgical History:   Procedure Laterality Date    COLONOSCOPY N/A 1/19/2017    Procedure: COLONOSCOPY;  Surgeon: Felicia Valdes MD;  Location: [facility] GI LAB; Service:     COLONOSCOPY N/A 1/18/2017    Procedure: COLONOSCOPY;  Surgeon: Felicia Valdes MD;  Location: [facility] GI LAB; Service:     COLONOSCOPY N/A 6/26/2017    Procedure: COLONOSCOPY;  Surgeon: Darling Price MD;  Location: [facility] GI LAB;   Service: Gastroenterology    CORONARY ARTERY BYPASS GRAFT      NM OPEN RX FEMUR FX+INTRAMED DELON Right 5/28/2019    Procedure: INSERTION NAIL IM FEMUR ANTEGRADE (TROCHANTERIC), RIGHT;  Surgeon: Bernadette Dunlap MD;  Location:  MAIN OR;  Service: Orthopedics          06/24/19 1015   Note Type   Note type Eval only   Pain Assessment   Pain Assessment FLACC   Pain Rating: FLACC (Rest) - Face 0   Pain Rating: FLACC (Rest) - Legs 0   Pain Rating: FLACC (Rest) - Activity 0   Pain Rating: FLACC (Rest) - Cry 0   Pain Rating: FLACC (Rest) - Consolability 0   Score: FLACC (Rest) 0   Pain Rating: FLACC (Activity) - Face 0   Pain Rating: FLACC (Activity) - Legs 0   Pain Rating: FLACC (Activity) - Activity 0   Pain Rating: FLACC (Activity) - Cry 0   Pain Rating: FLACC (Activity) - Consolability 0   Score: FLACC (Activity) 0   Home Living   Type of Home House   Home Layout Two level   Home Equipment Walker;Cane  (Pt reports SPC as primary AD)   Additional Comments Pt is lethargic and a questionable historian  Pt with difculty recalling PLOF, able to report SPC as primary AD   Prior Function   Level of [place] Needs assistance with ADLs and functional mobility; Needs assistance with IADLs   Lives With Significant other   Receives Help From Family   ADL Assistance Needs assistance   IADLs Needs assistance   Comments Pt is lethargic and a questionable historian  Pt with difculty recalling PLOF, able to report SPC as primary AD  Pt reports his wife assists him at home, level of assistance is unclear   Restrictions/Precautions   Weight Bearing Precautions Per Order No   Other Precautions Cognitive; Bed Alarm;Telemetry; Fall Risk   General   Family/Caregiver Present No   Cognition   Overall Cognitive Status Impaired   Arousal/Participation Lethargic   Orientation Level Oriented to person;Oriented to place; Disoriented to time;Disoriented to situation   Following Commands Follows one step commands with increased time or repetition   Comments Pt is lethargic, requiring frequent cues for arousal and participation in evaluation     RLE Assessment   RLE Assessment   (Minimally 3/5, pt with difficulty following MMT procedure)   LLE Assessment   LLE Assessment   (Minimally 3/5, pt with difficulty following MMT procedure)   Light Touch   RLE Light Touch Grossly intact   LLE Light Touch Grossly intact   Bed Mobility   Rolling R 2  Maximal assistance   Additional items Assist x 1;Bedrails; Increased time required;Verbal cues   Rolling L 2  Maximal assistance   Additional items Assist x 1;Bedrails; Increased time required;Verbal cues   Supine to Sit 2  Maximal assistance   Additional items Assist x 2; Increased time required;Verbal cues;LE management   Sit to Supine 2  Maximal assistance   Additional items Assist x 2; Increased time required;Verbal cues;LE management   Transfers   Sit to Stand 2  Maximal assistance   Additional items Assist x 2; Increased time required;Verbal cues   Stand to Sit 2  Maximal assistance   Additional items Assist x 2; Increased time required;Verbal cues   Ambulation/Elevation   Gait pattern   (Unable to initiate side steps)   Balance   Static Sitting Fair -   Dynamic Sitting Poor +   Static Standing Poor -   Dynamic Standing Poor -   Endurance Deficit   Endurance Deficit Yes   Endurance Deficit Description Lethargy, weakness, fatigue   Activity Tolerance   Activity Tolerance Patient limited by fatigue   Medical Staff Made Aware OT, CM   Nurse Made Aware Yes   Assessment   Prognosis Good   Problem List Decreased strength;Decreased endurance; Impaired balance;Decreased mobility; Decreased coordination;Decreased cognition;Decreased safety awareness; Impaired judgement   Assessment Pt is a 79 yo male presenting to [facility] on 6/23/19 with GI bleed  Of note, [facility] significant for: chronic anemia, previous CVA, right femur fracture status post internal fixation, memory loss/dementia, atrial fibrillation  Pt is supine at start of session and agreeable to therapy  Pt is lethargic, requiring frequent cues for arousal and participation in evaluation  Pt transferred supine <> sit with maxA x2   Pt transferred sit <> stand with maxA x2 and hand held assist  Upon standing, pt given cues for side steps, pt unable to advance B/L LE despite verbal and tactile cues from therapist  Pt returned to supine and remained supine with all needs within reach  PT to recommend inpt rehab to maximize safety and independence with functional mobility  PT to follow   Barriers to Discharge Decreased caregiver support; Inaccessible home environment   Goals   Patient Goals To rest   STG Expiration Date 07/08/19   Short Term Goal #1 In 10 sessions pt will: 1  Roll R<>L with David  2  Transfer supine <> sit with David  3  Transfer sit <> stand with modA and LRAD  4  Transfer to bedside chair with [address] and [address]  4  Ambulate >5ft with maxA and LRAD  5  Perform LE exercise program    Treatment Day 0   Plan   Treatment/Interventions Functional transfer training;LE strengthening/ROM; Endurance training; Therapeutic exercise; Bed mobility;Spoke to case management;Spoke to nursing;OT   PT Frequency   (3-5x/wk)   Recommendation   Recommendation Post acute IP rehab   PT - OK to Discharge Yes  (To rehab when medically stable)   Modified Fleming Scale   Modified Fleming Scale 5   Barthel Index   Feeding 5   Bathing 0   Grooming Score 0   Dressing Score 0   Bladder Score 0   Bowels Score 0   Toilet Use Score 0   Transfers (Bed/Chair) Score 5   Mobility (Level Surface) Score 0   Stairs Score 0   Barthel Index Score 10       Cece Brown, PT, DPT

## 2019-06-25 LAB
ABO GROUP BLD BPU: NORMAL
ALBUMIN SERPL BCP-MCNC: 2.4 G/DL (ref 3.5–5)
ALP SERPL-CCNC: 72 U/L (ref 46–116)
ALT SERPL W P-5'-P-CCNC: 16 U/L (ref 12–78)
ANION GAP SERPL CALCULATED.3IONS-SCNC: 7 MMOL/L (ref 4–13)
AST SERPL W P-5'-P-CCNC: 12 U/L (ref 5–45)
BASOPHILS # BLD AUTO: 0.05 THOUSANDS/ΜL (ref 0–0.1)
BASOPHILS NFR BLD AUTO: 1 % (ref 0–1)
BILIRUB SERPL-MCNC: 0.5 MG/DL (ref 0.2–1)
BPU ID: NORMAL
BUN SERPL-MCNC: 41 MG/DL (ref 5–25)
CALCIUM SERPL-MCNC: 8.2 MG/DL (ref 8.3–10.1)
CHLORIDE SERPL-SCNC: 112 MMOL/L (ref 100–108)
CO2 SERPL-SCNC: 22 MMOL/L (ref 21–32)
CREAT SERPL-MCNC: 1.48 MG/DL (ref 0.6–1.3)
CROSSMATCH: NORMAL
EOSINOPHIL # BLD AUTO: 0.05 THOUSAND/ΜL (ref 0–0.61)
EOSINOPHIL NFR BLD AUTO: 1 % (ref 0–6)
ERYTHROCYTE [DISTWIDTH] IN BLOOD BY AUTOMATED COUNT: 13.4 % (ref 11.6–15.1)
ERYTHROCYTE [DISTWIDTH] IN BLOOD BY AUTOMATED COUNT: 17.5 % (ref 11.6–15.1)
GFR SERPL CREATININE-BSD FRML MDRD: 41 ML/MIN/1.73SQ M
GLUCOSE SERPL-MCNC: 107 MG/DL (ref 65–140)
HCT VFR BLD AUTO: 22.3 % (ref 36.5–49.3)
HCT VFR BLD AUTO: 22.8 % (ref 36.5–49.3)
HCT VFR BLD AUTO: 23.4 % (ref 36.5–49.3)
HCT VFR BLD AUTO: 45.3 % (ref 36.5–49.3)
HCT VFR BLD AUTO: 45.3 % (ref 36.5–49.3)
HGB BLD-MCNC: 13.6 G/DL (ref 12–17)
HGB BLD-MCNC: 13.8 G/DL (ref 12–17)
HGB BLD-MCNC: 6.7 G/DL (ref 12–17)
HGB BLD-MCNC: 6.9 G/DL (ref 12–17)
HGB BLD-MCNC: 7 G/DL (ref 12–17)
HGB BLD-MCNC: 7.2 G/DL (ref 12–17)
IMM GRANULOCYTES # BLD AUTO: 0.03 THOUSAND/UL (ref 0–0.2)
IMM GRANULOCYTES NFR BLD AUTO: 0 % (ref 0–2)
LACTATE SERPL-SCNC: 4 MMOL/L (ref 0.5–2)
LYMPHOCYTES # BLD AUTO: 0.25 THOUSANDS/ΜL (ref 0.6–4.47)
LYMPHOCYTES NFR BLD AUTO: 3 % (ref 14–44)
MAGNESIUM SERPL-MCNC: 2.3 MG/DL (ref 1.6–2.6)
MCH RBC QN AUTO: 28.5 PG (ref 26.8–34.3)
MCH RBC QN AUTO: 30.1 PG (ref 26.8–34.3)
MCHC RBC AUTO-ENTMCNC: 30.5 G/DL (ref 31.4–37.4)
MCHC RBC AUTO-ENTMCNC: 30.9 G/DL (ref 31.4–37.4)
MCV RBC AUTO: 92 FL (ref 82–98)
MCV RBC AUTO: 99 FL (ref 82–98)
MONOCYTES # BLD AUTO: 0.76 THOUSAND/ΜL (ref 0.17–1.22)
MONOCYTES NFR BLD AUTO: 10 % (ref 4–12)
NEUTROPHILS # BLD AUTO: 6.8 THOUSANDS/ΜL (ref 1.85–7.62)
NEUTS SEG NFR BLD AUTO: 85 % (ref 43–75)
NRBC BLD AUTO-RTO: 0 /100 WBCS
PHOSPHATE SERPL-MCNC: 4 MG/DL (ref 2.3–4.1)
PLATELET # BLD AUTO: 343 THOUSANDS/UL (ref 149–390)
PLATELET # BLD AUTO: 613 THOUSANDS/UL (ref 149–390)
PMV BLD AUTO: 10.4 FL (ref 8.9–12.7)
PMV BLD AUTO: 9.8 FL (ref 8.9–12.7)
POTASSIUM SERPL-SCNC: 4.2 MMOL/L (ref 3.5–5.3)
PROT SERPL-MCNC: 5.8 G/DL (ref 6.4–8.2)
RBC # BLD AUTO: 2.42 MILLION/UL (ref 3.88–5.62)
RBC # BLD AUTO: 4.58 MILLION/UL (ref 3.88–5.62)
SODIUM SERPL-SCNC: 141 MMOL/L (ref 136–145)
UNIT DISPENSE STATUS: NORMAL
UNIT PRODUCT CODE: NORMAL
UNIT RH: NORMAL
WBC # BLD AUTO: 11.32 THOUSAND/UL (ref 4.31–10.16)
WBC # BLD AUTO: 7.94 THOUSAND/UL (ref 4.31–10.16)

## 2019-06-25 PROCEDURE — 85018 HEMOGLOBIN: CPT | Performed by: PHYSICIAN ASSISTANT

## 2019-06-25 PROCEDURE — P9016 RBC LEUKOCYTES REDUCED: HCPCS

## 2019-06-25 PROCEDURE — 80053 COMPREHEN METABOLIC PANEL: CPT | Performed by: NURSE PRACTITIONER

## 2019-06-25 PROCEDURE — 84100 ASSAY OF PHOSPHORUS: CPT | Performed by: NURSE PRACTITIONER

## 2019-06-25 PROCEDURE — 83605 ASSAY OF LACTIC ACID: CPT | Performed by: PHYSICIAN ASSISTANT

## 2019-06-25 PROCEDURE — 85027 COMPLETE CBC AUTOMATED: CPT | Performed by: NURSE PRACTITIONER

## 2019-06-25 PROCEDURE — 85014 HEMATOCRIT: CPT | Performed by: PHYSICIAN ASSISTANT

## 2019-06-25 PROCEDURE — 99233 SBSQ HOSP IP/OBS HIGH 50: CPT | Performed by: NURSE PRACTITIONER

## 2019-06-25 PROCEDURE — 87040 BLOOD CULTURE FOR BACTERIA: CPT | Performed by: PHYSICIAN ASSISTANT

## 2019-06-25 PROCEDURE — C9113 INJ PANTOPRAZOLE SODIUM, VIA: HCPCS | Performed by: INTERNAL MEDICINE

## 2019-06-25 PROCEDURE — 83735 ASSAY OF MAGNESIUM: CPT | Performed by: NURSE PRACTITIONER

## 2019-06-25 PROCEDURE — 85018 HEMOGLOBIN: CPT | Performed by: NURSE PRACTITIONER

## 2019-06-25 PROCEDURE — 85025 COMPLETE CBC W/AUTO DIFF WBC: CPT | Performed by: PHYSICIAN ASSISTANT

## 2019-06-25 RX ORDER — DIPHENHYDRAMINE HYDROCHLORIDE 50 MG/ML
12.5 INJECTION INTRAMUSCULAR; INTRAVENOUS ONCE
Status: COMPLETED | OUTPATIENT
Start: 2019-06-25 | End: 2019-06-25

## 2019-06-25 RX ADMIN — ACETAMINOPHEN 650 MG: 325 TABLET ORAL at 17:45

## 2019-06-25 RX ADMIN — FERROUS SULFATE TAB 325 MG (65 MG ELEMENTAL FE) 325 MG: 325 (65 FE) TAB at 07:36

## 2019-06-25 RX ADMIN — METOPROLOL TARTRATE 25 MG: 25 TABLET, FILM COATED ORAL at 07:54

## 2019-06-25 RX ADMIN — SODIUM CHLORIDE 75 ML/HR: 4.5 INJECTION, SOLUTION INTRAVENOUS at 05:55

## 2019-06-25 RX ADMIN — SERTRALINE HYDROCHLORIDE 50 MG: 50 TABLET ORAL at 07:53

## 2019-06-25 RX ADMIN — DOCUSATE SODIUM 100 MG: 100 CAPSULE, LIQUID FILLED ORAL at 07:52

## 2019-06-25 RX ADMIN — PANTOPRAZOLE SODIUM 40 MG: 40 INJECTION, POWDER, FOR SOLUTION INTRAVENOUS at 07:54

## 2019-06-25 RX ADMIN — Medication 250 MG: at 07:51

## 2019-06-25 RX ADMIN — Medication 250 MG: at 17:12

## 2019-06-25 RX ADMIN — SODIUM CHLORIDE 250 ML: 0.9 INJECTION, SOLUTION INTRAVENOUS at 20:00

## 2019-06-25 RX ADMIN — PANTOPRAZOLE SODIUM 40 MG: 40 INJECTION, POWDER, FOR SOLUTION INTRAVENOUS at 22:40

## 2019-06-25 RX ADMIN — GABAPENTIN 300 MG: 300 CAPSULE ORAL at 22:40

## 2019-06-25 RX ADMIN — DIPHENHYDRAMINE HYDROCHLORIDE 12.5 MG: 50 INJECTION, SOLUTION INTRAMUSCULAR; INTRAVENOUS at 18:12

## 2019-06-25 RX ADMIN — Medication 250 MG: at 07:53

## 2019-06-25 RX ADMIN — ATORVASTATIN CALCIUM 40 MG: 40 TABLET, FILM COATED ORAL at 17:12

## 2019-06-25 RX ADMIN — METOPROLOL TARTRATE 25 MG: 25 TABLET, FILM COATED ORAL at 17:12

## 2019-06-25 RX ADMIN — SENNOSIDES 8.6 MG: 8.6 TABLET, FILM COATED ORAL at 22:40

## 2019-06-25 RX ADMIN — PREDNISONE 2.5 MG: 2.5 TABLET ORAL at 07:52

## 2019-06-25 RX ADMIN — OXYBUTYNIN CHLORIDE 5 MG: 5 TABLET, EXTENDED RELEASE ORAL at 08:24

## 2019-06-25 NOTE — ASSESSMENT & PLAN NOTE
· Reported by family, did complain of suprapubic discomfort yesterday on exam but denied on today's exam  · Following urinary retention protocol  · Per discussion with nursing was incontinent of small amount a urine and scan showed >400 mL urine noted and he was straight cathed for 450  · Monitor I+O  · If patient requires Sepulveda recommend urology consultation  · Continued did Herb

## 2019-06-25 NOTE — PROGRESS NOTES
Progress Note - Julissa Zaragoza 6/27/1927, 80 y o  male MRN: [id number]    Unit/Bed#: CW2 213-01 Encounter: [id number]    Primary Care Provider: Sara Lilly DO   Date and time admitted to hospital: 6/23/2019 11:40 AM        * GI bleed  Assessment & Plan  · Presented with bright red blood per rectum follow-up by melena as documented in medical record  · He denies nausea vomiting abdominal pain, he is unable to tell me if he has had any blood per rectum and daughter at bedside stated that he had been incontinent of stool at home  · Continues with blood per rectum as reported by nursing  · CT of the abdomen and pelvis with mild acute uncomplicated sigmoid diverticulitis, cholelithiasis, renal cyst  · Hemoglobin being monitored every 8 hours:  Did receive 1 unit of packed RBCs on admission  · Continue clear liquid diet  · Appreciate GI consultation he has history of diverticular GI bleed;  given stable hemoglobin per their consultation patient does not appear to have active GI bleeding  GI bleeding could be secondary to ischemia versus diverticulosis bleeding  · Lactic acid, 2 1, improved to 1 0  · Will continue to check hemoglobin every 8 hours, transfuse if symptomatic and/or less than 7 0   · Discussed with patient's daughter today as he was noted to have a bloody bowel movement, she wishes to continue to observe his blood counts and not proceed with any invasive procedures or workup     · Holding ASA and Lovenox  · Continue Protonix 40 mg IV BID  · Family does not want endoscopy unless it is a last resort option   · Family OK with PRBC transfusion as needed    Urinary retention  Assessment & Plan  · Reported by family, did complain of suprapubic discomfort yesterday on exam but denied on today's exam  · Following urinary retention protocol  · Per discussion with nursing was incontinent of small amount a urine and scan showed >400 mL urine noted and he was straight cathed for 450  · Monitor I+O  · If patient requires Sepulveda recommend urology consultation  · Continued did Triptan    CKD (chronic kidney disease) stage 3, GFR 30-59 ml/min ([facility])  Assessment & Plan  · Cr 1 48 with a baseline creatinine between 1 2 and 1 4  · Avoid nephrotoxic agents and hypotension  · Monitor BMP    Rheumatoid arthritis (HCC)  Assessment & Plan  · Continue Prednisone 2 5 mg daily    Dementia  Assessment & Plan  · Supportive care, continue with Zoloft daily  · PT/OT recommending STR on discharge -daughter to consider     Hypertension  Assessment & Plan  · Blood pressure acceptable  · Continue Lopressor with holding parameters       VTE Pharmacologic Prophylaxis:   Pharmacologic: Pharmacologic VTE Prophylaxis contraindicated due to GI bleed  Mechanical VTE Prophylaxis in Place: Yes    Patient Centered Rounds: I have performed bedside rounds with nursing staff today  Discussions with Specialists or Other Care Team Provider:  Case management    Education and Discussions with Family / Patient:  Patient and daughter at bedside    Time Spent for Care: 30 minutes  More than 50% of total time spent on counseling and coordination of care as described above  Current Length of Stay: 1 day(s)    Current Patient Status: Inpatient   Certification Statement: The patient will continue to require additional inpatient hospital stay due to Ongoing blood per rectum needing serial labs    Discharge Plan:  Unclear discharge Plan may require rehab versus being discharged home to his daughter's house    Code Status: Level 3 - DNAR and DNI      Subjective:   Per nursing  Noted that he is voiding little amounts but bladder scan showed after he was incontinent that he had> 400 mL of retention noted  He required straight catheterization  Appetite is good for liquid diet  No nausea or vomiting  No headache dizziness  No abdominal pain    Patient unable to tell me if he had a bowel movement blood per discussion with nursing he did have a bowel movement that was noted to be with blood  No chest pain shortness of breath    Objective:     Vitals:   Temp (24hrs), Av 5 °F (36 9 °C), Min:97 9 °F (36 6 °C), Max:99 4 °F (37 4 °C)    Temp:  [97 9 °F (36 6 °C)-99 4 °F (37 4 °C)] 99 4 °F (37 4 °C)  HR:  [44-91] 83  Resp:  [16-20] 16  BP: (116-161)/(60-69) 161/65  SpO2:  [97 %-100 %] 98 %  Body mass index is 21 91 kg/m²  Input and Output Summary (last 24 hours): Intake/Output Summary (Last 24 hours) at 2019 1552  Last data filed at 2019 0902  Gross per 24 hour   Intake 1253 75 ml   Output 630 ml   Net 623 75 ml       Physical Exam:     Physical Exam   Constitutional: He is oriented to person, place, and time  He appears well-developed  No distress  Chronically ill frail-appearing   HENT:   Head: Normocephalic and atraumatic  Mouth/Throat: Oropharynx is clear and moist    Neck: Normal range of motion  Neck supple  Cardiovascular: Normal rate and regular rhythm  No murmur heard  Pulmonary/Chest: Effort normal and breath sounds normal  No respiratory distress  Abdominal: Soft  Bowel sounds are normal  He exhibits no distension  Musculoskeletal: Normal range of motion  He exhibits no edema  Neurological: He is alert and oriented to person, place, and time  Forgetful   Skin: Skin is warm and dry  Capillary refill takes less than 2 seconds  There is pallor  Psychiatric: He has a normal mood and affect   His behavior is normal        Additional Data:     Labs:    Results from last 7 days   Lab Units 19  0621 19  0457  19  1155   WBC Thousand/uL  --  11 32*  --  11 35*   HEMOGLOBIN g/dL 7 0* 6 9*   < > 8 8*   I STAT HEMOGLOBIN   --   --    < >  --    HEMATOCRIT % 22 8* 22 3*   < > 30 5*   HEMATOCRIT, ISTAT   --   --    < >  --    PLATELETS Thousands/uL  --  613*  --  858*   NEUTROS PCT %  --   --   --  76*   LYMPHS PCT %  --   --   --  7*   MONOS PCT %  --   --   --  12   EOS PCT %  --   --   --  3    < > = values in this interval not displayed  Results from last 7 days   Lab Units 06/25/19  0457  06/23/19  1157   POTASSIUM mmol/L 4 2   < >  --    CHLORIDE mmol/L 112*   < >  --    CO2 mmol/L 22   < >  --    CO2, I-STAT mmol/L  --   --  22   BUN mg/dL 41*   < >  --    CREATININE mg/dL 1 48*   < >  --    CALCIUM mg/dL 8 2*   < >  --    ALK PHOS U/L 72   < >  --    ALT U/L 16   < >  --    AST U/L 12   < >  --    GLUCOSE, ISTAT mg/dl  --   --  157*    < > = values in this interval not displayed  * I Have Reviewed All Lab Data Listed Above  * Additional Pertinent Lab Tests Reviewed: Zoie [facility] Admission Reviewed    Imaging:    Imaging Reports Reviewed Today Include:  CAT Scan abdomen pelvis  Imaging Personally Reviewed by Myself Includes:  None    Recent Cultures (last 7 days):           Last 24 Hours Medication List:     Current Facility-Administered Medications:  acetaminophen 650 mg Oral Q6H PRN Hiwot Armstrong MD    vitamin C 250 mg Oral Daily Hiwot Armstrong MD    atorvastatin 40 mg Oral QPM Hiwot Armstrong MD    docusate sodium 100 mg Oral BID Hiwot Armstrong MD    ferrous sulfate 325 mg Oral Daily With Breakfast Hiwot Armstrong MD    gabapentin 300 mg Oral HS Hiwot Armstrong MD    metoprolol tartrate 25 mg Oral BID Hiwot Armstrong MD    oxybutynin 5 mg Oral Daily Jennifer Pollock MD    pantoprazole 40 mg Intravenous Q12H [address] Hiwot Armstrong MD    polyethylene glycol 17 g Oral Daily Hiwot Armstrong MD    predniSONE 2 5 mg Oral Daily Hiwot Armstrong MD    saccharomyces boulardii 250 mg Oral BID Hiwot Armstrong MD    senna 1 tablet Oral HS Hiwot Armstrong MD    sertraline 50 mg Oral Daily Hiwot Armstrong MD    sodium chloride 75 mL/hr Intravenous Continuous Phyllis Holley PA-C Last Rate: 75 mL/hr (06/25/19 0555)        Today, Patient Was Seen By: MAZIN Lewis    ** Please Note: Dictation voice to text software may have been used in the creation of this document  **

## 2019-06-25 NOTE — QUICK NOTE
Called to bedside by patient's RN 2/2 T of 101 during blood transfusion as well as bloody BM  He is admitted with GI bleed  Per RN noted to have low grade T of 100 prior to blood transfusion  Seen and examined at bedside  VS: /71 HR 97 T (R) 101 8   Patient has dementia however denies any acute complaints  No wheezing or rash noted on exam, no rigors  Per chart review low grade temps documented earlier as well as T 96 6 earlier today  WBC 11 today  Questionable transfusion reaction- medicate with benadryl and tylenol  Check LA, CBC, BC x 2

## 2019-06-25 NOTE — QUICK NOTE
Patient hemoglobin noted to be 6 7  He did have a bloody bowel movement x1 today  Will transfuse with 1 unit of packed RBCs  Daughter aware, stated to continue to do what we are doing by transfusing him she is concerned that he will not be able to tolerate a colonoscopy in going through the prep

## 2019-06-25 NOTE — ASSESSMENT & PLAN NOTE
· Presented with bright red blood per rectum follow-up by melena as documented in medical record  · He denies nausea vomiting abdominal pain, he is unable to tell me if he has had any blood per rectum and daughter at bedside stated that he had been incontinent of stool at home  · Continues with blood per rectum as reported by nursing  · CT of the abdomen and pelvis with mild acute uncomplicated sigmoid diverticulitis, cholelithiasis, renal cyst  · Hemoglobin being monitored every 8 hours:  Did receive 1 unit of packed RBCs on admission  · Continue clear liquid diet  · Appreciate GI consultation he has history of diverticular GI bleed;  given stable hemoglobin per their consultation patient does not appear to have active GI bleeding  GI bleeding could be secondary to ischemia versus diverticulosis bleeding  · Lactic acid, 2 1, improved to 1 0  · Will continue to check hemoglobin every 8 hours, transfuse if symptomatic and/or less than 7 0   · Discussed with patient's daughter today as he was noted to have a bloody bowel movement, she wishes to continue to observe his blood counts and not proceed with any invasive procedures or workup     · Holding ASA and Lovenox  · Continue Protonix 40 mg IV BID  · Family does not want endoscopy unless it is a last resort option   · Family OK with PRBC transfusion as needed

## 2019-06-25 NOTE — ASSESSMENT & PLAN NOTE
· Cr 1 48 with a baseline creatinine between 1 2 and 1 4  · Avoid nephrotoxic agents and hypotension  · Monitor BMP

## 2019-06-25 NOTE — QUICK NOTE
Called regarding decr uop  Pt has had 75cc uop over 3h period  Bladder scan only 20cc per nursing  Bmp reviewed w/hyperchloremia sodium at ULN and moderate JENNYFER in the setting of GIB requiring transfusion      No noted edema by day team on admission or today's documentation  Adequate UOP on day shift this am w/o evidence of oliguric state  Hr ALSO in [address] at [address] no repeat since  Pt presently sleeping now per nursing  EKG on admission was NSR  Will give 250cc/hr x 2 hours of 0 45 NS and then increase IVF to 75cc/hr from 50cc/hr 0 45 NS    Will start telemetry x48H  Repeat vs now  If still bradycardiac, will obtain EKG

## 2019-06-26 ENCOUNTER — PATIENT OUTREACH (OUTPATIENT)
Dept: CASE MANAGEMENT | Facility: HOSPITAL | Age: 84
End: 2019-06-26

## 2019-06-26 PROBLEM — Z71.89 GOALS OF CARE, COUNSELING/DISCUSSION: Status: ACTIVE | Noted: 2019-06-26

## 2019-06-26 PROBLEM — R50.9 FEVER: Status: ACTIVE | Noted: 2019-06-26

## 2019-06-26 LAB
ABO GROUP BLD BPU: NORMAL
BASOPHILS # BLD AUTO: 0.07 THOUSANDS/ΜL (ref 0–0.1)
BASOPHILS NFR BLD AUTO: 1 % (ref 0–1)
BPU ID: NORMAL
CROSSMATCH: NORMAL
EOSINOPHIL # BLD AUTO: 0.28 THOUSAND/ΜL (ref 0–0.61)
EOSINOPHIL NFR BLD AUTO: 2 % (ref 0–6)
ERYTHROCYTE [DISTWIDTH] IN BLOOD BY AUTOMATED COUNT: 17 % (ref 11.6–15.1)
HCT VFR BLD AUTO: 23.3 % (ref 36.5–49.3)
HGB BLD-MCNC: 7 G/DL (ref 12–17)
IMM GRANULOCYTES # BLD AUTO: 0.07 THOUSAND/UL (ref 0–0.2)
IMM GRANULOCYTES NFR BLD AUTO: 1 % (ref 0–2)
LACTATE SERPL-SCNC: 1.2 MMOL/L (ref 0.5–2)
LYMPHOCYTES # BLD AUTO: 0.96 THOUSANDS/ΜL (ref 0.6–4.47)
LYMPHOCYTES NFR BLD AUTO: 8 % (ref 14–44)
MCH RBC QN AUTO: 28.9 PG (ref 26.8–34.3)
MCHC RBC AUTO-ENTMCNC: 30 G/DL (ref 31.4–37.4)
MCV RBC AUTO: 96 FL (ref 82–98)
MONOCYTES # BLD AUTO: 1.49 THOUSAND/ΜL (ref 0.17–1.22)
MONOCYTES NFR BLD AUTO: 12 % (ref 4–12)
NEUTROPHILS # BLD AUTO: 9.17 THOUSANDS/ΜL (ref 1.85–7.62)
NEUTS SEG NFR BLD AUTO: 76 % (ref 43–75)
NRBC BLD AUTO-RTO: 0 /100 WBCS
PLATELET # BLD AUTO: 539 THOUSANDS/UL (ref 149–390)
PMV BLD AUTO: 9.8 FL (ref 8.9–12.7)
RBC # BLD AUTO: 2.42 MILLION/UL (ref 3.88–5.62)
UNIT DISPENSE STATUS: NORMAL
UNIT PRODUCT CODE: NORMAL
UNIT RH: NORMAL
WBC # BLD AUTO: 12.04 THOUSAND/UL (ref 4.31–10.16)

## 2019-06-26 PROCEDURE — 99233 SBSQ HOSP IP/OBS HIGH 50: CPT | Performed by: NURSE PRACTITIONER

## 2019-06-26 PROCEDURE — 85025 COMPLETE CBC W/AUTO DIFF WBC: CPT | Performed by: NURSE PRACTITIONER

## 2019-06-26 PROCEDURE — NC001 PR NO CHARGE: Performed by: INTERNAL MEDICINE

## 2019-06-26 PROCEDURE — C9113 INJ PANTOPRAZOLE SODIUM, VIA: HCPCS | Performed by: INTERNAL MEDICINE

## 2019-06-26 PROCEDURE — 83605 ASSAY OF LACTIC ACID: CPT | Performed by: INTERNAL MEDICINE

## 2019-06-26 PROCEDURE — P9016 RBC LEUKOCYTES REDUCED: HCPCS

## 2019-06-26 RX ORDER — ACETAMINOPHEN 325 MG/1
650 TABLET ORAL ONCE
Status: COMPLETED | OUTPATIENT
Start: 2019-06-26 | End: 2019-06-26

## 2019-06-26 RX ADMIN — SODIUM CHLORIDE 75 ML/HR: 4.5 INJECTION, SOLUTION INTRAVENOUS at 01:39

## 2019-06-26 RX ADMIN — SODIUM CHLORIDE 75 ML/HR: 4.5 INJECTION, SOLUTION INTRAVENOUS at 08:24

## 2019-06-26 RX ADMIN — OXYBUTYNIN CHLORIDE 5 MG: 5 TABLET, EXTENDED RELEASE ORAL at 08:40

## 2019-06-26 RX ADMIN — ATORVASTATIN CALCIUM 40 MG: 40 TABLET, FILM COATED ORAL at 17:43

## 2019-06-26 RX ADMIN — PANTOPRAZOLE SODIUM 40 MG: 40 INJECTION, POWDER, FOR SOLUTION INTRAVENOUS at 08:50

## 2019-06-26 RX ADMIN — PANTOPRAZOLE SODIUM 40 MG: 40 INJECTION, POWDER, FOR SOLUTION INTRAVENOUS at 21:51

## 2019-06-26 RX ADMIN — SERTRALINE HYDROCHLORIDE 50 MG: 50 TABLET ORAL at 08:40

## 2019-06-26 RX ADMIN — DOCUSATE SODIUM 100 MG: 100 CAPSULE, LIQUID FILLED ORAL at 08:40

## 2019-06-26 RX ADMIN — Medication 250 MG: at 17:43

## 2019-06-26 RX ADMIN — METOPROLOL TARTRATE 25 MG: 25 TABLET, FILM COATED ORAL at 17:42

## 2019-06-26 RX ADMIN — METOPROLOL TARTRATE 25 MG: 25 TABLET, FILM COATED ORAL at 08:40

## 2019-06-26 RX ADMIN — GABAPENTIN 300 MG: 300 CAPSULE ORAL at 21:59

## 2019-06-26 RX ADMIN — ACETAMINOPHEN 650 MG: 325 TABLET ORAL at 17:43

## 2019-06-26 RX ADMIN — Medication 250 MG: at 08:41

## 2019-06-26 RX ADMIN — PREDNISONE 2.5 MG: 2.5 TABLET ORAL at 08:42

## 2019-06-26 RX ADMIN — POLYETHYLENE GLYCOL 3350 17 G: 17 POWDER, FOR SOLUTION ORAL at 08:40

## 2019-06-26 RX ADMIN — Medication 250 MG: at 08:40

## 2019-06-26 RX ADMIN — FERROUS SULFATE TAB 325 MG (65 MG ELEMENTAL FE) 325 MG: 325 (65 FE) TAB at 08:40

## 2019-06-26 NOTE — ASSESSMENT & PLAN NOTE
· Patient with temperature last evening, temp correlates with blood transfusion, resolved with use of Tylenol  · Blood cultures were obtained which are pending  · Continue to monitor, will premedicate with Tylenol prior to tonight's blood transfusion

## 2019-06-26 NOTE — ASSESSMENT & PLAN NOTE
· Presented with bright red blood per rectum followed by melena as documented in medical record  · He denies nausea vomiting abdominal pain  · Personally observed large tarry black stool today no gross blood noted  · CT of the abdomen and pelvis with mild acute uncomplicated sigmoid diverticulitis, cholelithiasis, renal cyst  · Continue clear liquid diet, will add toast and crackers  · Discussed with GI today that patient continues with blood per rectum and drop in hemoglobin  They had discussion with patient's daughter and patient at the bedside regarding further workup of which they declined at this time  · Lactic acid, 2 1, improved to 1 0  · Change hemoglobin to q 12 hours check, with next check in the morning  · Continue to hold Lovenox and aspirin  · Continue Protonix b i d    · Family OK with PRBC transfusion as needed at this time until they discuss symptom management, and goals of care with palliative Medicine

## 2019-06-26 NOTE — PROGRESS NOTES
Discussion was held with the patient's daughter and wife about further intervention for treatment of GI bleeding, family verbalized understanding about the current situation, given the patient current situation they would like to hold off on any intervention and rather focus on making him comfortable, primary team was present during the discussion, plan is to get palliative care involved and do not perform endoscopic procedures

## 2019-06-26 NOTE — SOCIAL WORK
Pt has [facility] services  Referral in [place]  A post acute care recommendation was made by your care team for [facility]  Discussed Freedom of Choice with caregiver  Choice is to return/continue services

## 2019-06-26 NOTE — PROGRESS NOTES
Progress Note - Sid Alexandra 6/27/1927, 80 y o  male MRN: [id number]    Unit/Bed#: [facility] 213-01 Encounter: [id number]    Primary Care Provider: Brian Scales DO   Date and time admitted to hospital: 6/23/2019 11:40 AM        * GI bleed  Assessment & Plan  · Presented with bright red blood per rectum followed by melena as documented in medical record  · He denies nausea vomiting abdominal pain  · Personally observed large tarry black stool today no gross blood noted  · CT of the abdomen and pelvis with mild acute uncomplicated sigmoid diverticulitis, cholelithiasis, renal cyst  · Continue clear liquid diet, will add toast and crackers  · Discussed with GI today that patient continues with blood per rectum and drop in hemoglobin  They had discussion with patient's daughter and patient at the bedside regarding further workup of which they declined at this time  · Lactic acid, 2 1, improved to 1 0  · Change hemoglobin to q 12 hours check, with next check in the morning  · Continue to hold Lovenox and aspirin  · Continue Protonix b i d  · Family OK with PRBC transfusion as needed at this time until they discuss symptom management, and goals of care with palliative Medicine    Goals of care, counseling/discussion  Assessment & Plan  · Discussion with patient's daughter and wife at bedside in the presence of patient regarding goals of care and expectations on the patient and family's part regarding continuing to pursue treatment for his underlying suspected diverticular bleed  They do not want aggressive measures as they do not want him uncomfortable they clearly expressed that they want him to be comfortable and not in any distress and his daughter is very adamant about him not undergoing a bowel prep to be able to undergo colonoscopy  They are aware that if we do not correct what is causing his bleed that he will continue to most likely bleed    Recommend a palliative care consult to discuss symptom management and goals of care possibly consider a POLST  Daughter is power of  and would like to discuss with the palliative palliative team about goals of care and symptom management  · Consult placed to palliative medicine    Fever  Assessment & Plan  · Patient with temperature last evening, temp correlates with blood transfusion, resolved with use of Tylenol  · Blood cultures were obtained which are pending  · Continue to monitor, will premedicate with Tylenol prior to tonight's blood transfusion    Urinary retention  Assessment & Plan  · Patient asymptomatic  · Continue to follow urinary retention protocol  · Monitor I+O  · If patient requires Sepulveda recommend urology consultation  · Continued ditropan    JENNYFER on CKD stage 3  Assessment & Plan  · Present on admission with creatinine noted to be 1 90 with baseline creatinine noted to be between 1 2-1 4  · Creatinine improved currently 1 48  · Continue IV hydration, encourage oral intake   · Avoid nephrotoxic agents and hypotension  · Monitor BMP    Rheumatoid arthritis (HCC)  Assessment & Plan  · Continue Prednisone 2 5 mg daily    Dementia  Assessment & Plan  · Supportive care, continue with Zoloft daily  · PT/OT recommending STR on discharge, daughter with plans to take patient home    Hypertension  Assessment & Plan  · Blood pressure acceptable  · Continue Lopressor with holding parameters   · Monitor      VTE Pharmacologic Prophylaxis:   Pharmacologic: Pharmacologic VTE Prophylaxis contraindicated due to GIB, anemia  Mechanical VTE Prophylaxis in Place: Yes    Patient Centered Rounds: I have performed bedside rounds with nursing staff today  Discussions with Specialists or Other Care Team Provider: GI    Education and Discussions with Family / Patient: patient, patients daughter and wife at bedside    Time Spent for Care: 30 minutes  More than 50% of total time spent on counseling and coordination of care as described above      Current Length of Stay: 2 day(s)    Current Patient Status: Inpatient   Certification Statement: The patient will continue to require additional inpatient hospital stay due to need for PRBC infusion and palliative consultation, not medically stable    Discharge Plan: home with [address]    Code Status: Level 3 - DNAR and DNI      Subjective:   Reports feeling weak and tired, denies pain  No HA or dizziness  No N/V  amanda small amts of clears  Had one large tarry like stool, no BRBPR    Objective:     Vitals:   Temp (24hrs), Av 9 °F (37 7 °C), Min:97 5 °F (36 4 °C), Max:101 8 °F (38 8 °C)    Temp:  [97 5 °F (36 4 °C)-101 8 °F (38 8 °C)] 98 2 °F (36 8 °C)  HR:  [64-99] 69  Resp:  [16-22] 18  BP: (123-167)/(53-75) 127/56  SpO2:  [99 %-100 %] 100 %  Body mass index is 22 4 kg/m²  Input and Output Summary (last 24 hours): Intake/Output Summary (Last 24 hours) at 2019 1710  Last data filed at 2019 1430  Gross per 24 hour   Intake 1630 67 ml   Output 1404 ml   Net 226 67 ml       Physical Exam:     Physical Exam   Constitutional: He is oriented to person, place, and time  He appears well-developed  He is cooperative  No distress  Weak/chronically ill appearing   HENT:   Head: Normocephalic and atraumatic  Neck: Neck supple  Cardiovascular: Bradycardia present  Exam reveals distant heart sounds  Pulmonary/Chest: Effort normal and breath sounds normal  No respiratory distress  Abdominal: Soft  Bowel sounds are normal  He exhibits no distension  There is no tenderness  Musculoskeletal: He exhibits no edema  Neurological: He is alert and oriented to person, place, and time  Skin: Skin is warm and dry  There is pallor  Psychiatric: He has a normal mood and affect  His behavior is normal    Vitals reviewed        Additional Data:     Labs:    Results from last 7 days   Lab Units 19  0627   WBC Thousand/uL 12 04*   HEMOGLOBIN g/dL 7 0*   HEMATOCRIT % 23 3*   PLATELETS Thousands/uL 539*   NEUTROS PCT % 76*   LYMPHS PCT % 8*   MONOS PCT % 12   EOS PCT % 2     Results from last 7 days   Lab Units 06/25/19  0457  06/23/19  1157   POTASSIUM mmol/L 4 2   < >  --    CHLORIDE mmol/L 112*   < >  --    CO2 mmol/L 22   < >  --    CO2, I-STAT mmol/L  --   --  22   BUN mg/dL 41*   < >  --    CREATININE mg/dL 1 48*   < >  --    CALCIUM mg/dL 8 2*   < >  --    ALK PHOS U/L 72   < >  --    ALT U/L 16   < >  --    AST U/L 12   < >  --    GLUCOSE, ISTAT mg/dl  --   --  157*    < > = values in this interval not displayed  * I Have Reviewed All Lab Data Listed Above  * Additional Pertinent Lab Tests Reviewed:  All Labs Within Last 24 Hours Reviewed    Imaging:    Imaging Reports Reviewed Today Include: none  Imaging Personally Reviewed by Myself Includes:  none    Recent Cultures (last 7 days):           Last 24 Hours Medication List:     Current Facility-Administered Medications:  acetaminophen 650 mg Oral Q6H PRN Landen Reis MD    acetaminophen 650 mg Oral Once [facility] MAZIN Veronica    vitamin C 250 mg Oral Daily Landen Reis MD    atorvastatin 40 mg Oral QPM Landen Reis MD    docusate sodium 100 mg Oral BID Landen Reis MD    ferrous sulfate 325 mg Oral Daily With Breakfast Landen Reis MD    gabapentin 300 mg Oral HS Landen Reis MD    metoprolol tartrate 25 mg Oral BID Landen Reis MD    oxybutynin 5 mg Oral Daily Orestes Simms MD    pantoprazole 40 mg Intravenous Q12H [facility] Landen Reis MD    polyethylene glycol 17 g Oral Daily Landen Reis MD    predniSONE 2 5 mg Oral Daily Landen Reis, MD    saccharomyces boulardii 250 mg Oral BID Landen Reis MD    senna 1 tablet Oral HS Landen Reis MD    sertraline 50 mg Oral Daily Landen Reis, MD    sodium chloride 75 mL/hr Intravenous Continuous Phyllis Gonzalez PA-C Last Rate: 75 mL/hr (06/26/19 0824)        Today, Patient Was Seen By: [facility] MAZIN Veronica    ** Please Note: Dictation voice to text software may have been used in the creation of this document   **

## 2019-06-26 NOTE — ASSESSMENT & PLAN NOTE
· Present on admission with creatinine noted to be 1 90 with baseline creatinine noted to be between 1 2-1 4    · Creatinine improved currently 1 48  · Continue IV hydration, encourage oral intake   · Avoid nephrotoxic agents and hypotension  · Monitor BMP

## 2019-06-26 NOTE — ASSESSMENT & PLAN NOTE
· Supportive care, continue with Zoloft daily  · PT/OT recommending STR on discharge, daughter with plans to take patient home

## 2019-06-27 ENCOUNTER — APPOINTMENT (INPATIENT)
Dept: RADIOLOGY | Facility: HOSPITAL | Age: 84
DRG: 813 | End: 2019-06-27
Payer: MEDICARE

## 2019-06-27 ENCOUNTER — TELEPHONE (OUTPATIENT)
Dept: FAMILY MEDICINE CLINIC | Facility: CLINIC | Age: 84
End: 2019-06-27

## 2019-06-27 PROBLEM — D62 ACUTE POST-HEMORRHAGIC ANEMIA: Status: ACTIVE | Noted: 2019-06-27

## 2019-06-27 LAB
ABO GROUP BLD BPU: NORMAL
ANION GAP SERPL CALCULATED.3IONS-SCNC: 7 MMOL/L (ref 4–13)
BACTERIA UR QL AUTO: ABNORMAL /HPF
BASOPHILS # BLD AUTO: 0.11 THOUSANDS/ΜL (ref 0–0.1)
BASOPHILS NFR BLD AUTO: 1 % (ref 0–1)
BILIRUB UR QL STRIP: NEGATIVE
BPU ID: NORMAL
BUN SERPL-MCNC: 20 MG/DL (ref 5–25)
CALCIUM SERPL-MCNC: 8.7 MG/DL (ref 8.3–10.1)
CHLORIDE SERPL-SCNC: 108 MMOL/L (ref 100–108)
CLARITY UR: ABNORMAL
CO2 SERPL-SCNC: 24 MMOL/L (ref 21–32)
COLOR UR: YELLOW
CREAT SERPL-MCNC: 1.1 MG/DL (ref 0.6–1.3)
CROSSMATCH: NORMAL
EOSINOPHIL # BLD AUTO: 0.4 THOUSAND/ΜL (ref 0–0.61)
EOSINOPHIL NFR BLD AUTO: 3 % (ref 0–6)
ERYTHROCYTE [DISTWIDTH] IN BLOOD BY AUTOMATED COUNT: 17 % (ref 11.6–15.1)
GFR SERPL CREATININE-BSD FRML MDRD: 58 ML/MIN/1.73SQ M
GLUCOSE SERPL-MCNC: 124 MG/DL (ref 65–140)
GLUCOSE SERPL-MCNC: 98 MG/DL (ref 65–140)
GLUCOSE UR STRIP-MCNC: NEGATIVE MG/DL
HCT VFR BLD AUTO: 30 % (ref 36.5–49.3)
HGB BLD-MCNC: 9.4 G/DL (ref 12–17)
HGB UR QL STRIP.AUTO: ABNORMAL
HYALINE CASTS #/AREA URNS LPF: ABNORMAL /LPF
IMM GRANULOCYTES # BLD AUTO: 0.07 THOUSAND/UL (ref 0–0.2)
IMM GRANULOCYTES NFR BLD AUTO: 1 % (ref 0–2)
KETONES UR STRIP-MCNC: NEGATIVE MG/DL
LACTATE SERPL-SCNC: 1.1 MMOL/L (ref 0.5–2)
LEUKOCYTE ESTERASE UR QL STRIP: ABNORMAL
LYMPHOCYTES # BLD AUTO: 0.75 THOUSANDS/ΜL (ref 0.6–4.47)
LYMPHOCYTES NFR BLD AUTO: 6 % (ref 14–44)
MCH RBC QN AUTO: 28.2 PG (ref 26.8–34.3)
MCHC RBC AUTO-ENTMCNC: 31.3 G/DL (ref 31.4–37.4)
MCV RBC AUTO: 90 FL (ref 82–98)
MONOCYTES # BLD AUTO: 1.56 THOUSAND/ΜL (ref 0.17–1.22)
MONOCYTES NFR BLD AUTO: 12 % (ref 4–12)
NEUTROPHILS # BLD AUTO: 10.57 THOUSANDS/ΜL (ref 1.85–7.62)
NEUTS SEG NFR BLD AUTO: 77 % (ref 43–75)
NITRITE UR QL STRIP: NEGATIVE
NON-SQ EPI CELLS URNS QL MICRO: ABNORMAL /HPF
NRBC BLD AUTO-RTO: 0 /100 WBCS
PH UR STRIP.AUTO: 5.5 [PH]
PLATELET # BLD AUTO: 628 THOUSANDS/UL (ref 149–390)
PMV BLD AUTO: 9.7 FL (ref 8.9–12.7)
POTASSIUM SERPL-SCNC: 3.5 MMOL/L (ref 3.5–5.3)
PROT UR STRIP-MCNC: ABNORMAL MG/DL
RBC # BLD AUTO: 3.33 MILLION/UL (ref 3.88–5.62)
RBC #/AREA URNS AUTO: ABNORMAL /HPF
SODIUM SERPL-SCNC: 139 MMOL/L (ref 136–145)
SP GR UR STRIP.AUTO: 1.01 (ref 1–1.03)
UNIT DISPENSE STATUS: NORMAL
UNIT PRODUCT CODE: NORMAL
UNIT RH: NORMAL
UROBILINOGEN UR QL STRIP.AUTO: 1 E.U./DL
WBC # BLD AUTO: 13.46 THOUSAND/UL (ref 4.31–10.16)
WBC #/AREA URNS AUTO: ABNORMAL /HPF

## 2019-06-27 PROCEDURE — 99222 1ST HOSP IP/OBS MODERATE 55: CPT | Performed by: PHYSICIAN ASSISTANT

## 2019-06-27 PROCEDURE — 80048 BASIC METABOLIC PNL TOTAL CA: CPT | Performed by: NURSE PRACTITIONER

## 2019-06-27 PROCEDURE — 71045 X-RAY EXAM CHEST 1 VIEW: CPT

## 2019-06-27 PROCEDURE — 85025 COMPLETE CBC W/AUTO DIFF WBC: CPT | Performed by: NURSE PRACTITIONER

## 2019-06-27 PROCEDURE — 87086 URINE CULTURE/COLONY COUNT: CPT | Performed by: NURSE PRACTITIONER

## 2019-06-27 PROCEDURE — 81001 URINALYSIS AUTO W/SCOPE: CPT | Performed by: NURSE PRACTITIONER

## 2019-06-27 PROCEDURE — 0T9B70Z DRAINAGE OF BLADDER WITH DRAINAGE DEVICE, VIA NATURAL OR ARTIFICIAL OPENING: ICD-10-PCS | Performed by: UROLOGY

## 2019-06-27 PROCEDURE — 99222 1ST HOSP IP/OBS MODERATE 55: CPT | Performed by: NURSE PRACTITIONER

## 2019-06-27 PROCEDURE — 87186 SC STD MICRODIL/AGAR DIL: CPT | Performed by: NURSE PRACTITIONER

## 2019-06-27 PROCEDURE — 99232 SBSQ HOSP IP/OBS MODERATE 35: CPT | Performed by: NURSE PRACTITIONER

## 2019-06-27 PROCEDURE — 82948 REAGENT STRIP/BLOOD GLUCOSE: CPT

## 2019-06-27 PROCEDURE — 87077 CULTURE AEROBIC IDENTIFY: CPT | Performed by: NURSE PRACTITIONER

## 2019-06-27 PROCEDURE — C9113 INJ PANTOPRAZOLE SODIUM, VIA: HCPCS | Performed by: INTERNAL MEDICINE

## 2019-06-27 PROCEDURE — 83605 ASSAY OF LACTIC ACID: CPT | Performed by: NURSE PRACTITIONER

## 2019-06-27 RX ORDER — POLYETHYLENE GLYCOL 3350 17 G/17G
17 POWDER, FOR SOLUTION ORAL DAILY PRN
Status: DISCONTINUED | OUTPATIENT
Start: 2019-06-27 | End: 2019-07-02 | Stop reason: HOSPADM

## 2019-06-27 RX ADMIN — SODIUM CHLORIDE 75 ML/HR: 4.5 INJECTION, SOLUTION INTRAVENOUS at 06:25

## 2019-06-27 RX ADMIN — Medication 250 MG: at 10:53

## 2019-06-27 RX ADMIN — ATORVASTATIN CALCIUM 40 MG: 40 TABLET, FILM COATED ORAL at 17:30

## 2019-06-27 RX ADMIN — SODIUM CHLORIDE 75 ML/HR: 4.5 INJECTION, SOLUTION INTRAVENOUS at 20:24

## 2019-06-27 RX ADMIN — FERROUS SULFATE TAB 325 MG (65 MG ELEMENTAL FE) 325 MG: 325 (65 FE) TAB at 06:39

## 2019-06-27 RX ADMIN — METOPROLOL TARTRATE 25 MG: 25 TABLET, FILM COATED ORAL at 10:53

## 2019-06-27 RX ADMIN — PREDNISONE 2.5 MG: 2.5 TABLET ORAL at 10:54

## 2019-06-27 RX ADMIN — GABAPENTIN 300 MG: 300 CAPSULE ORAL at 22:47

## 2019-06-27 RX ADMIN — PANTOPRAZOLE SODIUM 40 MG: 40 INJECTION, POWDER, FOR SOLUTION INTRAVENOUS at 10:54

## 2019-06-27 RX ADMIN — Medication 250 MG: at 10:54

## 2019-06-27 RX ADMIN — PANTOPRAZOLE SODIUM 40 MG: 40 INJECTION, POWDER, FOR SOLUTION INTRAVENOUS at 22:47

## 2019-06-27 RX ADMIN — ACETAMINOPHEN 650 MG: 325 TABLET ORAL at 16:09

## 2019-06-27 RX ADMIN — Medication 250 MG: at 17:30

## 2019-06-27 RX ADMIN — METOPROLOL TARTRATE 25 MG: 25 TABLET, FILM COATED ORAL at 17:30

## 2019-06-27 RX ADMIN — OXYBUTYNIN CHLORIDE 5 MG: 5 TABLET, EXTENDED RELEASE ORAL at 10:53

## 2019-06-27 RX ADMIN — SERTRALINE HYDROCHLORIDE 50 MG: 50 TABLET ORAL at 10:53

## 2019-06-27 RX ADMIN — DOCUSATE SODIUM 100 MG: 100 CAPSULE, LIQUID FILLED ORAL at 10:53

## 2019-06-27 RX ADMIN — ACETAMINOPHEN 650 MG: 325 TABLET ORAL at 06:38

## 2019-06-27 NOTE — ASSESSMENT & PLAN NOTE
· Presented with bright red blood per rectum followed by melena as documented in medical record  · He denies nausea vomiting abdominal pain  · Personally observed large tarry black stool yesterday  · No further stools noted since yesterday no blood per rectum  · CT of the abdomen and pelvis with mild acute uncomplicated sigmoid diverticulitis, cholelithiasis, renal cyst  · Will discuss with GI about advancing patient's diet  · Discussed with GI yesterday about black tarry stool in concerns for continue blood loss  Patient's daughter reaffirms that she does not want him to go through any aggressive workup  Did have discussion with her about goals of care, palliative medicine met with her and the patient today of which they are going to consider possible home with hospice  · Lactic acid, 2 1, improved to 1 0  · Hemoglobin 9 4 today post unit of blood yesterday, no blood per rectum noted, will discuss with GI if diet can be advanced  · Continue to hold Lovenox and aspirin  · Continue Protonix b i d    · Family OK with PRBC transfusion as needed at this time until they discuss symptom management, and goals of care with palliative Medicine

## 2019-06-27 NOTE — ASSESSMENT & PLAN NOTE
· In the setting of GI bleed as evidenced by bright red blood per rectum and hemoglobin of 6 4 on admission    · It is suspected this is a diverticular bleed and patient was on Lovenox status post hip repair  · Received 3 units of packed RBCs this admission  · Today's hemoglobin 9 4, stable  · Per nursing no blood per rectum noted last stool yesterday black and tarry  · Continue to monitor

## 2019-06-27 NOTE — ASSESSMENT & PLAN NOTE
· Supportive care, continue with Zoloft daily  · PT/OT recommending STR on discharge, daughter with plans to take patient home, no plans for short-term rehab patient may likely go home with hospice family to decide within the next 24 hours

## 2019-06-27 NOTE — CONSULTS
Consultation - Palliative and Supportive Care   Lesly Luis 80 y o  male [id number]    Assessment:  GI bleed   JENNYFER on CKD3  Urinary retention  Dementia  RA  Fever  Weakness  Fatigue  Goals of care counseling    Plan:  Symptom management:  No palliative symptom management today  Will continue to monitor patient and treat accordingly  Goals:  Level 3 code status  · Bedside family meeting scheduled for tomorrow at 12:00 to review Hospice Services with patients wife and children  · Daughter Madeleine Britt is going to review information provided today with family prior to the meeting tomorrow  · Madeleine Britt seems interested in home hospice however would like to decide with the assistance of her family tomorrow  IDENTIFICATION:  Inpatient consult to Palliative Care  Consult performed by: MAZIN Tobar  Consult ordered by: Fausto Pinto, [address] [place] [address]      Physician Requesting Consult: Keila Escobar MD  Reason for Consult / Principal Problem: GOC counseling  Hx and PE limited by: Dementia, history provided by leticia Britt    History of Present Illness:  Lesly Luis is a 80 y o  male who presented to the ED on 6/23/19 after his family called EMS due to 2 bowel movements that "were mostly blood "  Patient had just been discharged [facility] the day before where he was undergoing rehabilitation secondary to a right hip fracture in May  He denied any other symptoms where he arrived in the ED, VS were stable and Hg was stable  Unfortunately as the bleeding continued his Hg dropped and he has had 3 units of blood since arrival   Today his Hg is 9 4  Family has decided against any aggressive care and are considering hospice  Today patient appears comfortable and in no distress  Says he doesn't feel good but can not give any specifics  Is cooperative and pleasent    Spent approximately 35 minutes with patients leticia Britt  Provided thorough explanation of Palliative Medicine and Hospice Services to her    She had many questions related to both services and feels that the decision to sign on to hospice will be best made with the assistance of her siblings  Her mother will be included in the discussion however has had a stroke and this has made her "thinking a little slower "  Gualberto Edgar will review options tonight in an effort to allow her mother time to absorb information and help to make an informed decision  Gualberto Edgar does state that she know her father would not want to have to keep going in and out of hospital   She realizes the toll this takes on a patient with dementia and she does not want that for her father  Gualberto Edgar would be available to help take of her father and they recently hired private aides to come into the home and assist in the care of her parents  Currently, the aides are in the home 2 hours per day three days per week but Gualberto Edgar is considering increasing this services to daily  Also discussed coverage of hospice  Patient has a walker, wheelchair and commode at home but would need a hospital bed  Explained coverage for home hospice and DME supplies that hospice could provide      ROS  Unable to obtain secondary to dementia    Past Medical History:   Diagnosis Date    Abnormal blood chemistry     resolved: 5/13/2017    Accidental puncture during procedure on organ 04/27/2011    accidental puncture/laceration during a procedure    Adenocarcinoma of prostate ([facility] [address] ) 11/02/2004    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    rheumatoid arthritis    Bilateral edema of lower extremity     last assessed-7/1/2015    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    Coronary artery disease     Dementia     Gait dyspraxia     last assessed-1/26/2017    Gastrointestinal hemorrhage     last assessed-2/15/2017    Hematochezia     resolved: 2/15/2017    Herpes zoster 12/07/2011    History of degenerative disc disease     resolved: 5/13/2017    History of methicillin resistant staphylococcus aureus (MRSA)     nasal swab negative 5/15/2019-Contact isolation discontinued per protocol    Hyperlipidemia     Hypertension     Incontinence of feces     last assessed-11/17/2016    Inflamed seborrheic keratosis 11/30/2006    Neoplasm of bone 11/20/2007    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)     Paronychia of toe     last assessed-8/6/2013-unspecified laterality    Rectal bleeding     resolved-7/30/2017    Rheumatoid arthritis involving both hands ([facility] [address] )     Sigmoid diverticulitis     last assessed-1/26/2017    Squamous cell carcinoma of cheek     resolved-7/30/2017    Tinea corporis 06/04/2008    Transient disorder of initiating or maintaining sleep 05/21/2010    Vertigo, benign paroxysmal, unspecified laterality 07/09/2012    Xerosis cutis     last assessed-8/6/2013     Past Surgical History:   Procedure Laterality Date    COLONOSCOPY N/A 1/19/2017    Procedure: COLONOSCOPY;  Surgeon: Elizabeth Bertrand MD;  Location: [facility] GI LAB; Service:     COLONOSCOPY N/A 1/18/2017    Procedure: COLONOSCOPY;  Surgeon: Elizabeth Bertrand MD;  Location: [facility] GI LAB; Service:     COLONOSCOPY N/A 6/26/2017    Procedure: COLONOSCOPY;  Surgeon: Crystal Lu MD;  Location: [facility] GI LAB;   Service: Gastroenterology    CORONARY ARTERY BYPASS GRAFT      GA OPEN RX FEMUR FX+INTRAMED DELON Right 5/28/2019    Procedure: INSERTION NAIL IM FEMUR ANTEGRADE (TROCHANTERIC), RIGHT;  Surgeon: Hardeep Sun MD;  Location:  MAIN OR;  Service: Orthopedics     Social History     Socioeconomic History    Marital status: /Civil Union     Spouse name: Not on file    Number of children: Not on file    Years of education: Not on file    Highest education level: Not on file   Occupational History    Not on file   Social Needs    Financial resource strain: Not on file    Food insecurity:     Worry: Not on file     Inability: Not on file    Transportation needs:     Medical: Not on file     Non-medical: Not on file   Tobacco Use    Smoking status: Former Smoker     Packs/day: 1 00     Years: 30 00     Pack years: 30 00     Types: Cigarettes     Last attempt to quit: 1955     Years since quittin 4    Smokeless tobacco: Never Used   Substance and Sexual Activity    Alcohol use: Not Currently    Drug use: Not Currently    Sexual activity: Not on file   Lifestyle    Physical activity:     Days per week: Not on file     Minutes per session: Not on file    Stress: Not on file   Relationships    Social connections:     Talks on phone: Not on file     Gets together: Not on file     Attends Alevism service: Not on file     Active member of club or organization: Not on file     Attends meetings of clubs or organizations: Not on file     Relationship status: Not on file    Intimate partner violence:     Fear of current or ex partner: Not on file     Emotionally abused: Not on file     Physically abused: Not on file     Forced sexual activity: Not on file   Other Topics Concern    Not on file   Social History Narrative    Not on file     Family History   Problem Relation Age of Onset    Stroke Mother     Hypertension Father     Stroke Father        Medications:  all current active meds have been reviewed and current meds:   Current Facility-Administered Medications   Medication Dose Route Frequency    acetaminophen (TYLENOL) tablet 650 mg  650 mg Oral Q6H PRN    ascorbic acid (VITAMIN C) tablet 250 mg  250 mg Oral Daily    atorvastatin (LIPITOR) tablet 40 mg  40 mg Oral QPM    docusate sodium (COLACE) capsule 100 mg  100 mg Oral BID    ferrous sulfate tablet 325 mg  325 mg Oral Daily With Breakfast    gabapentin (NEURONTIN) capsule 300 mg  300 mg Oral HS    metoprolol tartrate (LOPRESSOR) tablet 25 mg  25 mg Oral BID    oxybutynin (DITROPAN-XL) 24 hr tablet 5 mg  5 mg Oral Daily    pantoprazole (PROTONIX) injection 40 mg  40 mg Intravenous Q12H [facility]    polyethylene glycol (MIRALAX) packet 17 g  17 g Oral Daily    predniSONE tablet 2 5 mg  2 5 mg Oral Daily    saccharomyces boulardii (FLORASTOR) capsule 250 mg  250 mg Oral BID    sertraline (ZOLOFT) tablet 50 mg  50 mg Oral Daily    sodium chloride infusion 0 45 %  75 mL/hr Intravenous Continuous       No Known Allergies    Objective:  /67   Pulse 94   Temp 98 6 °F (37 °C)   Resp 18   Ht 5' 8" (1 727 m)   Wt 66 kg (145 lb 7 oz)   SpO2 99%   BMI 22 11 kg/m²   Physical Exam:  Physical Exam  Constitutional: Appears ill and weak  In no acute distress  Head: Normocephalic and atraumatic  Eyes: EOM are normal  No ocular discharge  No scleral icterus  Neck: no visible adenopathy or masses  Respiratory: Effort normal  No stridor  No respiratory distress  Gastrointestinal: No abdominal distension  Musculoskeletal: No edema  Neurological: Alert and appropriately conversant  Skin: Dry, no diaphoresis  Pallor  Psychiatric: Displays a normal mood and affect  Very pleasant    Lab Results:   I have personally reviewed pertinent labs  , CBC:   Lab Results   Component Value Date    WBC 13 46 (H) 06/27/2019    HGB 9 4 (L) 06/27/2019    HCT 30 0 (L) 06/27/2019    MCV 90 06/27/2019     (H) 06/27/2019    MCH 28 2 06/27/2019    MCHC 31 3 (L) 06/27/2019    RDW 17 0 (H) 06/27/2019    MPV 9 7 06/27/2019    NRBC 0 06/27/2019   , CMP:   Lab Results   Component Value Date    SODIUM 139 06/27/2019    K 3 5 06/27/2019     06/27/2019    CO2 24 06/27/2019    BUN 20 06/27/2019    CREATININE 1 10 06/27/2019    CALCIUM 8 7 06/27/2019    EGFR 58 06/27/2019         Counseling / Coordination of Care  Total floor / unit time spent today 50 minutes  Greater than 50% of total time was spent with the patient and / or family counseling and / or coordination of care  A description of the counseling / coordination of care:  Reviewed expected disease trajectory, code status, and hospice services    Spent time supportive listening regarding frustrations over difficult decisions      Jesenia Phillips, [address] [place] [address]

## 2019-06-27 NOTE — NURSING NOTE
Unable to get stat blood work  Able to get lactic, resulted as 1 1  Temp at 99 after tylenol    Notified Shalonda RETANA with [facility] and ok to get labs in the am

## 2019-06-27 NOTE — NURSING NOTE
Blood transfusion completed at 2220  No adverse effects noted  Pt calm and lying in bed  1/2NSS at 75 restarted

## 2019-06-27 NOTE — CONSULTS
Consult - Urology   Crystal Looney 6/27/1927, 80 y o  male MRN: [id number]    Unit/Bed#: 2 213-01 Encounter: [id number]    Acute urinary retention  Secondary to dementia, immobility, recent hip fracture, deconditioning and general   Recommend maintaining Sepulveda catheter to gravity drainage  Would recommend void trial no sooner than 10-14 days, with maintaining Sepulveda catheter as patient has tolerated that well, if family chooses to go on hospice  Urology will sign off but remain available for any further inpatient needs  Please feel free to call with questions or concerns  Subjective/Objective     Subjective:   History is obtained from chart review  Patient has dementia and is unable to provide the history  80-year-old male is currently admitted with GI bleed, causing significant acute blood loss anemia requiring 3 units of packed red blood cells to be transfused  Family is currently undergoing discussion and determining level of care and considering home hospice  During his hospitalization, he was found to have retention with bladder scan over 500 and straight catheterization for 600 cc  Secondary to this, Sepulveda catheter was placed  Urology is asked to see him relative to urinary retention  HPI  Review of Systems   Unable to perform ROS: Dementia       Objective:  Vitals: Blood pressure 164/67, pulse 94, temperature 98 6 °F (37 °C), resp  rate 18, height 5' 8" (1 727 m), weight 66 kg (145 lb 7 oz), SpO2 99 %  ,Body mass index is 22 11 kg/m²  Intake/Output Summary (Last 24 hours) at 6/27/2019 1516  Last data filed at 6/27/2019 1100  Gross per 24 hour   Intake 1016 25 ml   Output 1242 ml   Net -225 75 ml       Invasive Devices     Peripheral Intravenous Line            Peripheral IV 06/27/19 Right Arm less than 1 day          Drain            Urethral Catheter Non-latex 18 Fr  less than 1 day                Physical Exam   Constitutional: He is cooperative  He does not appear ill   No distress  59-year-old male who appears stated age  No acute distress, drowsy  Arouses minimally to his name  HENT:   Head: Normocephalic and atraumatic  Moist mucous membranes  Eyes: Conjunctivae and EOM are normal    Neck: Normal range of motion  Neck supple  No tracheal deviation present  Cardiovascular: Normal rate and normal heart sounds  No murmur heard  Irregularly irregular   Pulmonary/Chest: Effort normal and breath sounds normal  No respiratory distress  He has no wheezes  Good airflow bilaterally on deep inspiration  Abdominal: Soft  Bowel sounds are normal  He exhibits no distension and no mass  There is no tenderness  Abdomen soft without significant suprapubic fullness  Genitourinary:   Genitourinary Comments: Sepulveda catheter draining slightly turbid yellow urine  Skin: No rash noted  He is not diaphoretic  No erythema  No pallor  Nursing note and vitals reviewed        History:  Past Medical History:   Diagnosis Date    Abnormal blood chemistry     resolved: 5/13/2017    Accidental puncture during procedure on organ 04/27/2011    accidental puncture/laceration during a procedure    Adenocarcinoma of prostate ([facility] [address] ) 11/02/2004    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    rheumatoid arthritis    Bilateral edema of lower extremity     last assessed-7/1/2015    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    Coronary artery disease     Dementia     Gait dyspraxia     last assessed-1/26/2017    Gastrointestinal hemorrhage     last assessed-2/15/2017    Hematochezia     resolved: 2/15/2017    Herpes zoster 12/07/2011    History of degenerative disc disease     resolved: 5/13/2017    History of methicillin resistant staphylococcus aureus (MRSA)     nasal swab negative 5/15/2019-Contact isolation discontinued per protocol    Hyperlipidemia     Hypertension     Incontinence of feces     last assessed-11/17/2016    Inflamed seborrheic keratosis 11/30/2006    Neoplasm of bone 11/20/2007    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    Osteoarthritis     resolved: 10/1/2017    [facility]peripheral artery [facility])     Paronychia of toe     last assessed-8/6/2013-unspecified laterality    Rectal bleeding     resolved-7/30/2017    Rheumatoid arthritis involving both hands ([facility] [address] )     Sigmoid diverticulitis     last assessed-1/26/2017    Squamous cell carcinoma of cheek     resolved-7/30/2017    Tinea corporis 06/04/2008    Transient disorder of initiating or maintaining sleep 05/21/2010    Vertigo, benign paroxysmal, unspecified laterality 07/09/2012    Xerosis cutis     last assessed-8/6/2013     Past Surgical History:   Procedure Laterality Date    COLONOSCOPY N/A 1/19/2017    Procedure: COLONOSCOPY;  Surgeon: Felicia Valdes MD;  Location: [place] [address] GI LAB; Service:     COLONOSCOPY N/A 1/18/2017    Procedure: COLONOSCOPY;  Surgeon: Felicia Valdes MD;  Location: [place] [address] [facility] LAB; Service:     COLONOSCOPY N/A 6/26/2017    Procedure: COLONOSCOPY;  Surgeon: Darling Price MD;  Location: [place] [address] [facility] LAB;   Service: Gastroenterology    CORONARY ARTERY BYPASS GRAFT      MN OPEN RX FEMUR FX+INTRAMED DELON Right 5/28/2019    Procedure: INSERTION NAIL IM FEMUR ANTEGRADE (TROCHANTERIC), RIGHT;  Surgeon: Bernadette Dunlap MD;  [facility];  Service: Orthopedics     Family History   Problem Relation Age of Onset    Stroke Mother     Hypertension Father     Stroke Father      Social History     Socioeconomic History    Marital status: /Civil Union     Spouse name: Not on file    Number of children: Not on file    Years of education: Not on file    Highest education level: Not on file   Occupational History    Not on file   Social Needs    Financial resource strain: Not on file    Food insecurity:     Worry: Not on file     Inability: Not on file    Transportation needs:     Medical: Not on file     Non-medical: Not on file   Tobacco Use    Smoking status: Former Smoker     Packs/day: 1 00     Years: 30 00     Pack years: 30 00     Types: Cigarettes     Last attempt to quit: 1955     Years since quittin 4    Smokeless tobacco: Never Used   Substance and Sexual Activity    Alcohol use: Not Currently    Drug use: Not Currently    Sexual activity: Not on file   Lifestyle    Physical activity:     Days per week: Not on file     Minutes per session: Not on file    Stress: Not on file   Relationships    Social connections:     Talks on phone: Not on file     Gets together: Not on file     Attends Confucianism service: Not on file     Active member of club or organization: Not on file     Attends meetings of clubs or organizations: Not on file     Relationship status: Not on file    Intimate partner violence:     Fear of current or ex partner: Not on file     Emotionally abused: Not on file     Physically abused: Not on file     Forced sexual activity: Not on file   Other Topics Concern    Not on file   Social History Narrative    Not on file       Imaging:  CT from 2019 remarkable for renal cyst only  Bladder appeared normal   Evidence of brachytherapy seeds in the prostate gland  Imaging reviewed - both report and images personally reviewed       Labs:  Recent Labs     19  0457 19  1833 19  0627 19  0715   WBC 11 32* 7 94 12 04* 13 46*     Recent Labs     19  2119 19  0457 19  0621 19  1452 19  1832 19  1833 19  2046 19  0627 19  0715   HGB 7 7* 6 9* 7 0* 6 7* 13 6 13 8 7 2* 7 0* 9 4*     Recent Labs     19  0457 19  0714   CREATININE 1 48* 1 10       Galina Espino PA-C  Date: 2019 Time: 3:16 PM

## 2019-06-27 NOTE — QUICK NOTE
Message from nursing patient spiked temperature 102 3, Sepulveda noted to have milky yellow urine after catheter was placed earlier this morning  Patient more lethargic today arousable but not his typical state of alertness, appetite is decreased today  Will check UA culture, CBC CMP procalcitonin lactic acid and portable chest x-ray    Had blood cultures completed 24 hours ago which were negative thus far

## 2019-06-27 NOTE — ASSESSMENT & PLAN NOTE
· Present on admission with creatinine noted to be 1 90 with baseline creatinine noted to be between 1 2-1 4    · Creatinine improved currently 1 10  · Continue IV hydration, encourage oral intake   · Avoid nephrotoxic agents and hypotension  · Monitor BMP

## 2019-06-27 NOTE — ASSESSMENT & PLAN NOTE
Discussion with patient's daughter and wife at bedside on June 26, 2019 in the presence of patient regarding goals of care and expectations on the patient and family's part regarding continuing to pursue treatment for his underlying suspected diverticular bleed  They do not want aggressive measures as they do not want him uncomfortable they clearly expressed that they want him to be comfortable and not in any distress and his daughter is very adamant about him not undergoing a bowel prep to be able to undergo colonoscopy  They are aware that if we do not correct what is causing his bleed that he will continue to most likely bleed  Yesterday I recommended a palliative care consult to discuss symptom management and goals of care possibly consider a POLST  Daughter is power of  and would like to discuss with the palliative team about goals of care and symptom management    · Appreciate palliative care consultation  · Maintain level 3 DNR status  · Family meeting scheduled for tomorrow at 12 noon to review hospice services with patient's wife and children

## 2019-06-27 NOTE — ASSESSMENT & PLAN NOTE
· Per nursing they were following urinary retention protocol and he required 3 straight caths for greater than 350 mL noted in bladder    · Sepulveda catheter placed overnight, thick milky type yellow urine noted  · Consult Urology  · Continued ditropan

## 2019-06-27 NOTE — PROGRESS NOTES
Progress Note - Otis Reyes 6/27/1927, 80 y o  male MRN: [id number]    Unit/Bed#: CW2 213-01 Encounter: [id number]    Primary Care Provider: Kriss Frank DO   Date and time admitted to hospital: 6/23/2019 11:40 AM        * GI bleed  Assessment & Plan  · Presented with bright red blood per rectum followed by melena as documented in medical record  · He denies nausea vomiting abdominal pain  · Personally observed large tarry black stool yesterday  · No further stools noted since yesterday no blood per rectum  · CT of the abdomen and pelvis with mild acute uncomplicated sigmoid diverticulitis, cholelithiasis, renal cyst  · Will discuss with GI about advancing patient's diet  · Discussed with GI yesterday about black tarry stool in concerns for continue blood loss  Patient's daughter reaffirms that she does not want him to go through any aggressive workup  Did have discussion with her about goals of care, palliative medicine met with her and the patient today of which they are going to consider possible home with hospice  · Lactic acid, 2 1, improved to 1 0  · Hemoglobin 9 4 today post unit of blood yesterday, no blood per rectum noted, will discuss with GI if diet can be advanced  · Continue to hold Lovenox and aspirin  · Continue Protonix b i d  · Family OK with PRBC transfusion as needed at this time until they discuss symptom management, and goals of care with palliative Medicine    Acute post-hemorrhagic anemia  Assessment & Plan  · In the setting of GI bleed as evidenced by bright red blood per rectum and hemoglobin of 6 4 on admission    · It is suspected this is a diverticular bleed and patient was on Lovenox status post hip repair  · Received 3 units of packed RBCs this admission  · Today's hemoglobin 9 4, stable  · Per nursing no blood per rectum noted last stool yesterday black and tarry  · Continue to monitor    Goals of care, counseling/discussion  Assessment & Plan  Discussion with patient's daughter and wife at bedside on June 26, 2019 in the presence of patient regarding goals of care and expectations on the patient and family's part regarding continuing to pursue treatment for his underlying suspected diverticular bleed  They do not want aggressive measures as they do not want him uncomfortable they clearly expressed that they want him to be comfortable and not in any distress and his daughter is very adamant about him not undergoing a bowel prep to be able to undergo colonoscopy  They are aware that if we do not correct what is causing his bleed that he will continue to most likely bleed  Yesterday I recommended a palliative care consult to discuss symptom management and goals of care possibly consider a POLST  Daughter is power of  and would like to discuss with the palliative team about goals of care and symptom management  · Appreciate palliative care consultation  · Maintain level 3 DNR status  · Family meeting scheduled for tomorrow at 12 noon to review hospice services with patient's wife and children    Urinary retention  Assessment & Plan  · Per nursing they were following urinary retention protocol and he required 3 straight caths for greater than 350 mL noted in bladder  · Sepulveda catheter placed overnight, thick milky type yellow urine noted  · Consult Urology  · Continued ditropan    JENNYFER on CKD stage 3  Assessment & Plan  · Present on admission with creatinine noted to be 1 90 with baseline creatinine noted to be between 1 2-1 4    · Creatinine improved currently 1 10  · Continue IV hydration, encourage oral intake   · Avoid nephrotoxic agents and hypotension  · Monitor BMP    Rheumatoid arthritis (HCC)  Assessment & Plan  · Continue Prednisone 2 5 mg daily    Dementia  Assessment & Plan  · Supportive care, continue with Zoloft daily  · PT/OT recommending STR on discharge, daughter with plans to take patient home, no plans for short-term rehab patient may likely go home with hospice family to decide within the next 24 hours    Hypertension  Assessment & Plan  · Blood pressure acceptable  · Continue Lopressor with holding parameters   · Monitor      VTE Pharmacologic Prophylaxis:   Pharmacologic: Pharmacologic VTE Prophylaxis contraindicated due to GIB  Mechanical VTE Prophylaxis in Place: Yes    Patient Centered Rounds: I have performed bedside rounds with nursing staff today  Discussions with Specialists or Other Care Team Provider: GI, CM, pallaitive care    Education and Discussions with Family / Patient:  Patient's daughter    Time Spent for Care: 30 minutes  More than 50% of total time spent on counseling and coordination of care as described above  Current Length of Stay: 3 day(s)    Current Patient Status: Inpatient   Certification Statement: The patient will continue to require additional inpatient hospital stay due to Need for safe discharge plan, family considering possible hospice will likely be discharged within the next 48 hours    Discharge Plan:  Home with home health care versus home with hospice family to decide, he is medically stable    Code Status: Level 3 - DNAR and DNI      Subjective:   Patient drowsy upon assessment today easily arouses but falls back asleep  Has not been sleeping well at night  Per nursing no overnight events, no cough or respiratory distress  No nausea vomiting abdominal pain  Objective:     Vitals:   Temp (24hrs), Av 2 °F (37 3 °C), Min:97 8 °F (36 6 °C), Max:102 3 °F (39 1 °C)    Temp:  [97 8 °F (36 6 °C)-102 3 °F (39 1 °C)] 102 3 °F (39 1 °C)  HR:  [66-94] 83  Resp:  [16-18] 18  BP: (128-166)/(49-67) 159/67  SpO2:  [98 %-99 %] 98 %  Body mass index is 22 11 kg/m²  Input and Output Summary (last 24 hours):        Intake/Output Summary (Last 24 hours) at 2019 1528  Last data filed at 2019 1501  Gross per 24 hour   Intake 1016 25 ml   Output 2042 ml   Net -1025 75 ml       Physical Exam:     Physical Exam Constitutional: He appears well-developed  No distress  Chronically ill frail-appearing   HENT:   Head: Normocephalic and atraumatic  Cardiovascular: Normal rate and regular rhythm  Pulmonary/Chest: Effort normal and breath sounds normal  No respiratory distress  Abdominal: Soft  Bowel sounds are normal  He exhibits no distension  There is no tenderness  Musculoskeletal: Normal range of motion  He exhibits no edema  Neurological: He is alert  No cranial nerve deficit  Oriented to person and place, forgetful   Skin: Skin is warm and dry  Capillary refill takes less than 2 seconds  There is pallor  Psychiatric: His behavior is normal    Vitals reviewed  Additional Data:     Labs:    Results from last 7 days   Lab Units 06/27/19  0715   WBC Thousand/uL 13 46*   HEMOGLOBIN g/dL 9 4*   HEMATOCRIT % 30 0*   PLATELETS Thousands/uL 628*   NEUTROS PCT % 77*   LYMPHS PCT % 6*   MONOS PCT % 12   EOS PCT % 3     Results from last 7 days   Lab Units 06/27/19  0714 06/25/19  0457  06/23/19  1157   POTASSIUM mmol/L 3 5 4 2   < >  --    CHLORIDE mmol/L 108 112*   < >  --    CO2 mmol/L 24 22   < >  --    CO2, I-STAT mmol/L  --   --   --  22   BUN mg/dL 20 41*   < >  --    CREATININE mg/dL 1 10 1 48*   < >  --    CALCIUM mg/dL 8 7 8 2*   < >  --    ALK PHOS U/L  --  72   < >  --    ALT U/L  --  16   < >  --    AST U/L  --  12   < >  --    GLUCOSE, ISTAT mg/dl  --   --   --  157*    < > = values in this interval not displayed  * I Have Reviewed All Lab Data Listed Above  * Additional Pertinent Lab Tests Reviewed: All Labs Within Last 24 Hours Reviewed    Imaging:    Imaging Reports Reviewed Today Include:  None  Imaging Personally Reviewed by Myself Includes:  None    Recent Cultures (last 7 days):     Results from last 7 days   Lab Units 06/25/19  1835   BLOOD CULTURE  No Growth at 24 hrs  No Growth at 24 hrs         Last 24 Hours Medication List:     Current Facility-Administered Medications:  acetaminophen 650 mg Oral Q6H PRN Landen Reis MD    vitamin C 250 mg Oral Daily Landen Reis MD    atorvastatin 40 mg Oral QPM Landen Reis MD    docusate sodium 100 mg Oral BID Landen Reis MD    ferrous sulfate 325 mg Oral Daily With Breakfast aLnden Reis MD    gabapentin 300 mg Oral HS Landen Reis MD    metoprolol tartrate 25 mg Oral BID Landen Reis MD    oxybutynin 5 mg Oral Daily Orestes Simms MD    pantoprazole 40 mg Intravenous Q12H [facility] Landen Reis MD    polyethylene glycol 17 g Oral Daily PRN MAZIN Sigala    predniSONE 2 5 mg Oral Daily Landen Reis MD    saccharomyces boulardii 250 mg Oral BID Landen Reis MD    sertraline 50 mg Oral Daily Landen Reis MD    sodium chloride 75 mL/hr Intravenous Continuous Phyllis Holley PA-C Last Rate: 75 mL/hr (06/27/19 9510)        Today, Patient Was Seen By: MAZIN Sigala    ** Please Note: Dictation voice to text software may have been used in the creation of this document   **

## 2019-06-28 LAB
ALBUMIN SERPL BCP-MCNC: 2.1 G/DL (ref 3.5–5)
ALP SERPL-CCNC: 92 U/L (ref 46–116)
ALT SERPL W P-5'-P-CCNC: 27 U/L (ref 12–78)
ANION GAP SERPL CALCULATED.3IONS-SCNC: 8 MMOL/L (ref 4–13)
AST SERPL W P-5'-P-CCNC: 23 U/L (ref 5–45)
BASOPHILS # BLD AUTO: 0.11 THOUSANDS/ΜL (ref 0–0.1)
BASOPHILS NFR BLD AUTO: 1 % (ref 0–1)
BILIRUB SERPL-MCNC: 0.76 MG/DL (ref 0.2–1)
BUN SERPL-MCNC: 15 MG/DL (ref 5–25)
CALCIUM SERPL-MCNC: 8.2 MG/DL (ref 8.3–10.1)
CHLORIDE SERPL-SCNC: 107 MMOL/L (ref 100–108)
CO2 SERPL-SCNC: 25 MMOL/L (ref 21–32)
CREAT SERPL-MCNC: 1 MG/DL (ref 0.6–1.3)
EOSINOPHIL # BLD AUTO: 0.35 THOUSAND/ΜL (ref 0–0.61)
EOSINOPHIL NFR BLD AUTO: 3 % (ref 0–6)
ERYTHROCYTE [DISTWIDTH] IN BLOOD BY AUTOMATED COUNT: 17.1 % (ref 11.6–15.1)
GFR SERPL CREATININE-BSD FRML MDRD: 65 ML/MIN/1.73SQ M
GLUCOSE SERPL-MCNC: 93 MG/DL (ref 65–140)
HCT VFR BLD AUTO: 26.5 % (ref 36.5–49.3)
HGB BLD-MCNC: 8.3 G/DL (ref 12–17)
IMM GRANULOCYTES # BLD AUTO: 0.08 THOUSAND/UL (ref 0–0.2)
IMM GRANULOCYTES NFR BLD AUTO: 1 % (ref 0–2)
LYMPHOCYTES # BLD AUTO: 0.87 THOUSANDS/ΜL (ref 0.6–4.47)
LYMPHOCYTES NFR BLD AUTO: 8 % (ref 14–44)
MCH RBC QN AUTO: 28.4 PG (ref 26.8–34.3)
MCHC RBC AUTO-ENTMCNC: 31.3 G/DL (ref 31.4–37.4)
MCV RBC AUTO: 91 FL (ref 82–98)
MONOCYTES # BLD AUTO: 1.32 THOUSAND/ΜL (ref 0.17–1.22)
MONOCYTES NFR BLD AUTO: 12 % (ref 4–12)
NEUTROPHILS # BLD AUTO: 8.09 THOUSANDS/ΜL (ref 1.85–7.62)
NEUTS SEG NFR BLD AUTO: 75 % (ref 43–75)
NRBC BLD AUTO-RTO: 0 /100 WBCS
PLATELET # BLD AUTO: 503 THOUSANDS/UL (ref 149–390)
PMV BLD AUTO: 9.5 FL (ref 8.9–12.7)
POTASSIUM SERPL-SCNC: 3.3 MMOL/L (ref 3.5–5.3)
PROCALCITONIN SERPL-MCNC: 0.45 NG/ML
PROT SERPL-MCNC: 5.7 G/DL (ref 6.4–8.2)
RBC # BLD AUTO: 2.92 MILLION/UL (ref 3.88–5.62)
SODIUM SERPL-SCNC: 140 MMOL/L (ref 136–145)
WBC # BLD AUTO: 10.82 THOUSAND/UL (ref 4.31–10.16)

## 2019-06-28 PROCEDURE — 99233 SBSQ HOSP IP/OBS HIGH 50: CPT | Performed by: PHYSICIAN ASSISTANT

## 2019-06-28 PROCEDURE — G0515 COGNITIVE SKILLS DEVELOPMENT: HCPCS

## 2019-06-28 PROCEDURE — 97530 THERAPEUTIC ACTIVITIES: CPT

## 2019-06-28 PROCEDURE — 80053 COMPREHEN METABOLIC PANEL: CPT | Performed by: NURSE PRACTITIONER

## 2019-06-28 PROCEDURE — 92610 EVALUATE SWALLOWING FUNCTION: CPT

## 2019-06-28 PROCEDURE — G8996 SWALLOW CURRENT STATUS: HCPCS

## 2019-06-28 PROCEDURE — 84145 PROCALCITONIN (PCT): CPT | Performed by: NURSE PRACTITIONER

## 2019-06-28 PROCEDURE — G8998 SWALLOW D/C STATUS: HCPCS

## 2019-06-28 PROCEDURE — 85025 COMPLETE CBC W/AUTO DIFF WBC: CPT | Performed by: NURSE PRACTITIONER

## 2019-06-28 PROCEDURE — 99232 SBSQ HOSP IP/OBS MODERATE 35: CPT | Performed by: NURSE PRACTITIONER

## 2019-06-28 PROCEDURE — 97535 SELF CARE MNGMENT TRAINING: CPT

## 2019-06-28 PROCEDURE — C9113 INJ PANTOPRAZOLE SODIUM, VIA: HCPCS | Performed by: INTERNAL MEDICINE

## 2019-06-28 RX ORDER — LORAZEPAM 0.5 MG/1
0.5 TABLET ORAL EVERY 8 HOURS PRN
Status: DISCONTINUED | OUTPATIENT
Start: 2019-06-28 | End: 2019-06-28

## 2019-06-28 RX ORDER — LIDOCAINE 40 MG/G
CREAM TOPICAL 4 TIMES DAILY PRN
Status: DISCONTINUED | OUTPATIENT
Start: 2019-06-28 | End: 2019-06-28

## 2019-06-28 RX ORDER — HALOPERIDOL 2 MG/ML
0.5 SOLUTION ORAL EVERY 6 HOURS PRN
Status: DISCONTINUED | OUTPATIENT
Start: 2019-06-28 | End: 2019-06-28

## 2019-06-28 RX ORDER — HYDROMORPHONE HCL/PF 1 MG/ML
0.2 SYRINGE (ML) INJECTION EVERY 4 HOURS PRN
Status: DISCONTINUED | OUTPATIENT
Start: 2019-06-28 | End: 2019-06-28

## 2019-06-28 RX ORDER — ATORVASTATIN CALCIUM 40 MG/1
40 TABLET, FILM COATED ORAL EVERY EVENING
Status: DISCONTINUED | OUTPATIENT
Start: 2019-06-28 | End: 2019-07-02 | Stop reason: HOSPADM

## 2019-06-28 RX ORDER — MULTIVIT WITH MINERALS/LUTEIN
250 TABLET ORAL DAILY
Status: DISCONTINUED | OUTPATIENT
Start: 2019-06-29 | End: 2019-07-02 | Stop reason: HOSPADM

## 2019-06-28 RX ORDER — LIDOCAINE 40 MG/G
CREAM TOPICAL 4 TIMES DAILY PRN
Status: DISCONTINUED | OUTPATIENT
Start: 2019-06-28 | End: 2019-07-02 | Stop reason: HOSPADM

## 2019-06-28 RX ORDER — GABAPENTIN 300 MG/1
300 CAPSULE ORAL 2 TIMES DAILY
Status: DISCONTINUED | OUTPATIENT
Start: 2019-06-28 | End: 2019-07-02 | Stop reason: HOSPADM

## 2019-06-28 RX ORDER — SODIUM CHLORIDE 450 MG/100ML
75 INJECTION, SOLUTION INTRAVENOUS CONTINUOUS
Status: DISCONTINUED | OUTPATIENT
Start: 2019-06-28 | End: 2019-06-30

## 2019-06-28 RX ORDER — POTASSIUM CHLORIDE 20 MEQ/1
40 TABLET, EXTENDED RELEASE ORAL ONCE
Status: COMPLETED | OUTPATIENT
Start: 2019-06-28 | End: 2019-06-28

## 2019-06-28 RX ORDER — OXYCODONE HYDROCHLORIDE 5 MG/1
2.5 TABLET ORAL EVERY 4 HOURS PRN
Status: DISCONTINUED | OUTPATIENT
Start: 2019-06-28 | End: 2019-07-02 | Stop reason: HOSPADM

## 2019-06-28 RX ORDER — ACETAMINOPHEN 160 MG/5ML
650 SUSPENSION, ORAL (FINAL DOSE FORM) ORAL ONCE
Status: COMPLETED | OUTPATIENT
Start: 2019-06-28 | End: 2019-06-28

## 2019-06-28 RX ORDER — SENNOSIDES 8.6 MG
1 TABLET ORAL
Status: DISCONTINUED | OUTPATIENT
Start: 2019-06-28 | End: 2019-07-02 | Stop reason: HOSPADM

## 2019-06-28 RX ADMIN — DOCUSATE SODIUM 100 MG: 100 CAPSULE, LIQUID FILLED ORAL at 10:05

## 2019-06-28 RX ADMIN — POTASSIUM CHLORIDE 40 MEQ: 1500 TABLET, EXTENDED RELEASE ORAL at 10:04

## 2019-06-28 RX ADMIN — CEFTRIAXONE 1000 MG: 1 INJECTION, POWDER, FOR SOLUTION INTRAMUSCULAR; INTRAVENOUS at 10:06

## 2019-06-28 RX ADMIN — SERTRALINE HYDROCHLORIDE 50 MG: 50 TABLET ORAL at 10:05

## 2019-06-28 RX ADMIN — GABAPENTIN 300 MG: 300 CAPSULE ORAL at 17:12

## 2019-06-28 RX ADMIN — LIDOCAINE 4%: 4 CREAM TOPICAL at 10:17

## 2019-06-28 RX ADMIN — POLYETHYLENE GLYCOL 3350 17 G: 17 POWDER, FOR SOLUTION ORAL at 10:04

## 2019-06-28 RX ADMIN — SODIUM CHLORIDE 75 ML/HR: 0.45 INJECTION, SOLUTION INTRAVENOUS at 17:15

## 2019-06-28 RX ADMIN — DOCUSATE SODIUM 100 MG: 100 CAPSULE, LIQUID FILLED ORAL at 17:14

## 2019-06-28 RX ADMIN — PANTOPRAZOLE SODIUM 40 MG: 40 INJECTION, POWDER, FOR SOLUTION INTRAVENOUS at 10:06

## 2019-06-28 RX ADMIN — OXYBUTYNIN CHLORIDE 5 MG: 5 TABLET, EXTENDED RELEASE ORAL at 10:06

## 2019-06-28 RX ADMIN — FERROUS SULFATE TAB 325 MG (65 MG ELEMENTAL FE) 325 MG: 325 (65 FE) TAB at 06:41

## 2019-06-28 RX ADMIN — LIDOCAINE 4%: 4 CREAM TOPICAL at 17:16

## 2019-06-28 RX ADMIN — OXYCODONE HYDROCHLORIDE 2.5 MG: 5 TABLET ORAL at 17:10

## 2019-06-28 RX ADMIN — Medication 250 MG: at 10:07

## 2019-06-28 RX ADMIN — PANTOPRAZOLE SODIUM 40 MG: 40 INJECTION, POWDER, FOR SOLUTION INTRAVENOUS at 21:16

## 2019-06-28 RX ADMIN — PREDNISONE 2.5 MG: 2.5 TABLET ORAL at 10:07

## 2019-06-28 RX ADMIN — ATORVASTATIN CALCIUM 40 MG: 40 TABLET, FILM COATED ORAL at 17:14

## 2019-06-28 RX ADMIN — SODIUM CHLORIDE 75 ML/HR: 4.5 INJECTION, SOLUTION INTRAVENOUS at 10:10

## 2019-06-28 RX ADMIN — ACETAMINOPHEN 650 MG: 160 SUSPENSION ORAL at 01:03

## 2019-06-28 RX ADMIN — ACETAMINOPHEN 650 MG: 325 TABLET ORAL at 06:42

## 2019-06-28 RX ADMIN — SENNOSIDES 8.6 MG: 8.6 TABLET, FILM COATED ORAL at 22:59

## 2019-06-28 RX ADMIN — Medication 250 MG: at 17:13

## 2019-06-28 RX ADMIN — OXYCODONE HYDROCHLORIDE 2.5 MG: 5 TABLET ORAL at 22:59

## 2019-06-28 NOTE — ASSESSMENT & PLAN NOTE
· Supportive care, continue with Zoloft daily  · PT/OT recommending STR on discharge, daughter with plans to take patient home, no plans for short-term rehab patient may likely go home with hospice family to decide within the next 48 hours

## 2019-06-28 NOTE — NURSING NOTE
At 2300 while administering meds, pt noted to have a black coating on tongue  RN attempted to scrub at it with green sponge on stick dipped in mouthwash  Color does not appear to come off on sponge  No oreos or chocolate pudding noted at bedside  ROOSEVELT Reynoso) made aware  RN advised to continue oral care for now and continue to monitor

## 2019-06-28 NOTE — HOSPICE NOTE
Met with daughter, Ferdinand Friend, discussed home hospice at length and in detail  Family is gathering information at this time and think they will have decision on Monday as to whether they want hospice or possibly home health with P T  Palliative care aware  Dr Ronny Sun would like to see how hgb does over wkend  Left voicemail for [facility] June  Family has weekend liaison number, hospice main number and week day liaison numbers for any further questions

## 2019-06-28 NOTE — PLAN OF CARE
Problem: OCCUPATIONAL THERAPY ADULT  Goal: Performs self-care activities at highest level of function for planned discharge setting  See evaluation for individualized goals  Description  Treatment Interventions: ADL retraining, Functional transfer training, UE strengthening/ROM, Endurance training, Cognitive reorientation, Patient/family training, Equipment evaluation/education, Compensatory technique education, Energy conservation, Activityengagement          See flowsheet documentation for full assessment, interventions and recommendations  Outcome: Progressing  Note:   Limitation: Decreased ADL status, Decreased UE strength, Decreased Safe judgement during ADL, Decreased cognition, Decreased endurance, Decreased self-care trans, Decreased high-level ADLs  Prognosis: Fair, Good  Assessment: Patient participated in skilled OT with focus on adl/self care skills, bed mobiity skills, activity endurance, cognitive reorientation skills  Patient presents supine in bed lying on right side participating in bathing  Patient requires mod to max assist  Patient requires mod vc's to remain engaged in activity secondary to fatigue and pain  Patient complains of mod pain with functional movement particularly LE's  Patient reports highest pain currently right knee  Patient would benefit from [address] with focus on increasing strength and endurance, increasing functional functional transfers and mobiity skills, increasing independence with self care/adl skills for carryover into his own enviroment        OT Discharge Recommendation: Short Term Rehab  OT - OK to Discharge: (when medically cleared)  Chemo Machado

## 2019-06-28 NOTE — OCCUPATIONAL THERAPY NOTE
Occupational Therapy Treatment Note:       06/28/19 1145   Restrictions/Precautions   Other Precautions Cognitive; Bed Alarm; Fall Risk   Pain Assessment   Pain Assessment 0-10   Pain Score Worst Possible Pain   Pain Location Leg   Pain Orientation Right   ADL   Where Assessed Supine, bed   Grooming Assistance 3  Moderate Assistance   Grooming Deficit Setup;Verbal cueing; Increased time to complete;Wash/dry hands; Wash/dry face   UB Bathing Assistance 2  Maximal Assistance   UB Bathing Deficit Setup;Verbal cueing;Supervision/safety; Increased time to complete; Chest;Abdomen   UB Bathing Comments   (assist with back area)   LB Bathing Assistance 2  Maximal Assistance   LB Bathing Deficit Setup;Supervision/safety; Increased time to complete;Perineal area; Buttocks;Right upper leg;Left upper leg;Right lower leg including foot; Left lower leg including foot   UB Dressing Assistance 3  Moderate Assistance   UB Dressing Deficit Setup;Verbal cueing;Supervision/safety; Increased time to complete; Thread RUE; Thread LUE   Bed Mobility   Rolling R 3  Moderate assistance   Additional items Assist x 2; Increased time required;Verbal cues;LE management   Rolling L 3  Moderate assistance   Additional items Assist x 2; Increased time required;Verbal cues;LE management   Transfers   Sit to Stand 2  Maximal assistance   Additional items Assist x 2; Increased time required;Verbal cues   Stand to Sit 2  Maximal assistance   Additional items Assist x 2; Increased time required;Verbal cues   Cognition   Overall Cognitive Status Impaired   Arousal/Participation Cooperative   Attention Attends with cues to redirect   Orientation Level Oriented to person;Oriented to place   Memory Decreased short term memory;Decreased recall of recent events;Decreased recall of precautions   Following Commands Follows one step commands with increased time or repetition   Comments pleasant and cooperative requires mod vc's to remain engaged in activity   Activity Tolerance Activity Tolerance Patient limited by fatigue;Patient limited by pain   Medical Staff Made Aware ok to treat per NSG   Assessment   Assessment Patient participated in skilled OT with focus on adl/self care skills, bed mobiity skills, activity endurance, cognitive reorientation skills  Patient presents supine in bed lying on right side participating in bathing  Patient requires mod to max assist  Patient requires mod vc's to remain engaged in activity secondary to fatigue and pain  Patient complains of mod pain with functional movement particularly LE's  Patient reports highest pain currently right knee  Patient would benefit from [address] with focus on increasing strength and endurance, increasing functional functional transfers and mobiity skills, increasing independence with self care/adl skills for carryover into his own enviroment  Plan   Treatment Interventions ADL retraining; Endurance training;Cognitive reorientation   Goal Expiration Date 07/08/19   Treatment Day 1   OT Frequency 2-3x/wk   Recommendation   OT Discharge Recommendation Short Term Rehab   OT - OK to Discharge   (when medically cleared)   Cecilia Mcintyre

## 2019-06-28 NOTE — PROGRESS NOTES
Progress note - Palliative and Supportive Care   Roberto Dutta 80 y o  male [id number]    Assessment:  Patient Active Problem List   Diagnosis    GI bleed    Weakness    Hypertension    CAD (coronary artery disease)    [facility]    Hyperlipidemia LDL goal <100    Back pain    Anemia, acute on chronic    Dementia    Idiopathic peripheral neuropathy    JENNYFER (acute kidney injury) ([facility] )    Actinic keratosis    Adhesive capsulitis of right shoulder    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    Allergic rhinitis    Arthropathy of multiple sites    Benign colon polyp    Benign essential hypertension    Chronic back pain    Diaphragmatic hernia    Gout    Hemorrhoids    Iron deficiency anemia due to chronic blood loss    Lumbar disc disease    Mitral valve disorder    Depression    Overactive bladder    Prediabetes    Rheumatoid arthritis (HCC)    Tricuspid valve disorders, non-rheumatic    Immunization due    Pain in both feet    Peripheral arteriosclerosis (HCC)    Onychomycosis    Paronychia of toenail    Tinea pedis of both feet    CVA (cerebral vascular accident) ([place] [address] )    Normocytic anemia    Displaced intertrochanteric fracture of right femur, initial encounter for closed fracture ([place] [address] )    Preoperative examination    Aortic stenosis, mild    Abnormal TSH    JENNYFER on CKD stage 3    Urinary retention    Fever    Goals of care, counseling/discussion    Acute post-hemorrhagic anemia     Plan:  1  Symptom management -    - oxycodone 2 5mg PO Q4H PRN mod-severe pain   - LMX cream for discomfort    - continue urinary catheter and ABX for UTI    2   Goals - level 3 DNR  PALLIATIVE AND SUPPORTIVE CARE FAMILY CONFERENCE:  Time of Meetinpm  Participants: Patient's spouse, Pamela Sanchez, and children Janice and [address] [place] [address]  Patient Participation: unable to participate secondary to AMS  Meeting Location: MICU conference room  Advanced Directive of POLST available: no    A family meeting was necessary for determine the appropriate course of treatment  Topics of Discussion:  · Provided general medical overview of both chronic and acute issues  · Discussed options including continuing current path of treatment guided care with limits of no colonoscopy, invasive procedures, and DNR status vs comfort guided care  · Answered questions regarding comfort guided care, consideration of hospice at home vs in a facility    PLAN:  · Family met with hospice liaison, considering home hospice but would like to monitor through weekend to determine if patient improves in which case they may elect home PT/OT vs home hospice  · Palliative care will follow peripherally over the weekend, but please notify if there are acute concerns regarding symptom management or goals of care  Time Involved in Meetin minutes, beginning at approximately  12pm and ending at approximately 1pm         Code Status: comfort - Level 4   Decisional apparatus:  Patient is not competent on my exam today  If competence is lost, patient's substitute decision maker would default to patient's spouse by [place]  Mrs Jackie Mendoza requires assistance in decision making secondary to slow processing and therefor is supported by 2 of her 3 children during this decision making process  Advance Directive / Living Will / POLST:  None completed    Interval history:       Hgb trended down to 8 3 from 9 4 but no bloody BM overnight  Per report patient was experiencing pain this morning but this improved with lidocaine cream  Ate breakfast  Otherwise without complaints  Verbal interaction limited secondary to dementia       MEDICATIONS / ALLERGIES:     all current active meds have been reviewed and current meds:   Current Facility-Administered Medications   Medication Dose Route Frequency    cefTRIAXone (ROCEPHIN) 1,000 mg in dextrose 5 % 50 mL IVPB  1,000 mg Intravenous Q24H    docusate sodium (COLACE) capsule 100 mg  100 mg Oral BID    ferrous sulfate tablet 325 mg  325 mg Oral Daily With Breakfast    gabapentin (NEURONTIN) capsule 300 mg  300 mg Oral HS    haloperidol (HALDOL) oral concentrated solution 0 5 mg  0 5 mg Oral Q6H PRN    HYDROmorphone (DILAUDID) injection 0 2 mg  0 2 mg Intravenous Q4H PRN    lidocaine (LMX) 4 % cream   Topical 4x Daily PRN    LORazepam (ATIVAN) tablet 0 5 mg  0 5 mg Oral Q8H PRN    oxybutynin (DITROPAN-XL) 24 hr tablet 5 mg  5 mg Oral Daily    oxyCODONE (ROXICODONE) IR tablet 2 5 mg  2 5 mg Oral Q4H PRN    pantoprazole (PROTONIX) injection 40 mg  40 mg Intravenous Q12H TOSIN    polyethylene glycol (MIRALAX) packet 17 g  17 g Oral Daily PRN    predniSONE tablet 2 5 mg  2 5 mg Oral Daily    saccharomyces boulardii (FLORASTOR) capsule 250 mg  250 mg Oral BID    sertraline (ZOLOFT) tablet 50 mg  50 mg Oral Daily       No Known Allergies    OBJECTIVE:    Physical Exam  Physical Exam   Constitutional:   Appears frail, chronically ill, but in no distress   HENT:   Head: Normocephalic and atraumatic  Eyes: Conjunctivae are normal    Cardiovascular: Normal rate  Pulmonary/Chest: Effort normal and breath sounds normal    Abdominal: Soft  Bowel sounds are normal    Neurological: He is alert  Pleasantly confused, minimal verbal interraction   Skin: Skin is warm and dry  Psychiatric:   Flat affect       Lab Results:   I have personally reviewed pertinent labs  , CBC:   Lab Results   Component Value Date    WBC 10 82 (H) 06/28/2019    HGB 8 3 (L) 06/28/2019    HCT 26 5 (L) 06/28/2019    MCV 91 06/28/2019     (H) 06/28/2019    MCH 28 4 06/28/2019    MCHC 31 3 (L) 06/28/2019    RDW 17 1 (H) 06/28/2019    MPV 9 5 06/28/2019    NRBC 0 06/28/2019   , CMP:   Lab Results   Component Value Date    SODIUM 140 06/28/2019    K 3 3 (L) 06/28/2019     06/28/2019    CO2 25 06/28/2019    BUN 15 06/28/2019    CREATININE 1 00 06/28/2019    CALCIUM 8 2 (L) 06/28/2019    AST 23 06/28/2019    ALT 27 06/28/2019 ALKPHOS 92 06/28/2019    EGFR 65 06/28/2019     Imaging Studies: no new  EKG, Pathology, and Other Studies: no new    Counseling / Coordination of Care  Total floor / unit time spent today 60+ minutes  Greater than 50% of total time was spent with the patient and / or family counseling and / or coordination of care   A description of the counseling / coordination of care: time spent with patient, his family, communication with primary team, nurse, and hospice liaison

## 2019-06-28 NOTE — ASSESSMENT & PLAN NOTE
· Patient failed retention protocol and required Sepulveda catheter insertion  · Sepulveda catheter placed overnight, thick milky type yellow urine noted, has been noted to be clear rudy today  · Urine culture pending  · Appreciate urology consultation, recommendations noted  · Will maintain Sepulveda for now if patient decides on hospice will discharge with Sepulveda intact otherwise will attempt voiding trial as per Urology recommendations between day 10 to 14 of Sepulveda catheter placement    · Continued ditropan

## 2019-06-28 NOTE — SPEECH THERAPY NOTE
Bedside Swallow Evaluation:    Summary:  Pt presents w/ moderate oropharyngeal dysphagia characterized by prolonged a-p transfer time with puree solids and delayed swallow initiation time, with probable spillage into pharynx before initiating swallow, with puree solids and thin liquids  Despite this, no signs of aspiration observed  From H&P: Mattie Ruiz is a 80 y o  male who presents with GI bleed  This is a 63-year-old male with past medical history of chronic anemia, previous CVA, right femur fracture status post internal fixation by orthopedic surgeon, history of memory loss/dementia, atrial fibrillation not on anticoagulation secondary to risk of falls, previous GI bleed, found to be diverticular that time, was recently discharged about Friday from rehabilitation center home  As per family, the patient was having a very good day on Saturday after discharge from rehabilitation without any symptoms or complaints  He is recovering well after surgery, walks with walker, has completed physical therapy as prescribed at least on an inpatient settings  However, this morning around 4:30 a m  Woke up and had to use the toilet and family noted that he had bowel movement that was almost completely bloody  The 2nd with movement similar to the 1st followed around 9:30 a m  Brannon Alcazar At that point, the family called 911 for patient to be brought to the hospital for further evaluation of GI bleed  Has not had any bowel movement in the hospital however he was found to have melanotic stool on examination in ED  Also, EMS reported transient hypoxia tachycardia and hypotension which was not found upon arrival in the ER and vital signs at that point were found to be stable  Hemoglobin stable when compared to previous recent baseline  At present time, the patient appears to be upset to be again in the hospital and is not providing history of complaints   Family is considering discharge on hospice care at this time     The patient has been on a clear liquid diet, secondary to GI bleed  He is taking medications crushed in puree, but is having oral holding and delayed transfer time  Dysphagia consult received to assess for medication tolerance  Recommendations:  Diet: Clear liquid  Liquid: Thin liquids   Meds: Crush in puree  Give liquid wash to help clear  Supervision: 1:1  Positioning:Upright  Strategies: Pt to take PO/Meds only when fully alert and upright  Oral care: Frequent  Patient with black coated tongue  ?iron  Aspiration precautions    Therapy Prognosis: Guarded  Prognosis considerations: Age and medical status  Frequency: 1-2 f/u sessions    Consider consult w/:  None at this time     Reason for consult:  Nursing staff report pill dysphagia    Precautions:  None    Current diet:  Clear liquid  Premorbid diet[de-identified]  Regular with thin liquids   Previous VBS:  None  O2 requirement:  None  Voice/Speech:  WFL  Answers simple yes/no questions  Social:  Recently discharged from short term rehab home with family  Follows commands: No                        Cognitive Status:  Awake and alert, but fatigues quickly  Oral mech exam:  Dentition: Natural  Not formally assessed, but appears [facility] for PO intake     Items administered:  Puree, thin liquids, meds crushed in applesauce  Liquids were taken by straw  Oral stage: Moderate  Lip closure: WFL  Mastication: N/A  Bolus formation: WFL  Bolus control: WFL  Transfer: Prolonged  Oral residue: Some  Eventually all cleared with liquid wash  Pocketing: No    Pharyngeal stage: Moderate  Swallow promptness: Delayed  Laryngeal rise: WFL  Wet voice: No  Throat clear: No  Cough: No  Secondary swallows: No  Audible swallows: No    Esophageal stage:  No s/s reported    Aspiration precautions posted    Results d/w:  Nursing    Goal(s):  Pt will tolerate least restrictive diet w/out s/s aspiration or oral/pharyngeal difficulties

## 2019-06-28 NOTE — ASSESSMENT & PLAN NOTE
· Palliative care meeting with patient's wife, daughter and son occurred today  Please refer to palliative Medicine note for details  Family would like to give patient another 24-48 hours to see if he has a bowel movement and if there is any evidence of bleeding noted  They are leaning towards hospice but have yet to make a full decision  He remains level 3 DNR  Did experience significant pain in his feet today therefore Neurontin was increased to b i d  As prior to hospitalization and lidocaine lotion was ordered for feet to see if this would help

## 2019-06-28 NOTE — ASSESSMENT & PLAN NOTE
· Patient with temperature 100 9 at midnight none since l  · Has been having fluctuations in temperatures that do respond to Tylenol  · Blood cultures thus far negative  · Urine culture pending  · Will monitor closely suspect in light of recent blood transfusions temperature may be related

## 2019-06-28 NOTE — ASSESSMENT & PLAN NOTE
· In the setting of GI bleed as evidenced by bright red blood per rectum and hemoglobin of 6 4 on admission    · It is suspected this is a diverticular bleed and patient was on Lovenox status post hip repair  · Received 3 units of packed RBCs this admission  · Today's hemoglobin declined to 8 3 today was 9 4 yesterday  · Per nursing no blood per rectum noted last stool yesterday black and tarry 2 days ago  · Continue to monitor

## 2019-06-28 NOTE — ASSESSMENT & PLAN NOTE
· Secondary to suspected diverticular bleed and the use of lovenox post hip repair 5/2019,   · Presented with bright red blood per rectum followed by melena as documented in medical record  · He denies nausea vomiting abdominal pain  · Personally observed large tarry black stool 2 days ago which was his last documented stool  · No further stools noted since no blood per rectum  · At the request of patient's daughter will continue bowel regimen, she would like him data bowel movement prior to being discharged to assess if he has any further bleeding  · CT of the abdomen and pelvis with mild acute uncomplicated sigmoid diverticulitis, cholelithiasis, renal cyst  · Increased to regular diet as tolerated  · Discussed with GI 2 days ago about black tarry stool in concerns for continue blood loss  Patient's daughter reaffirms that she does not want him to go through any aggressive workup  Did have discussion with her about goals of care, palliative medicine met with her and the patient today of which they are going to consider possible home with hospice  · Lactic acid, 2 1, improved to 1 0  · Hemoglobin 8 3, status post unit of blood 2 days ago, no blood per rectum noted  · Continue to hold Lovenox and aspirin  · Continue Protonix b i d    · Family OK with PRBC transfusion as needed at this time until they discuss symptom management, and goals of care with palliative Medicine

## 2019-06-28 NOTE — ASSESSMENT & PLAN NOTE
· Present on admission with creatinine noted to be 1 90 with baseline creatinine noted to be between 1 2-1 4    · Creatinine improved currently 1 00  · Continue IV hydration, encourage oral intake   · Avoid nephrotoxic agents and hypotension  · Monitor BMP

## 2019-06-28 NOTE — PROGRESS NOTES
Progress Note - Susy Barry 6/27/1927, 80 y o  male MRN: [id number]    Unit/Bed#: CW2 213-01 Encounter: [id number]    Primary Care Provider: Damaris Delatorre DO   Date and time admitted to hospital: 6/23/2019 11:40 AM        * GI bleed  Assessment & Plan  · Secondary to suspected diverticular bleed and the use of lovenox post hip repair 5/2019,   · Presented with bright red blood per rectum followed by melena as documented in medical record  · He denies nausea vomiting abdominal pain  · Personally observed large tarry black stool 2 days ago which was his last documented stool  · No further stools noted since no blood per rectum  · At the request of patient's daughter will continue bowel regimen, she would like him data bowel movement prior to being discharged to assess if he has any further bleeding  · CT of the abdomen and pelvis with mild acute uncomplicated sigmoid diverticulitis, cholelithiasis, renal cyst  · Increased to regular diet as tolerated  · Discussed with GI 2 days ago about black tarry stool in concerns for continue blood loss  Patient's daughter reaffirms that she does not want him to go through any aggressive workup  Did have discussion with her about goals of care, palliative medicine met with her and the patient today of which they are going to consider possible home with hospice  · Lactic acid, 2 1, improved to 1 0  · Hemoglobin 8 3, status post unit of blood 2 days ago, no blood per rectum noted  · Continue to hold Lovenox and aspirin  · Continue Protonix b i d  · Family OK with PRBC transfusion as needed at this time until they discuss symptom management, and goals of care with palliative Medicine    Acute post-hemorrhagic anemia  Assessment & Plan  · In the setting of GI bleed as evidenced by bright red blood per rectum and hemoglobin of 6 4 on admission    · It is suspected this is a diverticular bleed and patient was on Lovenox status post hip repair  · Received 3 units of packed RBCs this admission  · Today's hemoglobin declined to 8 3 today was 9 4 yesterday  · Per nursing no blood per rectum noted last stool yesterday black and tarry 2 days ago  · Continue to monitor    Goals of care, counseling/discussion  Assessment & Plan  · Palliative care meeting with patient's wife, daughter and son occurred today  Please refer to palliative Medicine note for details  Family would like to give patient another 24-48 hours to see if he has a bowel movement and if there is any evidence of bleeding noted  They are leaning towards hospice but have yet to make a full decision  He remains level 3 DNR  Did experience significant pain in his feet today therefore Neurontin was increased to b i d  As prior to hospitalization and lidocaine lotion was ordered for feet to see if this would help  Fever  Assessment & Plan  · Patient with temperature 100 9 at midnight none since l  · Has been having fluctuations in temperatures that do respond to Tylenol  · Blood cultures thus far negative  · Urine culture pending  · Will monitor closely suspect in light of recent blood transfusions temperature may be related  Urinary retention  Assessment & Plan  · Patient failed retention protocol and required Sepulveda catheter insertion  · Sepulveda catheter placed overnight, thick milky type yellow urine noted, has been noted to be clear rudy today  · Urine culture pending  · Appreciate urology consultation, recommendations noted  · Will maintain Sepulveda for now if patient decides on hospice will discharge with Sepulveda intact otherwise will attempt voiding trial as per Urology recommendations between day 10 to 14 of Sepulveda catheter placement  · Continued ditropan    JENNYFER on CKD stage 3  Assessment & Plan  · Present on admission with creatinine noted to be 1 90 with baseline creatinine noted to be between 1 2-1 4    · Creatinine improved currently 1 00  · Continue IV hydration, encourage oral intake   · Avoid nephrotoxic agents and hypotension  · Monitor BMP    Rheumatoid arthritis (HCC)  Assessment & Plan  · Continue Prednisone 2 5 mg daily    Dementia  Assessment & Plan  · Supportive care, continue with Zoloft daily  · PT/OT recommending STR on discharge, daughter with plans to take patient home, no plans for short-term rehab patient may likely go home with hospice family to decide within the next 48 hours    Hypertension  Assessment & Plan  · Blood pressure acceptable  · Continue Lopressor with holding parameters   · Monitor      VTE Pharmacologic Prophylaxis:   Pharmacologic: Pharmacologic VTE Prophylaxis contraindicated due to GIB  Mechanical VTE Prophylaxis in Place: Yes    Patient Centered Rounds: I have performed bedside rounds with nursing staff today  Discussions with Specialists or Other Care Team Provider: CM, palliative care, hospice liason    Education and Discussions with Family / Patient: patient, wife, daughter and son at bedside    Time Spent for Care: 30 minutes  More than 50% of total time spent on counseling and coordination of care as described above  Current Length of Stay: 4 day(s)    Current Patient Status: Inpatient   Certification Statement: The patient will continue to require additional inpatient hospital stay due to family to decide on hospice and needs close monitoring or labs and on IV abx awaiting UC    Discharge Plan: home with [address] versus hospice, likely Monday    Code Status: Level 3 - DNAR and DNI      Subjective:   Reports feeling tired  Was also reporting significant bilat burning foot pain ( has known neuropathy)  No stools, no BPR as per nsg  Appetite poor  No dizziness or HA  No cough or SOB  nsg concerned that he was noted to pocket meds and food      Objective:     Vitals:   Temp (24hrs), Av 3 °F (37 4 °C), Min:97 4 °F (36 3 °C), Max:100 9 °F (38 3 °C)    Temp:  [97 4 °F (36 3 °C)-100 9 °F (38 3 °C)] 97 4 °F (36 3 °C)  HR:  [65-77] 67  Resp:  [16-18] 18  BP: (116-153)/(55-90) 116/55  SpO2:  [97 %-98 %] 98 %  Body mass index is 22 03 kg/m²  Input and Output Summary (last 24 hours): Intake/Output Summary (Last 24 hours) at 6/28/2019 1707  Last data filed at 6/28/2019 1400  Gross per 24 hour   Intake 2092 5 ml   Output 1525 ml   Net 567 5 ml       Physical Exam:     Physical Exam   Constitutional: He appears well-developed  No distress  Frail thin appearing   HENT:   Head: Normocephalic and atraumatic  Mouth/Throat: Oropharynx is clear and moist    Neck: Normal range of motion  Neck supple  Cardiovascular: Normal rate  Pulmonary/Chest: Effort normal and breath sounds normal  No respiratory distress  Abdominal: Soft  Bowel sounds are normal  He exhibits no distension  Musculoskeletal: Normal range of motion  He exhibits no edema  Neurological: He is alert  Skin: Skin is warm and dry  There is pallor  Psychiatric: He has a normal mood and affect  His behavior is normal    Vitals reviewed  Additional Data:     Labs:    Results from last 7 days   Lab Units 06/28/19  0628   WBC Thousand/uL 10 82*   HEMOGLOBIN g/dL 8 3*   HEMATOCRIT % 26 5*   PLATELETS Thousands/uL 503*   NEUTROS PCT % 75   LYMPHS PCT % 8*   MONOS PCT % 12   EOS PCT % 3     Results from last 7 days   Lab Units 06/28/19  0628  06/23/19  1157   POTASSIUM mmol/L 3 3*   < >  --    CHLORIDE mmol/L 107   < >  --    CO2 mmol/L 25   < >  --    CO2, I-STAT mmol/L  --   --  22   BUN mg/dL 15   < >  --    CREATININE mg/dL 1 00   < >  --    CALCIUM mg/dL 8 2*   < >  --    ALK PHOS U/L 92   < >  --    ALT U/L 27   < >  --    AST U/L 23   < >  --    GLUCOSE, ISTAT mg/dl  --   --  157*    < > = values in this interval not displayed  * I Have Reviewed All Lab Data Listed Above  * Additional Pertinent Lab Tests Reviewed:  All Labs Within Last 24 Hours Reviewed    Imaging:    Imaging Reports Reviewed Today Include: none  Imaging Personally Reviewed by Myself Includes:  none    Recent Cultures (last 7 days):     Results from last 7 days   Lab Units 06/27/19  1642 06/25/19  1835   BLOOD CULTURE   --  No Growth at 48 hrs  No Growth at 48 hrs  URINE CULTURE  Culture results to follow  --        Last 24 Hours Medication List:     Current Facility-Administered Medications:  [START ON 6/29/2019] vitamin C 250 mg Oral Daily Ashley Araya PA-C    atorvastatin 40 mg Oral QPM Ashley Araya PA-C    cefTRIAXone 1,000 mg Intravenous Q24H MAZIN Tejeda Last Rate: 1,000 mg (06/28/19 1006)   docusate sodium 100 mg Oral BID Korey Field MD    ferrous sulfate 325 mg Oral Daily With Breakfast Korey Field MD    gabapentin 300 mg Oral BID MAZIN Montes    lidocaine  Topical 4x Daily PRN MAZIN Montes    metoprolol tartrate 25 mg Oral BID Smita Maxwell PA-C    oxybutynin 5 mg Oral Daily Pinky Lee MD    oxyCODONE 2 5 mg Oral Q4H PRN Smita Maxwell PA-C    pantoprazole 40 mg Intravenous Q12H [address] Korey Field MD    polyethylene glycol 17 g Oral Daily PRN MAZIN Montes    predniSONE 2 5 mg Oral Daily Korey Field MD    saccharomyces boulardii 250 mg Oral BID Korey Field MD    senna 1 tablet Oral HS Ashley Araya PA-C    sertraline 50 mg Oral Daily Korey Field MD    sodium chloride 75 mL/hr Intravenous Continuous Smita Maxwell PA-C         Today, Patient Was Seen By: MAZIN Montes    ** Please Note: Dictation voice to text software may have been used in the creation of this document   **

## 2019-06-29 LAB
ANION GAP SERPL CALCULATED.3IONS-SCNC: 7 MMOL/L (ref 4–13)
BACTERIA UR CULT: ABNORMAL
BACTERIA UR CULT: ABNORMAL
BASOPHILS # BLD AUTO: 0.13 THOUSANDS/ΜL (ref 0–0.1)
BASOPHILS NFR BLD AUTO: 1 % (ref 0–1)
BUN SERPL-MCNC: 12 MG/DL (ref 5–25)
CALCIUM SERPL-MCNC: 8.3 MG/DL (ref 8.3–10.1)
CHLORIDE SERPL-SCNC: 107 MMOL/L (ref 100–108)
CO2 SERPL-SCNC: 23 MMOL/L (ref 21–32)
CREAT SERPL-MCNC: 0.92 MG/DL (ref 0.6–1.3)
EOSINOPHIL # BLD AUTO: 0.47 THOUSAND/ΜL (ref 0–0.61)
EOSINOPHIL NFR BLD AUTO: 4 % (ref 0–6)
ERYTHROCYTE [DISTWIDTH] IN BLOOD BY AUTOMATED COUNT: 16.4 % (ref 11.6–15.1)
GFR SERPL CREATININE-BSD FRML MDRD: 72 ML/MIN/1.73SQ M
GLUCOSE SERPL-MCNC: 93 MG/DL (ref 65–140)
HCT VFR BLD AUTO: 27.3 % (ref 36.5–49.3)
HGB BLD-MCNC: 8.7 G/DL (ref 12–17)
IMM GRANULOCYTES # BLD AUTO: 0.07 THOUSAND/UL (ref 0–0.2)
IMM GRANULOCYTES NFR BLD AUTO: 1 % (ref 0–2)
LYMPHOCYTES # BLD AUTO: 0.89 THOUSANDS/ΜL (ref 0.6–4.47)
LYMPHOCYTES NFR BLD AUTO: 8 % (ref 14–44)
MAGNESIUM SERPL-MCNC: 1.8 MG/DL (ref 1.6–2.6)
MCH RBC QN AUTO: 28.8 PG (ref 26.8–34.3)
MCHC RBC AUTO-ENTMCNC: 31.9 G/DL (ref 31.4–37.4)
MCV RBC AUTO: 90 FL (ref 82–98)
MONOCYTES # BLD AUTO: 1.15 THOUSAND/ΜL (ref 0.17–1.22)
MONOCYTES NFR BLD AUTO: 11 % (ref 4–12)
NEUTROPHILS # BLD AUTO: 7.86 THOUSANDS/ΜL (ref 1.85–7.62)
NEUTS SEG NFR BLD AUTO: 75 % (ref 43–75)
NRBC BLD AUTO-RTO: 0 /100 WBCS
PLATELET # BLD AUTO: 576 THOUSANDS/UL (ref 149–390)
PMV BLD AUTO: 9.8 FL (ref 8.9–12.7)
POTASSIUM SERPL-SCNC: 3.6 MMOL/L (ref 3.5–5.3)
RBC # BLD AUTO: 3.02 MILLION/UL (ref 3.88–5.62)
SODIUM SERPL-SCNC: 137 MMOL/L (ref 136–145)
WBC # BLD AUTO: 10.57 THOUSAND/UL (ref 4.31–10.16)

## 2019-06-29 PROCEDURE — 80048 BASIC METABOLIC PNL TOTAL CA: CPT | Performed by: NURSE PRACTITIONER

## 2019-06-29 PROCEDURE — C9113 INJ PANTOPRAZOLE SODIUM, VIA: HCPCS | Performed by: INTERNAL MEDICINE

## 2019-06-29 PROCEDURE — 85025 COMPLETE CBC W/AUTO DIFF WBC: CPT | Performed by: PHYSICIAN ASSISTANT

## 2019-06-29 PROCEDURE — 83735 ASSAY OF MAGNESIUM: CPT | Performed by: PHYSICIAN ASSISTANT

## 2019-06-29 PROCEDURE — 99232 SBSQ HOSP IP/OBS MODERATE 35: CPT | Performed by: NURSE PRACTITIONER

## 2019-06-29 RX ADMIN — PANTOPRAZOLE SODIUM 40 MG: 40 INJECTION, POWDER, FOR SOLUTION INTRAVENOUS at 08:56

## 2019-06-29 RX ADMIN — LIDOCAINE 4%: 4 CREAM TOPICAL at 02:37

## 2019-06-29 RX ADMIN — CEFTRIAXONE 1000 MG: 1 INJECTION, POWDER, FOR SOLUTION INTRAMUSCULAR; INTRAVENOUS at 09:09

## 2019-06-29 RX ADMIN — OXYBUTYNIN CHLORIDE 5 MG: 5 TABLET, EXTENDED RELEASE ORAL at 08:55

## 2019-06-29 RX ADMIN — GABAPENTIN 300 MG: 300 CAPSULE ORAL at 17:23

## 2019-06-29 RX ADMIN — GABAPENTIN 300 MG: 300 CAPSULE ORAL at 08:56

## 2019-06-29 RX ADMIN — SODIUM CHLORIDE 75 ML/HR: 0.45 INJECTION, SOLUTION INTRAVENOUS at 01:11

## 2019-06-29 RX ADMIN — Medication 250 MG: at 17:23

## 2019-06-29 RX ADMIN — ATORVASTATIN CALCIUM 40 MG: 40 TABLET, FILM COATED ORAL at 17:23

## 2019-06-29 RX ADMIN — METOPROLOL TARTRATE 25 MG: 25 TABLET, FILM COATED ORAL at 08:55

## 2019-06-29 RX ADMIN — DOCUSATE SODIUM 100 MG: 100 CAPSULE, LIQUID FILLED ORAL at 17:23

## 2019-06-29 RX ADMIN — PREDNISONE 2.5 MG: 2.5 TABLET ORAL at 08:58

## 2019-06-29 RX ADMIN — SODIUM CHLORIDE 75 ML/HR: 0.45 INJECTION, SOLUTION INTRAVENOUS at 14:23

## 2019-06-29 RX ADMIN — DOCUSATE SODIUM 100 MG: 100 CAPSULE, LIQUID FILLED ORAL at 08:56

## 2019-06-29 RX ADMIN — PANTOPRAZOLE SODIUM 40 MG: 40 INJECTION, POWDER, FOR SOLUTION INTRAVENOUS at 21:16

## 2019-06-29 RX ADMIN — Medication 250 MG: at 08:58

## 2019-06-29 RX ADMIN — FERROUS SULFATE TAB 325 MG (65 MG ELEMENTAL FE) 325 MG: 325 (65 FE) TAB at 08:56

## 2019-06-29 RX ADMIN — METOPROLOL TARTRATE 25 MG: 25 TABLET, FILM COATED ORAL at 17:23

## 2019-06-29 RX ADMIN — SERTRALINE HYDROCHLORIDE 50 MG: 50 TABLET ORAL at 08:56

## 2019-06-29 RX ADMIN — Medication 250 MG: at 08:55

## 2019-06-29 NOTE — ASSESSMENT & PLAN NOTE
· Afebrile past 24 hours  · Has been noted to have fluctuation in temperatures mainly at HS  · Blood cultures negative  · Urine culture positive Klebsiella pneumoniae  · Follow-up urine culture and sensitivities  · Continue current antibiotic

## 2019-06-29 NOTE — ASSESSMENT & PLAN NOTE
· In the setting of GI bleed as evidenced by bright red blood per rectum and hemoglobin of 6 4 on admission    · It is suspected this is a diverticular bleed and patient was on Lovenox status post hip repair  · Received 3 units of packed RBCs this admission  · Today's hemoglobin stable at 8 7 (8 3 yesterday)  · Per nursing no blood per rectum noted last stool 2 days ago and was black and tarry  · Continue to monitor

## 2019-06-29 NOTE — ASSESSMENT & PLAN NOTE
· Present on admission with creatinine noted to be 1 90 with baseline creatinine noted to be between 1 2-1 4    · Creatinine improved currently 0 92  · Continue gentle IV hydration, encourage oral intake   · Avoid nephrotoxic agents and hypotension  · Monitor BMP

## 2019-06-29 NOTE — ASSESSMENT & PLAN NOTE
· Palliative care meeting with patient's wife, daughter and son occurred Friday Please refer to palliative Medicine note for details  Family would like to give patient until Monday to see if he has a bowel movement and if there is any evidence of bleeding noted  They are leaning towards hospice but have yet to make a full decision  He remains level 3 DNR

## 2019-06-29 NOTE — ASSESSMENT & PLAN NOTE
· Patient failed retention protocol and Sepulveda was inserted  · After placement of Sepulveda urine was thick creamy white and patient had temperature, culture was obtained currently positive for Klebsiella pneumoniae  · Continue current antibiotic  · Appreciate urology consultation, recommendations noted  · Maintain Sepulveda for now if patient decides on hospice will discharge with Sepulveda intact otherwise will attempt voiding trial as per Urology recommendations between day 10-14 after Sepulveda catheter placement    · Continued ditropan

## 2019-06-29 NOTE — PROGRESS NOTES
Progress Note - Julissa Zaragoza 6/27/1927, 80 y o  male MRN: [id number]    Unit/Bed#: CW2 213-01 Encounter: [id number]    Primary Care Provider: Sara Lilly DO   Date and time admitted to hospital: 6/23/2019 11:40 AM        * GI bleed  Assessment & Plan  · Secondary to suspected diverticular bleed and the use of lovenox post hip repair 5/2019,   · Presented with bright red blood per rectum followed by melena as documented in medical record  · He denies nausea vomiting abdominal pain  · Personally observed large tarry black stool 3 days ago which was his last documented stool  · No further stools noted since no blood per rectum  · At the request of patient's daughter will continue bowel regimen, she would like him to have a bowel movement prior to being discharged to assess if he has any further bleeding  · CT of the abdomen and pelvis with mild acute uncomplicated sigmoid diverticulitis, cholelithiasis, renal cyst  · Increased to regular diet as tolerated  · Discussed with GI about black tarry stool in concerns for continue blood loss  Patient's daughter reaffirmed that she does not want him to go through any aggressive workup  Did have discussion with her about goals of care, palliative medicine met with her and the patient Friday of which they are going to consider possible home with hospice  · Family OK with PRBC transfusion as needed at this time until they decide whether not to place him on hospice versus home with home therapies    Acute post-hemorrhagic anemia  Assessment & Plan  · In the setting of GI bleed as evidenced by bright red blood per rectum and hemoglobin of 6 4 on admission    · It is suspected this is a diverticular bleed and patient was on Lovenox status post hip repair  · Received 3 units of packed RBCs this admission  · Today's hemoglobin stable at 8 7 (8 3 yesterday)  · Per nursing no blood per rectum noted last stool 2 days ago and was black and tarry  · Continue to monitor    Goals of care, counseling/discussion  Assessment & Plan  · Palliative care meeting with patient's wife, daughter and son occurred Friday Please refer to palliative Medicine note for details  Family would like to give patient until Monday to see if he has a bowel movement and if there is any evidence of bleeding noted  They are leaning towards hospice but have yet to make a full decision  He remains level 3 DNR  Fever  Assessment & Plan  · Afebrile past 24 hours  · Has been noted to have fluctuation in temperatures mainly at HS  · Blood cultures negative  · Urine culture positive Klebsiella pneumoniae  · Follow-up urine culture and sensitivities  · Continue current antibiotic    Urinary retention  Assessment & Plan  · Patient failed retention protocol and Sepulveda was inserted  · After placement of Sepulveda urine was thick creamy white and patient had temperature, culture was obtained currently positive for Klebsiella pneumoniae  · Continue current antibiotic  · Appreciate urology consultation, recommendations noted  · Maintain Sepulveda for now if patient decides on hospice will discharge with Sepulveda intact otherwise will attempt voiding trial as per Urology recommendations between day 10-14 after Sepulveda catheter placement  · Continued ditropan    JENNYFER on CKD stage 3  Assessment & Plan  · Present on admission with creatinine noted to be 1 90 with baseline creatinine noted to be between 1 2-1 4    · Creatinine improved currently 0 92  · Continue gentle IV hydration, encourage oral intake   · Avoid nephrotoxic agents and hypotension  · Monitor BMP    Rheumatoid arthritis (HCC)  Assessment & Plan  · Continue Prednisone 2 5 mg daily    Dementia  Assessment & Plan  · Supportive care, continue with Zoloft daily  · PT/OT recommending STR on discharge, daughter with plans to take patient home, no plans for short-term rehab patient may likely go home with hospice family to decide within the next 50 hours    Hypertension  Assessment & Plan  · Blood pressure acceptable  · Continue Lopressor with holding parameters   · Monitor      VTE Pharmacologic Prophylaxis:   Pharmacologic: Pharmacologic VTE Prophylaxis contraindicated due to GIB  Mechanical VTE Prophylaxis in Place: Yes    Patient Centered Rounds: I have performed bedside rounds with nursing staff today  Discussions with Specialists or Other Care Team Provider:     Education and Discussions with Family / Patient: patient     Time Spent for Care: 30 minutes  More than 50% of total time spent on counseling and coordination of care as described above  Current Length of Stay: 5 day(s)    Current Patient Status: Inpatient   Certification Statement: The patient will continue to require additional inpatient hospital stay due to close monitoring of Hgb and stools for bleeding and family to decide on hospice versus [address]    Discharge Plan: home with [address] versus home with hospice likely Monday    Code Status: Level 3 - DNAR and DNI      Subjective:  Per nursing patient slept all night  Appetite fair asking for diet to be advanced  No N/V  No abd pain  No HA or SOB or CP  No BM    Objective:     Vitals:   Temp (24hrs), Av 7 °F (37 6 °C), Min:98 9 °F (37 2 °C), Max:100 3 °F (37 9 °C)    Temp:  [98 9 °F (37 2 °C)-100 3 °F (37 9 °C)] 98 9 °F (37 2 °C)  HR:  [64-78] 64  Resp:  [15-16] 15  BP: (146-180)/(58-73) 146/58  SpO2:  [98 %-100 %] 98 %  Body mass index is 22 21 kg/m²  Input and Output Summary (last 24 hours): Intake/Output Summary (Last 24 hours) at 2019 1701  Last data filed at 2019 1646  Gross per 24 hour   Intake 1000 ml   Output 1700 ml   Net -700 ml       Physical Exam:     Physical Exam   Constitutional: He is oriented to person, place, and time  He appears well-developed  No distress  Frail chronically ill appearing   HENT:   Head: Normocephalic and atraumatic  Mouth/Throat: Oropharynx is clear and moist    Neck: Neck supple  Cardiovascular: Normal rate  Murmur heard  Pulmonary/Chest: Effort normal and breath sounds normal  No respiratory distress  Abdominal: Soft  Bowel sounds are normal  He exhibits no distension  There is no tenderness  Musculoskeletal: Normal range of motion  He exhibits no edema  Neurological: He is alert and oriented to person, place, and time  forgetful    Skin: Skin is warm and dry  There is pallor  Psychiatric: He has a normal mood and affect  His behavior is normal    Vitals reviewed  Additional Data:     Labs:    Results from last 7 days   Lab Units 06/29/19  0449   WBC Thousand/uL 10 57*   HEMOGLOBIN g/dL 8 7*   HEMATOCRIT % 27 3*   PLATELETS Thousands/uL 576*   NEUTROS PCT % 75   LYMPHS PCT % 8*   MONOS PCT % 11   EOS PCT % 4     Results from last 7 days   Lab Units 06/29/19  0449 06/28/19  0628  06/23/19  1157   POTASSIUM mmol/L 3 6 3 3*   < >  --    CHLORIDE mmol/L 107 107   < >  --    CO2 mmol/L 23 25   < >  --    CO2, I-STAT mmol/L  --   --   --  22   BUN mg/dL 12 15   < >  --    CREATININE mg/dL 0 92 1 00   < >  --    CALCIUM mg/dL 8 3 8 2*   < >  --    ALK PHOS U/L  --  92   < >  --    ALT U/L  --  27   < >  --    AST U/L  --  23   < >  --    GLUCOSE, ISTAT mg/dl  --   --   --  157*    < > = values in this interval not displayed  * I Have Reviewed All Lab Data Listed Above  * Additional Pertinent Lab Tests Reviewed: All Labs Within Last 24 Hours Reviewed    Imaging:    Imaging Reports Reviewed Today Include: none  Imaging Personally Reviewed by Myself Includes:  none    Recent Cultures (last 7 days):     Results from last 7 days   Lab Units 06/27/19  1642 06/25/19  1835   BLOOD CULTURE   --  No Growth at 72 hrs  No Growth at 72 hrs     URINE CULTURE  >100,000 cfu/ml Klebsiella pneumoniae*  --        Last 24 Hours Medication List:     Current Facility-Administered Medications:  vitamin C 250 mg Oral Daily Ashley Araya PA-C    atorvastatin 40 mg Oral QPM Keiko Hoffman LEVON    cefTRIAXone 1,000 mg Intravenous Q24H MAZIN Loyd Last Rate: 1,000 mg (06/29/19 0909)   docusate sodium 100 mg Oral BID Calos Wright MD    ferrous sulfate 325 mg Oral Daily With Breakfast Calos Wright MD    gabapentin 300 mg Oral BID MAZIN Loyd    lidocaine  Topical 4x Daily PRN MAZIN Loyd    metoprolol tartrate 25 mg Oral BID Jamel Can PA-C    oxybutynin 5 mg Oral Daily Javon Quick MD    oxyCODONE 2 5 mg Oral Q4H PRN Jamel Can PA-C    pantoprazole 40 mg Intravenous Q12H [address] Calos Wright MD    polyethylene glycol 17 g Oral Daily PRN MAZIN Loyd    predniSONE 2 5 mg Oral Daily Calos Wright MD    saccharomyces boulardii 250 mg Oral BID Calos Wright MD    senna 1 tablet Oral HS Ashley Araya PA-C    sertraline 50 mg Oral Daily Calos Wright MD    sodium chloride 75 mL/hr Intravenous Continuous Jamel Can PA-C Last Rate: 75 mL/hr (06/29/19 1423)        Today, Patient Was Seen By: MAZIN Loyd    ** Please Note: Dictation voice to text software may have been used in the creation of this document   **

## 2019-06-29 NOTE — ASSESSMENT & PLAN NOTE
· Secondary to suspected diverticular bleed and the use of lovenox post hip repair 5/2019,   · Presented with bright red blood per rectum followed by melena as documented in medical record  · He denies nausea vomiting abdominal pain  · Personally observed large tarry black stool 3 days ago which was his last documented stool  · No further stools noted since no blood per rectum  · At the request of patient's daughter will continue bowel regimen, she would like him to have a bowel movement prior to being discharged to assess if he has any further bleeding  · CT of the abdomen and pelvis with mild acute uncomplicated sigmoid diverticulitis, cholelithiasis, renal cyst  · Increased to regular diet as tolerated  · Discussed with GI about black tarry stool in concerns for continue blood loss  Patient's daughter reaffirmed that she does not want him to go through any aggressive workup  Did have discussion with her about goals of care, palliative medicine met with her and the patient Friday of which they are going to consider possible home with hospice       · Family OK with PRBC transfusion as needed at this time until they decide whether not to place him on hospice versus home with home therapies

## 2019-06-30 PROBLEM — N39.0 UTI (URINARY TRACT INFECTION): Status: ACTIVE | Noted: 2019-06-30

## 2019-06-30 LAB
ANION GAP SERPL CALCULATED.3IONS-SCNC: 7 MMOL/L (ref 4–13)
BACTERIA BLD CULT: NORMAL
BACTERIA BLD CULT: NORMAL
BASOPHILS # BLD AUTO: 0.11 THOUSANDS/ΜL (ref 0–0.1)
BASOPHILS NFR BLD AUTO: 1 % (ref 0–1)
BUN SERPL-MCNC: 10 MG/DL (ref 5–25)
CALCIUM SERPL-MCNC: 8.3 MG/DL (ref 8.3–10.1)
CHLORIDE SERPL-SCNC: 110 MMOL/L (ref 100–108)
CO2 SERPL-SCNC: 22 MMOL/L (ref 21–32)
CREAT SERPL-MCNC: 0.81 MG/DL (ref 0.6–1.3)
EOSINOPHIL # BLD AUTO: 0.44 THOUSAND/ΜL (ref 0–0.61)
EOSINOPHIL NFR BLD AUTO: 6 % (ref 0–6)
ERYTHROCYTE [DISTWIDTH] IN BLOOD BY AUTOMATED COUNT: 15.9 % (ref 11.6–15.1)
GFR SERPL CREATININE-BSD FRML MDRD: 77 ML/MIN/1.73SQ M
GLUCOSE SERPL-MCNC: 83 MG/DL (ref 65–140)
HCT VFR BLD AUTO: 26.7 % (ref 36.5–49.3)
HGB BLD-MCNC: 8.2 G/DL (ref 12–17)
IMM GRANULOCYTES # BLD AUTO: 0.05 THOUSAND/UL (ref 0–0.2)
IMM GRANULOCYTES NFR BLD AUTO: 1 % (ref 0–2)
LYMPHOCYTES # BLD AUTO: 1.06 THOUSANDS/ΜL (ref 0.6–4.47)
LYMPHOCYTES NFR BLD AUTO: 13 % (ref 14–44)
MCH RBC QN AUTO: 27.7 PG (ref 26.8–34.3)
MCHC RBC AUTO-ENTMCNC: 30.7 G/DL (ref 31.4–37.4)
MCV RBC AUTO: 90 FL (ref 82–98)
MONOCYTES # BLD AUTO: 0.83 THOUSAND/ΜL (ref 0.17–1.22)
MONOCYTES NFR BLD AUTO: 10 % (ref 4–12)
NEUTROPHILS # BLD AUTO: 5.51 THOUSANDS/ΜL (ref 1.85–7.62)
NEUTS SEG NFR BLD AUTO: 69 % (ref 43–75)
NRBC BLD AUTO-RTO: 0 /100 WBCS
PLATELET # BLD AUTO: 555 THOUSANDS/UL (ref 149–390)
PMV BLD AUTO: 9.7 FL (ref 8.9–12.7)
POTASSIUM SERPL-SCNC: 3.4 MMOL/L (ref 3.5–5.3)
RBC # BLD AUTO: 2.96 MILLION/UL (ref 3.88–5.62)
SODIUM SERPL-SCNC: 139 MMOL/L (ref 136–145)
WBC # BLD AUTO: 8 THOUSAND/UL (ref 4.31–10.16)

## 2019-06-30 PROCEDURE — 85025 COMPLETE CBC W/AUTO DIFF WBC: CPT | Performed by: NURSE PRACTITIONER

## 2019-06-30 PROCEDURE — 99233 SBSQ HOSP IP/OBS HIGH 50: CPT | Performed by: NURSE PRACTITIONER

## 2019-06-30 PROCEDURE — C9113 INJ PANTOPRAZOLE SODIUM, VIA: HCPCS | Performed by: INTERNAL MEDICINE

## 2019-06-30 PROCEDURE — 80048 BASIC METABOLIC PNL TOTAL CA: CPT | Performed by: NURSE PRACTITIONER

## 2019-06-30 RX ADMIN — GABAPENTIN 300 MG: 300 CAPSULE ORAL at 08:49

## 2019-06-30 RX ADMIN — SENNOSIDES 8.6 MG: 8.6 TABLET, FILM COATED ORAL at 21:15

## 2019-06-30 RX ADMIN — CEFTRIAXONE 1000 MG: 1 INJECTION, POWDER, FOR SOLUTION INTRAMUSCULAR; INTRAVENOUS at 08:50

## 2019-06-30 RX ADMIN — SERTRALINE HYDROCHLORIDE 50 MG: 50 TABLET ORAL at 08:49

## 2019-06-30 RX ADMIN — DOCUSATE SODIUM 100 MG: 100 CAPSULE, LIQUID FILLED ORAL at 08:49

## 2019-06-30 RX ADMIN — Medication 250 MG: at 08:49

## 2019-06-30 RX ADMIN — OXYBUTYNIN CHLORIDE 5 MG: 5 TABLET, EXTENDED RELEASE ORAL at 08:49

## 2019-06-30 RX ADMIN — SENNOSIDES 8.6 MG: 8.6 TABLET, FILM COATED ORAL at 00:36

## 2019-06-30 RX ADMIN — PANTOPRAZOLE SODIUM 40 MG: 40 INJECTION, POWDER, FOR SOLUTION INTRAVENOUS at 21:15

## 2019-06-30 RX ADMIN — METOPROLOL TARTRATE 25 MG: 25 TABLET, FILM COATED ORAL at 17:41

## 2019-06-30 RX ADMIN — METOPROLOL TARTRATE 25 MG: 25 TABLET, FILM COATED ORAL at 08:49

## 2019-06-30 RX ADMIN — ATORVASTATIN CALCIUM 40 MG: 40 TABLET, FILM COATED ORAL at 17:41

## 2019-06-30 RX ADMIN — SODIUM CHLORIDE 75 ML/HR: 0.45 INJECTION, SOLUTION INTRAVENOUS at 04:25

## 2019-06-30 RX ADMIN — GABAPENTIN 300 MG: 300 CAPSULE ORAL at 17:41

## 2019-06-30 RX ADMIN — FERROUS SULFATE TAB 325 MG (65 MG ELEMENTAL FE) 325 MG: 325 (65 FE) TAB at 08:49

## 2019-06-30 RX ADMIN — PREDNISONE 2.5 MG: 2.5 TABLET ORAL at 08:49

## 2019-06-30 RX ADMIN — Medication 250 MG: at 17:41

## 2019-06-30 RX ADMIN — MAGNESIUM HYDROXIDE 30 ML: 400 SUSPENSION ORAL at 08:50

## 2019-06-30 RX ADMIN — PANTOPRAZOLE SODIUM 40 MG: 40 INJECTION, POWDER, FOR SOLUTION INTRAVENOUS at 08:49

## 2019-06-30 NOTE — ASSESSMENT & PLAN NOTE
· T max 100 0, past 24 hours  · Blood cultures negative  · Urine culture positive Klebsiella pneumoniae  · Sensitive to current antibiotic will continue if patient remains afebrile suggest changing to oral antibiotics in a m

## 2019-06-30 NOTE — ASSESSMENT & PLAN NOTE
· Palliative care meeting with patient's wife, daughter and son occurred Friday Please refer to palliative Medicine note for details  Family would like to give patient until Monday to see if he has any further blood per rectum and if hemoglobin continues to decrease  As of Friday they were leaning towards hospice but have yet to make a full decision  He remains level 3 DNR

## 2019-06-30 NOTE — ASSESSMENT & PLAN NOTE
· Present on admission with creatinine noted to be 1 90 with baseline creatinine noted to be between 1 2-1 4    · Creatinine improved currently 0 81  · Discontinue IV fluids encourage oral intake  · Avoid nephrotoxic agents and hypotension  · Monitor BMP

## 2019-06-30 NOTE — PROGRESS NOTES
Progress Note - Alvino Bachelor 6/27/1927, 80 y o  male MRN: [id number]    Unit/Bed#: CW2 213-01 Encounter: [id number]    Primary Care Provider: Adele Sorto DO   Date and time admitted to hospital: 6/23/2019 11:40 AM        * GI bleed  Assessment & Plan  · Secondary to suspected diverticular bleed and the use of lovenox post hip repair 5/2019,   · Presented with bright red blood per rectum followed by melena as documented in medical record  · He denies nausea vomiting abdominal pain  · Today with dark brown stool paste like did not notice any blood although it was quite dark may have undetected blood  · Hemoglobin remained stable  · Continue bowel regimen at daughter's request  · CT of the abdomen and pelvis with mild acute uncomplicated sigmoid diverticulitis, cholelithiasis, renal cyst  · Continue diet as tolerated  · Check CBC in a m  To ensure stability  If CBC remained stable and patient does not have any stools that have alexey blood it is likely that he will be ready for discharge either Monday or Tuesday  Family to decide whether taken home with home health care services versus home with hospice    UTI (urinary tract infection)  Assessment & Plan  · Sepulveda catheter placed due to urinary retention and failing retention protocol  When Sepulveda was placed thick milky like urine was noted  Patient had spiked temperature prior to the Sepulveda being placed and after  Urinalysis was abnormal and urine culture was positive for Klebsiella pneumoniae  · Continue current antibiotic  · Appreciate Urology consultation recommendations noted    Acute post-hemorrhagic anemia  Assessment & Plan  · In the setting of GI bleed as evidenced by bright red blood per rectum and hemoglobin of 6 4 on admission    · It is suspected this is a diverticular bleed and patient was on Lovenox status post hip repair  · Received 3 units of packed RBCs this admission  · Today's hemoglobin stable at 8 0 (8 7 yesterday)  · Had dark brown stool today, paste like  · Continue to monitor    Goals of care, counseling/discussion  Assessment & Plan  · Palliative care meeting with patient's wife, daughter and son occurred Friday Please refer to palliative Medicine note for details  Family would like to give patient until Monday to see if he has any further blood per rectum and if hemoglobin continues to decrease  As of Friday they were leaning towards hospice but have yet to make a full decision  He remains level 3 DNR  Fever  Assessment & Plan  · T max 100 0, past 24 hours  · Blood cultures negative  · Urine culture positive Klebsiella pneumoniae  · Sensitive to current antibiotic will continue if patient remains afebrile suggest changing to oral antibiotics in a m  Urinary retention  Assessment & Plan  · Patient failed retention protocol and Sepulveda was inserted  · After placement of Sepulveda urine was thick creamy white and patient had temperature, culture was obtained currently positive for Klebsiella pneumoniae  · Continue current antibiotic  · Appreciate urology consultation, recommendations noted  · Maintain Sepulveda for now if patient decides on hospice will discharge with Sepulveda intact otherwise will attempt voiding trial as per Urology recommendations between day 10-14 after Sepulveda catheter placement  · Continued ditropan    JENNYFER on CKD stage 3  Assessment & Plan  · Present on admission with creatinine noted to be 1 90 with baseline creatinine noted to be between 1 2-1 4    · Creatinine improved currently 0 81  · Discontinue IV fluids encourage oral intake  · Avoid nephrotoxic agents and hypotension  · Monitor BMP    Rheumatoid arthritis (HCC)  Assessment & Plan  · Continue Prednisone 2 5 mg daily    Dementia  Assessment & Plan  · Supportive care, continue with Zoloft daily  · PT/OT recommending STR on discharge, daughter with plans to take patient home, no plans for short-term rehab patient may likely go home with hospice family to decide within the next 48 hours    Hypertension  Assessment & Plan  · Blood pressure acceptable  · Continue Lopressor with holding parameters   · Monitor    VTE Pharmacologic Prophylaxis:   Pharmacologic: Pharmacologic VTE Prophylaxis contraindicated due to GI bleed  Mechanical VTE Prophylaxis in Place: Yes    Patient Centered Rounds: I have performed bedside rounds with nursing staff today  Discussions with Specialists or Other Care Team Provider:     Education and Discussions with Family / Patient:  Called daughter left voicemail    Time Spent for Care: 30 minutes  More than 50% of total time spent on counseling and coordination of care as described above  Current Length of Stay: 6 day(s)    Current Patient Status: Inpatient   Certification Statement: The patient will continue to require additional inpatient hospital stay due to Need for safe discharge plan, will be discharged home likely in a m  If hemoglobin remained stable    Discharge Plan:  Likely home tomorrow if hemoglobin remained stable and family prepared to take patient home    Code Status: Level 3 - DNAR and DNI      Subjective:   Denies pain, no nausea or vomiting  Appetite is poor  Had dark brown stool with no obvious evidence of blood today  Pain in lower extremities controlled  Sepulveda pain for rudy urine    Objective:     Vitals:   Temp (24hrs), Av 1 °F (36 7 °C), Min:97 8 °F (36 6 °C), Max:98 3 °F (36 8 °C)    Temp:  [97 8 °F (36 6 °C)-98 3 °F (36 8 °C)] 98 3 °F (36 8 °C)  HR:  [69-71] 69  Resp:  [16] 16  BP: (123-149)/(57-68) 123/57  SpO2:  [98 %-99 %] 98 %  Body mass index is 23 17 kg/m²  Input and Output Summary (last 24 hours): Intake/Output Summary (Last 24 hours) at 2019 1647  Last data filed at 2019 0730  Gross per 24 hour   Intake 1120 ml   Output 1300 ml   Net -180 ml       Physical Exam:     Physical Exam   Constitutional: He appears well-developed  No distress     Cachectic, frail-appearing   HENT:   Head: Normocephalic  Mouth/Throat: Oropharynx is clear and moist    Neck: Neck supple  Cardiovascular: Normal rate  Murmur heard  Pulmonary/Chest: Effort normal and breath sounds normal  No respiratory distress  Abdominal: Soft  Bowel sounds are normal  He exhibits no distension  There is no tenderness  Musculoskeletal: Normal range of motion  He exhibits no edema  Neurological: He is alert  Drowsy easily aroused to verbal stimuli oriented to person and place with periods of forgetfulness   Skin: Skin is warm and dry  Skin pale dry flaky   Psychiatric: His behavior is normal    Affect flat   Vitals reviewed  Additional Data:     Labs:    Results from last 7 days   Lab Units 06/30/19  0504   WBC Thousand/uL 8 00   HEMOGLOBIN g/dL 8 2*   HEMATOCRIT % 26 7*   PLATELETS Thousands/uL 555*   NEUTROS PCT % 69   LYMPHS PCT % 13*   MONOS PCT % 10   EOS PCT % 6     Results from last 7 days   Lab Units 06/30/19  0504  06/28/19  0628   POTASSIUM mmol/L 3 4*   < > 3 3*   CHLORIDE mmol/L 110*   < > 107   CO2 mmol/L 22   < > 25   BUN mg/dL 10   < > 15   CREATININE mg/dL 0 81   < > 1 00   CALCIUM mg/dL 8 3   < > 8 2*   ALK PHOS U/L  --   --  92   ALT U/L  --   --  27   AST U/L  --   --  23    < > = values in this interval not displayed  * I Have Reviewed All Lab Data Listed Above  * Additional Pertinent Lab Tests Reviewed: All Labs Within Last 24 Hours Reviewed    Imaging:    Imaging Reports Reviewed Today Include:  None  Imaging Personally Reviewed by Myself Includes:  None    Recent Cultures (last 7 days):     Results from last 7 days   Lab Units 06/27/19  1642 06/25/19  1835   BLOOD CULTURE   --  No Growth After 4 Days  No Growth After 4 Days     URINE CULTURE  >100,000 cfu/ml - STRAIN 1 Klebsiella pneumoniae*  >100,000 cfu/ml - STRAIN 2 Klebsiella pneumoniae*  --        Last 24 Hours Medication List:     Current Facility-Administered Medications:  vitamin C 250 mg Oral Daily Juana Medina PA-C atorvastatin 40 mg Oral QPM Ashley Araya PA-C    cefTRIAXone 1,000 mg Intravenous Q24H MAZIN Loyd Last Rate: 1,000 mg (06/30/19 0850)   docusate sodium 100 mg Oral BID Calos Wright MD    ferrous sulfate 325 mg Oral Daily With Breakfast Calos Wright MD    gabapentin 300 mg Oral BID MAZIN Loyd    lidocaine  Topical 4x Daily PRN MAZIN Loyd    metoprolol tartrate 25 mg Oral BID Jamel Can PA-C    oxybutynin 5 mg Oral Daily Javon Quick MD    oxyCODONE 2 5 mg Oral Q4H PRN Jamel Can PA-C    pantoprazole 40 mg Intravenous Q12H [address] Calos Wright MD    polyethylene glycol 17 g Oral Daily PRN MAZIN Loyd    predniSONE 2 5 mg Oral Daily Calos Wright MD    saccharomyces boulardii 250 mg Oral BID Calos Wright MD    senna 1 tablet Oral HS Ashley Araya PA-C    sertraline 50 mg Oral Daily Calos Wright MD         Today, Patient Was Seen By: MAZIN Loyd    ** Please Note: Dictation voice to text software may have been used in the creation of this document   **

## 2019-06-30 NOTE — ASSESSMENT & PLAN NOTE
· Secondary to suspected diverticular bleed and the use of lovenox post hip repair 5/2019,   · Presented with bright red blood per rectum followed by melena as documented in medical record  · He denies nausea vomiting abdominal pain  · Today with dark brown stool paste like did not notice any blood although it was quite dark may have undetected blood  · Hemoglobin remained stable  · Continue bowel regimen at daughter's request  · CT of the abdomen and pelvis with mild acute uncomplicated sigmoid diverticulitis, cholelithiasis, renal cyst  · Continue diet as tolerated  · Check CBC in a m  To ensure stability  If CBC remained stable and patient does not have any stools that have alexey blood it is likely that he will be ready for discharge either Monday or Tuesday    Family to decide whether taken home with home health care services versus home with hospice

## 2019-06-30 NOTE — ASSESSMENT & PLAN NOTE
· In the setting of GI bleed as evidenced by bright red blood per rectum and hemoglobin of 6 4 on admission    · It is suspected this is a diverticular bleed and patient was on Lovenox status post hip repair  · Received 3 units of packed RBCs this admission  · Today's hemoglobin stable at 8 0 (8 7 yesterday)  · Had dark brown stool today, paste like  · Continue to monitor

## 2019-06-30 NOTE — ASSESSMENT & PLAN NOTE
· Sepulveda catheter placed due to urinary retention and failing retention protocol  When Sepulveda was placed thick milky like urine was noted  Patient had spiked temperature prior to the Sepulveda being placed and after    Urinalysis was abnormal and urine culture was positive for Klebsiella pneumoniae  · Continue current antibiotic  · Appreciate Urology consultation recommendations noted

## 2019-07-01 PROBLEM — N18.30 CKD (CHRONIC KIDNEY DISEASE) STAGE 3, GFR 30-59 ML/MIN (HCC): Status: RESOLVED | Noted: 2019-06-23 | Resolved: 2019-07-01

## 2019-07-01 PROBLEM — I49.1 PAC (PREMATURE ATRIAL CONTRACTION): Status: ACTIVE | Noted: 2019-07-01

## 2019-07-01 PROBLEM — R50.9 FEVER: Status: RESOLVED | Noted: 2019-06-26 | Resolved: 2019-07-01

## 2019-07-01 LAB
ATRIAL RATE: 60 BPM
BASOPHILS # BLD AUTO: 0.14 THOUSANDS/ΜL (ref 0–0.1)
BASOPHILS NFR BLD AUTO: 2 % (ref 0–1)
EOSINOPHIL # BLD AUTO: 0.56 THOUSAND/ΜL (ref 0–0.61)
EOSINOPHIL NFR BLD AUTO: 6 % (ref 0–6)
ERYTHROCYTE [DISTWIDTH] IN BLOOD BY AUTOMATED COUNT: 15.7 % (ref 11.6–15.1)
HCT VFR BLD AUTO: 30 % (ref 36.5–49.3)
HGB BLD-MCNC: 9.1 G/DL (ref 12–17)
IMM GRANULOCYTES # BLD AUTO: 0.07 THOUSAND/UL (ref 0–0.2)
IMM GRANULOCYTES NFR BLD AUTO: 1 % (ref 0–2)
LYMPHOCYTES # BLD AUTO: 1.14 THOUSANDS/ΜL (ref 0.6–4.47)
LYMPHOCYTES NFR BLD AUTO: 13 % (ref 14–44)
MCH RBC QN AUTO: 27.7 PG (ref 26.8–34.3)
MCHC RBC AUTO-ENTMCNC: 30.3 G/DL (ref 31.4–37.4)
MCV RBC AUTO: 92 FL (ref 82–98)
MONOCYTES # BLD AUTO: 0.86 THOUSAND/ΜL (ref 0.17–1.22)
MONOCYTES NFR BLD AUTO: 10 % (ref 4–12)
NEUTROPHILS # BLD AUTO: 6.01 THOUSANDS/ΜL (ref 1.85–7.62)
NEUTS SEG NFR BLD AUTO: 68 % (ref 43–75)
NRBC BLD AUTO-RTO: 0 /100 WBCS
P AXIS: 62 DEGREES
PLATELET # BLD AUTO: 642 THOUSANDS/UL (ref 149–390)
PMV BLD AUTO: 9.8 FL (ref 8.9–12.7)
PR INTERVAL: 190 MS
QRS AXIS: -29 DEGREES
QRSD INTERVAL: 80 MS
QT INTERVAL: 412 MS
QTC INTERVAL: 441 MS
RBC # BLD AUTO: 3.28 MILLION/UL (ref 3.88–5.62)
T WAVE AXIS: 30 DEGREES
VENTRICULAR RATE: 69 BPM
WBC # BLD AUTO: 8.78 THOUSAND/UL (ref 4.31–10.16)

## 2019-07-01 PROCEDURE — 99233 SBSQ HOSP IP/OBS HIGH 50: CPT | Performed by: PHYSICIAN ASSISTANT

## 2019-07-01 PROCEDURE — 85025 COMPLETE CBC W/AUTO DIFF WBC: CPT | Performed by: NURSE PRACTITIONER

## 2019-07-01 PROCEDURE — 93010 ELECTROCARDIOGRAM REPORT: CPT | Performed by: INTERNAL MEDICINE

## 2019-07-01 PROCEDURE — 99232 SBSQ HOSP IP/OBS MODERATE 35: CPT | Performed by: NURSE PRACTITIONER

## 2019-07-01 PROCEDURE — C9113 INJ PANTOPRAZOLE SODIUM, VIA: HCPCS | Performed by: INTERNAL MEDICINE

## 2019-07-01 PROCEDURE — 93005 ELECTROCARDIOGRAM TRACING: CPT

## 2019-07-01 RX ORDER — AMLODIPINE BESYLATE 5 MG/1
5 TABLET ORAL DAILY
Status: DISCONTINUED | OUTPATIENT
Start: 2019-07-02 | End: 2019-07-02 | Stop reason: HOSPADM

## 2019-07-01 RX ORDER — CEPHALEXIN 500 MG/1
500 CAPSULE ORAL EVERY 12 HOURS SCHEDULED
Status: DISCONTINUED | OUTPATIENT
Start: 2019-07-01 | End: 2019-07-02 | Stop reason: HOSPADM

## 2019-07-01 RX ORDER — POTASSIUM CHLORIDE 20 MEQ/1
40 TABLET, EXTENDED RELEASE ORAL ONCE
Status: COMPLETED | OUTPATIENT
Start: 2019-07-01 | End: 2019-07-01

## 2019-07-01 RX ADMIN — FERROUS SULFATE TAB 325 MG (65 MG ELEMENTAL FE) 325 MG: 325 (65 FE) TAB at 08:38

## 2019-07-01 RX ADMIN — DOCUSATE SODIUM 100 MG: 100 CAPSULE, LIQUID FILLED ORAL at 08:38

## 2019-07-01 RX ADMIN — PREDNISONE 2.5 MG: 2.5 TABLET ORAL at 08:38

## 2019-07-01 RX ADMIN — CEFTRIAXONE 1000 MG: 1 INJECTION, POWDER, FOR SOLUTION INTRAMUSCULAR; INTRAVENOUS at 08:38

## 2019-07-01 RX ADMIN — Medication 250 MG: at 08:38

## 2019-07-01 RX ADMIN — GABAPENTIN 300 MG: 300 CAPSULE ORAL at 17:50

## 2019-07-01 RX ADMIN — METOPROLOL TARTRATE 25 MG: 25 TABLET, FILM COATED ORAL at 08:38

## 2019-07-01 RX ADMIN — CEPHALEXIN 500 MG: 500 CAPSULE ORAL at 22:33

## 2019-07-01 RX ADMIN — ATORVASTATIN CALCIUM 40 MG: 40 TABLET, FILM COATED ORAL at 17:50

## 2019-07-01 RX ADMIN — DOCUSATE SODIUM 100 MG: 100 CAPSULE, LIQUID FILLED ORAL at 17:50

## 2019-07-01 RX ADMIN — CEPHALEXIN 500 MG: 500 CAPSULE ORAL at 13:04

## 2019-07-01 RX ADMIN — SENNOSIDES 8.6 MG: 8.6 TABLET, FILM COATED ORAL at 22:32

## 2019-07-01 RX ADMIN — OXYBUTYNIN CHLORIDE 5 MG: 5 TABLET, EXTENDED RELEASE ORAL at 08:38

## 2019-07-01 RX ADMIN — PANTOPRAZOLE SODIUM 40 MG: 40 INJECTION, POWDER, FOR SOLUTION INTRAVENOUS at 08:56

## 2019-07-01 RX ADMIN — SERTRALINE HYDROCHLORIDE 50 MG: 50 TABLET ORAL at 08:56

## 2019-07-01 RX ADMIN — PANTOPRAZOLE SODIUM 40 MG: 40 INJECTION, POWDER, FOR SOLUTION INTRAVENOUS at 22:33

## 2019-07-01 RX ADMIN — METOPROLOL TARTRATE 25 MG: 25 TABLET, FILM COATED ORAL at 17:50

## 2019-07-01 RX ADMIN — Medication 250 MG: at 17:50

## 2019-07-01 RX ADMIN — POTASSIUM CHLORIDE 40 MEQ: 1500 TABLET, EXTENDED RELEASE ORAL at 13:05

## 2019-07-01 RX ADMIN — GABAPENTIN 300 MG: 300 CAPSULE ORAL at 08:38

## 2019-07-01 NOTE — SOCIAL WORK
Received telephone call from [address] pt not appropriate for hospice, but appropriate for Homecare  Palliative to see to explain process to family  [phone]  Requested script for hospital bed  As per Janice/daughter  Await same to fax to [employer]  Pt will need Ambulance transport to home  D/C plan; [facility]  Janice aware and agrees with plan  [id number]   Script received, faxed to [facility] & uploaded to [employer]  CMN completed  [id number]  Additional clinical documentation faxed to Maria Esther

## 2019-07-01 NOTE — PLAN OF CARE
Problem: Potential for Falls  Goal: Patient will remain free of falls  Description  INTERVENTIONS:  - Assess patient frequently for physical needs  -  Identify cognitive and physical deficits and behaviors that affect risk of falls    -  [place] fall precautions as indicated by assessment   - Educate patient/family on patient safety including physical limitations  - Instruct patient to call for assistance with activity based on assessment  - Modify environment to reduce risk of injury  - Consider OT/PT consult to assist with strengthening/mobility  Outcome: Progressing     Problem: Prexisting or High Potential for Compromised Skin Integrity  Goal: Skin integrity is maintained or improved  Description  INTERVENTIONS:  - Identify patients at risk for skin breakdown  - Assess and monitor skin integrity  - Assess and monitor nutrition and hydration status  - Monitor labs (i e  albumin)  - Assess for incontinence   - Turn and reposition patient  - Assist with mobility/ambulation  - Relieve pressure over bony prominences  - Avoid friction and shearing  - Provide appropriate hygiene as needed including keeping skin clean and dry  - Evaluate need for skin moisturizer/barrier cream  - Collaborate with interdisciplinary team (i e  Nutrition, Rehabilitation, etc )   - Patient/family teaching  Outcome: Progressing     Problem: GASTROINTESTINAL - ADULT  Goal: Minimal or absence of nausea and/or vomiting  Description  INTERVENTIONS:  - Administer IV fluids as ordered to ensure adequate hydration  - Maintain NPO status until nausea and vomiting are resolved  - Nasogastric tube as ordered  - Administer ordered antiemetic medications as needed  - Provide nonpharmacologic comfort measures as appropriate  - Advance diet as tolerated, if ordered  - Nutrition services referral to assist patient with adequate nutrition and appropriate food choices  Outcome: Progressing  Goal: Maintains or returns to baseline bowel function  Description  INTERVENTIONS:  - Assess bowel function  - Encourage oral fluids to ensure adequate hydration  - Administer IV fluids as ordered to ensure adequate hydration  - Administer ordered medications as needed  - Encourage mobilization and activity  - Nutrition services referral to assist patient with appropriate food choices  Outcome: Progressing  Goal: Maintains adequate nutritional intake  Description  INTERVENTIONS:  - Monitor percentage of each meal consumed  - Identify factors contributing to decreased intake, treat as appropriate  - Assist with meals as needed  - Monitor I&O, WT and lab values  - Obtain nutrition services referral as needed  Outcome: Progressing  Goal: Establish and maintain optimal ostomy function  Description  INTERVENTIONS:  - Assess bowel function  - Encourage oral fluids to ensure adequate hydration  - Administer IV fluids as ordered to ensure adequate hydration  - Administer ordered medications as needed  - Encourage mobilization and activity  - Nutrition services referral to assist patient with appropriate food choices  - Assess stoma site  Outcome: Progressing

## 2019-07-01 NOTE — ASSESSMENT & PLAN NOTE
· Present on admission with creatinine noted to be 1 90 with baseline creatinine noted to be between 1 2-1 4  Creatinine has improved    · Avoid nephrotoxic agents and hypotension

## 2019-07-01 NOTE — ASSESSMENT & PLAN NOTE
· Palliative care meeting with patient's wife, daughter and son occurred Friday 6/28  Presently now that hemoglobin has stabilized and patient no longer has active GI bleed, he is not a hospice candidate  If patient has any additional active bleeding or hemoglobin continues to drop, would be hospice candidate and it seems that family would be in agreement with this  · He remains level 3 DNR       · POLST verified with family via palliative care team

## 2019-07-01 NOTE — ASSESSMENT & PLAN NOTE
· Heart rate irregular on exam, EKG showing sinus bradycardia with PAC  · Do not feel that bradycardia showing on dynamap is accurate given PAC  · Will check Mag/K in AM  · NO need for beta blocker adjustment at this time    · Asymptomatic  · Reviewed with GABY DOS SANTOS

## 2019-07-01 NOTE — ASSESSMENT & PLAN NOTE
· Secondary to suspected diverticular bleed and the use of lovenox post hip repair 5/2019,   · Presented with bright red blood per rectum followed by melena as documented in medical record  This has now improved  · 6/30 with dark brown stool paste without any mention of BRB per nursing team    · Hemoglobin has remained stable s/p 4 transfusions this admission and is trending up today  Last unit PRBC 6/26  · CT of the abdomen and pelvis with mild acute uncomplicated sigmoid diverticulitis, cholelithiasis, renal cyst  · Continue diet as tolerated  · Check CBC in a m  To ensure stability  Plan will be for home with VNA and family support despite PT/OT recommendations for STR   Patient is not hospice appropriate per palliative care team

## 2019-07-01 NOTE — ASSESSMENT & PLAN NOTE
· Patient failed retention protocol and Sepulveda was inserted 6/27  · Urology consult reviewed, patient will continue with Sepulveda catheter  Would not attempt a void trial until 10-14 days post Sepulveda catheter placement    · Patient will be discharged with Sepulveda catheter and will need outpatient follow-up for void trial   · Continued ditropan

## 2019-07-01 NOTE — ASSESSMENT & PLAN NOTE
· Supportive care, continue with Zoloft daily  · PT/OT recommending STR on discharge, daughter with plans to take patient home regardless of recommendation  Case management will set up VNA  Family also requesting a hospital bed which will likely be delivered tomorrow

## 2019-07-01 NOTE — PROGRESS NOTES
Progress note - Palliative and Supportive Care   Seferino Burns 80 y o  male [id number]    Assessment:  Patient Active Problem List   Diagnosis    GI bleed    Weakness    Hypertension    CAD (coronary artery disease)    [facility]    Hyperlipidemia LDL goal <100    Back pain    Anemia, acute on chronic    Dementia    Idiopathic peripheral neuropathy    JENNYFER (acute kidney injury) ([facility] )    Actinic keratosis    Adhesive capsulitis of right shoulder    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    Allergic rhinitis    Arthropathy of multiple sites    Benign colon polyp    Benign essential hypertension    Chronic back pain    Diaphragmatic hernia    Gout    Hemorrhoids    Iron deficiency anemia due to chronic blood loss    Lumbar disc disease    Mitral valve disorder    Depression    Overactive bladder    Prediabetes    Rheumatoid arthritis (HCC)    Tricuspid valve disorders, non-rheumatic    Immunization due    Pain in both feet    Peripheral arteriosclerosis (HCC)    Onychomycosis    Paronychia of toenail    Tinea pedis of both feet    CVA (cerebral vascular accident) ([place] [id number] )    Normocytic anemia    Displaced intertrochanteric fracture of right femur, initial encounter for closed fracture ([place] [id number] )    Preoperative examination    Aortic stenosis, mild    Abnormal TSH    JENNYFER on CKD stage 3    Urinary retention    Fever    Goals of care, counseling/discussion    Acute post-hemorrhagic anemia    UTI (urinary tract infection)         Plan:  1  Symptom management -   · Oxycodone 2 5mg PO Q4H PRN mod-severe pain (no uses since 6/28)  · LMX cream    2  Goals - level 3 DNR  · hgb stable, no active bleeding therefor home with PT/OT a reasonable alternative to hospice  · POLST form provided  Family would like to check to see if they have a completed POLST at home  Otherwise was encouraged to follow-up with palliative care for ongoing support and POLST completion   Patient may be a candidate for PALS at home program if needs arise as it is difficult for him to physically make it to appointments  · CM assisting with discharge planning    Palliative care will see on an as needed basis remainder of hospitalization  Call with questions or concerns  Code Status: DNR - Level 3   Decisional apparatus:  Patient is not competent on my exam today  If competence is lost, patient's substitute decision maker would default to spouse by [place]  Spouse's ability to make decisions is marginal secondary to congnitive impairments following a stroke  Therefor decision making is deferred to adult children (3)  However, family tends to work as a unit making decisions together  Advance Directive / Living Will / POLST:  No POLST on file, but son thinks they have one done at home  Interval history:       Over the weekend hgb has stabilized  No BRBPR but dark paste-like stool that may have undetected blood  UTI treated with current ABX  Afebrile in the past 24H  No complaints      MEDICATIONS / ALLERGIES:     all current active meds have been reviewed and current meds:   Current Facility-Administered Medications   Medication Dose Route Frequency    ascorbic acid (VITAMIN C) tablet 250 mg  250 mg Oral Daily    atorvastatin (LIPITOR) tablet 40 mg  40 mg Oral QPM    cefTRIAXone (ROCEPHIN) 1,000 mg in dextrose 5 % 50 mL IVPB  1,000 mg Intravenous Q24H    docusate sodium (COLACE) capsule 100 mg  100 mg Oral BID    ferrous sulfate tablet 325 mg  325 mg Oral Daily With Breakfast    gabapentin (NEURONTIN) capsule 300 mg  300 mg Oral BID    lidocaine (LMX) 4 % cream   Topical 4x Daily PRN    metoprolol tartrate (LOPRESSOR) tablet 25 mg  25 mg Oral BID    oxybutynin (DITROPAN-XL) 24 hr tablet 5 mg  5 mg Oral Daily    oxyCODONE (ROXICODONE) IR tablet 2 5 mg  2 5 mg Oral Q4H PRN    pantoprazole (PROTONIX) injection 40 mg  40 mg Intravenous Q12H [facility]    polyethylene glycol (MIRALAX) packet 17 g  17 g Oral Daily PRN    predniSONE tablet 2 5 mg  2 5 mg Oral Daily    saccharomyces boulardii (FLORASTOR) capsule 250 mg  250 mg Oral BID    senna (SENOKOT) tablet 8 6 mg  1 tablet Oral HS    sertraline (ZOLOFT) tablet 50 mg  50 mg Oral Daily       No Known Allergies    OBJECTIVE:    Physical Exam  Physical Exam   Constitutional: He appears well-developed  HENT:   Head: Normocephalic and atraumatic  Eyes: Conjunctivae are normal    Cardiovascular: Normal rate  Pulmonary/Chest: Effort normal and breath sounds normal    Abdominal: Soft  Bowel sounds are normal  He exhibits no distension  There is no tenderness  Genitourinary:   Genitourinary Comments: Catheter in place   Musculoskeletal: He exhibits no edema  Neurological: He is alert  Only oriented to self   Skin: Skin is warm and dry  Psychiatric:   Flat affect, drowsy       Lab Results:   I have personally reviewed pertinent labs  , CBC:   Lab Results   Component Value Date    WBC 8 78 07/01/2019    HGB 9 1 (L) 07/01/2019    HCT 30 0 (L) 07/01/2019    MCV 92 07/01/2019     (H) 07/01/2019    MCH 27 7 07/01/2019    MCHC 30 3 (L) 07/01/2019    RDW 15 7 (H) 07/01/2019    MPV 9 8 07/01/2019    NRBC 0 07/01/2019   , CMP: No results found for: SODIUM, K, CL, CO2, ANIONGAP, BUN, CREATININE, GLUCOSE, CALCIUM, AST, ALT, ALKPHOS, PROT, BILITOT, EGFR  Imaging Studies: no new  EKG, Pathology, and Other Studies: no new    Counseling / Coordination of Care  Total floor / unit time spent today 35+ minutes  Greater than 50% of total time was spent with the patient and / or family counseling and / or coordination of care   A description of the counseling / coordination of care: time spent evaluating patient, communicating with primary team, hospice liaison, CM, and patient's 2 children (Francisco and Madeleine Britt)

## 2019-07-01 NOTE — PROGRESS NOTES
[address] [facility]    Progress Note - Briana Gallagher 6/27/1927, 80 y o  male MRN: [id number]    Unit/Bed#: CW2 213-01 Encounter: [id number]    Primary Care Provider: Po Hernández DO   Date and time admitted to hospital: 6/23/2019 11:40 AM     Addendum:  Will need hospital bed to discharge home with as patient needs frequent repositioning as well as ability to keep head of bed greater than 30°  * GI bleed  Assessment & Plan  · Secondary to suspected diverticular bleed and the use of lovenox post hip repair 5/2019,   · Presented with bright red blood per rectum followed by melena as documented in medical record  This has now improved  · 6/30 with dark brown stool paste without any mention of BRB per nursing team    · Hemoglobin has remained stable s/p 4 transfusions this admission and is trending up today  Last unit PRBC 6/26  · CT of the abdomen and pelvis with mild acute uncomplicated sigmoid diverticulitis, cholelithiasis, renal cyst  · Continue diet as tolerated  · Check CBC in a m  To ensure stability  Plan will be for home with VNA and family support despite PT/OT recommendations for STR  Patient is not hospice appropriate per palliative care team      UTI (urinary tract infection)  Assessment & Plan  · Sepulveda catheter placed due to urinary retention and failing retention protocol  When Sepulveda was placed thick milky like urine was noted  Patient then met sepsis criteria with a temp of 102 3° and a leukocytosis  Patient was started on intravenous ceftriaxone and urine culture now showing 2 strains of Klebsiella pneumoniae both susceptible to Keflex, will transition to oral Keflex today and treat for a total of 7 days  · Afebrile, leukocytosis resolved  Urinary retention  Assessment & Plan  · Patient failed retention protocol and Sepulveda was inserted 6/27  · Urology consult reviewed, patient will continue with Sepulveda catheter    Would not attempt a void trial until 10-14 days post Sepulveda catheter placement  · Patient will be discharged with Sepulveda catheter and will need outpatient follow-up for void trial   · Continued ditropan    Acute post-hemorrhagic anemia  Assessment & Plan  · In the setting of GI bleed as evidenced by bright red blood per rectum and hemoglobin of 6 4 on admission  Now improved and patient has received a total of 4 units packed red blood cells  Hemoglobin 9 1 today  · Additional plan as above  · Monitor stools  · CBC in a m  PAC (premature atrial contraction)  Assessment & Plan  · Heart rate irregular on exam, EKG showing sinus bradycardia with PAC  · Do not feel that bradycardia showing on dynamap is accurate given PAC  · Will check Mag/K in AM  · NO need for beta blocker adjustment at this time  · Asymptomatic  · Reviewed with EP LEVON    Rheumatoid arthritis (HCC)  Assessment & Plan  · Continue Prednisone 2 5 mg daily    Dementia  Assessment & Plan  · Supportive care, continue with Zoloft daily  · PT/OT recommending STR on discharge, daughter with plans to take patient home regardless of recommendation  Case management will set up VNA  Family also requesting a hospital bed which will likely be delivered tomorrow  Hypertension  Assessment & Plan  · Blood pressure acceptable  · Continue Lopressor  WIll resume home dose of Norvasc  · Monitor    Goals of care, counseling/discussion  Assessment & Plan  · Palliative care meeting with patient's wife, daughter and son occurred Friday 6/28  Presently now that hemoglobin has stabilized and patient no longer has active GI bleed, he is not a hospice candidate  If patient has any additional active bleeding or hemoglobin continues to drop, would be hospice candidate and it seems that family would be in agreement with this  · He remains level 3 DNR  · POLST verified with family via palliative care team     Feverresolved as of 7/1/2019  Assessment & Plan  · Now resolved  Secondary to UTI  Plan as above      JENNYFER on CKD stage 3resolved as of 2019  Assessment & Plan  · Present on admission with creatinine noted to be 1 90 with baseline creatinine noted to be between 1 2-1 4  Creatinine has improved  · Avoid nephrotoxic agents and hypotension      Pharmacologic: Pharmacologic VTE Prophylaxis contraindicated due to Anemia  Mechanical VTE Prophylaxis in Place: Yes    Patient Centered Rounds: I have performed bedside rounds with nursing staff today  Discussions with Specialists or Other Care Team Provider:  Nursing, palliative care, case management    Education and Discussions with Family / Patient:  Patient  Family updated by palliative care team today  Time Spent for Care: 45 minutes  More than 50% of total time spent on counseling and coordination of care as described above  Current Length of Stay: 7 day(s)    Current Patient Status: Inpatient   Certification Statement: The patient will continue to require additional inpatient hospital stay due to Safe discharge plan, awaiting equipment and home environment  Discharge Plan / Estimated Discharge Date:  Hopefully tomorrow as long as equipment delivered to home and family able to accept patient in home      Code Status: Level 3 - DNAR and DNI      Subjective:   Patient offers no complaints other than feeling tired  Objective:     Vitals:   Temp (24hrs), Av 4 °F (36 9 °C), Min:98 3 °F (36 8 °C), Max:98 4 °F (36 9 °C)    Temp:  [98 3 °F (36 8 °C)-98 4 °F (36 9 °C)] 98 4 °F (36 9 °C)  HR:  [65-71] 71  Resp:  [18] 18  BP: (123-159)/(57-80) 159/80  SpO2:  [98 %-100 %] 100 %  Body mass index is 23 17 kg/m²  Input and Output Summary (last 24 hours): Intake/Output Summary (Last 24 hours) at 2019 1448  Last data filed at 2019 0807  Gross per 24 hour   Intake 400 ml   Output 1050 ml   Net -650 ml       Physical Exam:     Physical Exam   Constitutional: No distress  HENT:   Head: Normocephalic     Eyes: Conjunctivae are normal    Neck: Normal range of motion  Cardiovascular: Normal rate  An irregular rhythm present  Pulmonary/Chest: Breath sounds normal    Abdominal: Bowel sounds are normal    Musculoskeletal: Normal range of motion  Skin: Skin is warm and dry  Psychiatric: Cognition and memory are impaired  Nursing note and vitals reviewed  Additional Data:     Labs:    Results from last 7 days   Lab Units 07/01/19  0451   WBC Thousand/uL 8 78   HEMOGLOBIN g/dL 9 1*   HEMATOCRIT % 30 0*   PLATELETS Thousands/uL 642*   NEUTROS PCT % 68   LYMPHS PCT % 13*   MONOS PCT % 10   EOS PCT % 6     Results from last 7 days   Lab Units 06/30/19  0504  06/28/19  0628   POTASSIUM mmol/L 3 4*   < > 3 3*   CHLORIDE mmol/L 110*   < > 107   CO2 mmol/L 22   < > 25   BUN mg/dL 10   < > 15   CREATININE mg/dL 0 81   < > 1 00   CALCIUM mg/dL 8 3   < > 8 2*   ALK PHOS U/L  --   --  92   ALT U/L  --   --  27   AST U/L  --   --  23    < > = values in this interval not displayed  Recent Cultures (last 7 days):     Results from last 7 days   Lab Units 06/27/19  1642 06/25/19  1835   BLOOD CULTURE   --  No Growth After 5 Days  No Growth After 5 Days     URINE CULTURE  >100,000 cfu/ml - STRAIN 1 Klebsiella pneumoniae*  >100,000 cfu/ml - STRAIN 2 Klebsiella pneumoniae*  --        Last 24 Hours Medication List:     Current Facility-Administered Medications:  [START ON 7/2/2019] amLODIPine 5 mg Oral Daily MAZIN Shen   vitamin C 250 mg Oral Daily Ashley Sexton PA-C   atorvastatin 40 mg Oral QPM Ashley Araya PA-C   cephalexin 500 mg Oral Q12H [address] MAZIN Shen   docusate sodium 100 mg Oral BID James Coreas MD   ferrous sulfate 325 mg Oral Daily With Breakfast James Coreas MD   gabapentin 300 mg Oral BID Burnice ChiomaMAZIN   lidocaine  Topical 4x Daily PRN Burnice ChiomaMAZIN   metoprolol tartrate 25 mg Oral BID Ashley Sexton PA-C   oxybutynin 5 mg Oral Daily Delaney Hager MD   oxyCODONE 2 5 mg Oral Q4H PRN Montoya Cos, LEVON   pantoprazole 40 mg Intravenous Q12H [facility] Mira Dumont MD   polyethylene glycol 17 g Oral Daily PRN MAZIN Cazares   predniSONE 2 5 mg Oral Daily Mira Dumont MD   saccharomyces boulardii 250 mg Oral BID Mira Dumont MD   senna 1 tablet Oral HS Ashley Araya PA-C   sertraline 50 mg Oral Daily Mira Dumont MD        Today, Patient Was Seen By: MAZIN Tamayo    ** Please Note: Dragon 360 Dictation voice to text software may have been used in the creation of this document   **

## 2019-07-01 NOTE — ASSESSMENT & PLAN NOTE
· In the setting of GI bleed as evidenced by bright red blood per rectum and hemoglobin of 6 4 on admission  Now improved and patient has received a total of 4 units packed red blood cells  Hemoglobin 9 1 today  · Additional plan as above  · Monitor stools  · CBC in a m

## 2019-07-01 NOTE — ASSESSMENT & PLAN NOTE
· Sepulveda catheter placed due to urinary retention and failing retention protocol  When Sepulveda was placed thick milky like urine was noted  Patient then met sepsis criteria with a temp of 102 3° and a leukocytosis  Patient was started on intravenous ceftriaxone and urine culture now showing 2 strains of Klebsiella pneumoniae both susceptible to Keflex, will transition to oral Keflex today and treat for a total of 7 days  · Afebrile, leukocytosis resolved

## 2019-07-01 NOTE — PLAN OF CARE
Problem: Potential for Falls  Goal: Patient will remain free of falls  Description  INTERVENTIONS:  - Assess patient frequently for physical needs  -  Identify cognitive and physical deficits and behaviors that affect risk of falls    -  [place] fall precautions as indicated by assessment   - Educate patient/family on patient safety including physical limitations  - Instruct patient to call for assistance with activity based on assessment  - Modify environment to reduce risk of injury  - Consider OT/PT consult to assist with strengthening/mobility  Outcome: Progressing     Problem: Prexisting or High Potential for Compromised Skin Integrity  Goal: Skin integrity is maintained or improved  Description  INTERVENTIONS:  - Identify patients at risk for skin breakdown  - Assess and monitor skin integrity  - Assess and monitor nutrition and hydration status  - Monitor labs (i e  albumin)  - Assess for incontinence   - Turn and reposition patient  - Assist with mobility/ambulation  - Relieve pressure over bony prominences  - Avoid friction and shearing  - Provide appropriate hygiene as needed including keeping skin clean and dry  - Evaluate need for skin moisturizer/barrier cream  - Collaborate with interdisciplinary team (i e  Nutrition, Rehabilitation, etc )   - Patient/family teaching  Outcome: Progressing     Problem: GASTROINTESTINAL - ADULT  Goal: Minimal or absence of nausea and/or vomiting  Description  INTERVENTIONS:  - Administer IV fluids as ordered to ensure adequate hydration  - Maintain NPO status until nausea and vomiting are resolved  - Nasogastric tube as ordered  - Administer ordered antiemetic medications as needed  - Provide nonpharmacologic comfort measures as appropriate  - Advance diet as tolerated, if ordered  - Nutrition services referral to assist patient with adequate nutrition and appropriate food choices  Outcome: Progressing     Problem: GASTROINTESTINAL - ADULT  Goal: Maintains or returns to baseline bowel function  Description  INTERVENTIONS:  - Assess bowel function  - Encourage oral fluids to ensure adequate hydration  - Administer IV fluids as ordered to ensure adequate hydration  - Administer ordered medications as needed  - Encourage mobilization and activity  - Nutrition services referral to assist patient with appropriate food choices  Outcome: Progressing     Problem: GASTROINTESTINAL - ADULT  Goal: Maintains adequate nutritional intake  Description  INTERVENTIONS:  - Monitor percentage of each meal consumed  - Identify factors contributing to decreased intake, treat as appropriate  - Assist with meals as needed  - Monitor I&O, WT and lab values  - Obtain nutrition services referral as needed  Outcome: Progressing

## 2019-07-01 NOTE — TRANSPORTATION MEDICAL NECESSITY
Section I - General Information    Name of Patient: Briana Gallagher                 : 1927    Medicare #: [id number]  Transport Date: 19 (PCS is valid for round trips on this date and for all repetitive trips in the 60-day range as noted below )  Origin: [address] [address]                                                         Destination: [place], [place] [address]  Is the pt's stay covered under Medicare Part A (PPS/DRG)   [x]     Closest appropriate facility? If no, why is transport to more distant facility required? Yes  If hospice pt, is this transport related to pt's terminal illness? NA       Section II - Medical Necessity Questionnaire  Ambulance transportation is medically necessary only if other means of transport are contraindicated or would be potentially harmful to the patient  To meet this requirement, the patient must either be "bed confined" or suffer from a condition such that transport by means other than ambulance is contraindicated by the patient's condition  The following questions must be answered by the medical professional signing below for this form to be valid:    1)  Describe the MEDICAL CONDITION (physical and/or mental) of this patient AT [address] [place] [address] that requires the patient to be transported in an ambulance and why transport by other means is contraindicated by the patient's condition:Ambulatory Dysfunction  G I Bleed    2) Is the patient "bed confined" as defined below? Yes  To be "be confined" the patient must satisfy all three of the following conditions: (1) unable to get up from bed without Assistance; AND (2) unable to ambulate; AND (3) unable to sit in a chair or wheelchair  3) Can this patient safely be transported by car or wheelchair van (i e , seated during transport without a medical attendant or monitoring)?    No    4) In addition to completing questions 1-3 above, please check any of the following conditions that apply*:   *Note: supporting documentation for any boxes checked must be maintained in the patient's medical records  If hosp-hosp transfer, describe services needed at 2nd facility not available at 1st facility? Patient is confused  Moderate/severe pain on movement   Medical attendant required   Unable to tolerate seated position for time needed to transport   Unable to sit in a chair or wheelchair due to decubitus ulcers or other wounds       Section III - Signature of Physician or Healthcare Professional  I certify that the above information is true and correct based on my evaluation of this patient, and represent that the patient requires transport by ambulance and that other forms of transport are contraindicated  I understand that this information will be used by the Centers for Medicare and Medicaid Services (CMS) to support the determination of medical necessity for ambulance services, and I represent that I have personal knowledge of the patient's condition at time of transport  [x]  If this box is checked, I also certify that the patient is physically or mentally incapable of signing the ambulance service's claim and that the institution with which I am affiliated has furnished care, services, or assistance to the patient  My signature below is made on behalf of the patient pursuant to 42 CFR §424 36(b)(4)  In accordance with 42 CFR §424 37, the specific reason(s) that the patient is physically or mentally incapable of signing the claim form is as follows: Dementia        Signature of Physician* or Healthcare Professional______________________________________________________________  Signature Date 07/01/19 (For scheduled repetitive transports, this form is not valid for transports performed more than 60 days after this date)    Printed Name & Credentials of Physician or Healthcare Professional (MD, , RN, etc )__JUNE RYAN GRAVES CM______________________________  *Form must be signed by patient's attending physician for scheduled, repetitive transports   For non-repetitive, unscheduled ambulance transports, if unable to obtain the signature of the attending physician, any of the following may sign (choose appropriate option below)  [] Physician Assistant []  Clinical Nurse Specialist [x]  Registered Nurse  []  Nurse Practitioner  [] Discharge Planner

## 2019-07-01 NOTE — HOSPICE NOTE
Discussed case with Dr Vivien Jarquin  Patient does not meet criteria at this time for hospice  ENRIKE Amado with palliative care and  June aware

## 2019-07-02 VITALS
OXYGEN SATURATION: 100 % | WEIGHT: 150.3 LBS | BODY MASS INDEX: 22.78 KG/M2 | HEART RATE: 70 BPM | HEIGHT: 68 IN | TEMPERATURE: 98 F | DIASTOLIC BLOOD PRESSURE: 55 MMHG | RESPIRATION RATE: 14 BRPM | SYSTOLIC BLOOD PRESSURE: 122 MMHG

## 2019-07-02 PROBLEM — K92.2 GI BLEED: Status: RESOLVED | Noted: 2017-01-16 | Resolved: 2019-07-02

## 2019-07-02 LAB
ANION GAP SERPL CALCULATED.3IONS-SCNC: 6 MMOL/L (ref 4–13)
BASOPHILS # BLD AUTO: 0.18 THOUSANDS/ΜL (ref 0–0.1)
BASOPHILS NFR BLD AUTO: 3 % (ref 0–1)
BUN SERPL-MCNC: 12 MG/DL (ref 5–25)
CALCIUM SERPL-MCNC: 8.7 MG/DL (ref 8.3–10.1)
CHLORIDE SERPL-SCNC: 109 MMOL/L (ref 100–108)
CO2 SERPL-SCNC: 26 MMOL/L (ref 21–32)
CREAT SERPL-MCNC: 0.95 MG/DL (ref 0.6–1.3)
EOSINOPHIL # BLD AUTO: 0.48 THOUSAND/ΜL (ref 0–0.61)
EOSINOPHIL NFR BLD AUTO: 7 % (ref 0–6)
ERYTHROCYTE [DISTWIDTH] IN BLOOD BY AUTOMATED COUNT: 15.7 % (ref 11.6–15.1)
GFR SERPL CREATININE-BSD FRML MDRD: 69 ML/MIN/1.73SQ M
GLUCOSE SERPL-MCNC: 89 MG/DL (ref 65–140)
HCT VFR BLD AUTO: 31.2 % (ref 36.5–49.3)
HGB BLD-MCNC: 9.6 G/DL (ref 12–17)
IMM GRANULOCYTES # BLD AUTO: 0.03 THOUSAND/UL (ref 0–0.2)
IMM GRANULOCYTES NFR BLD AUTO: 0 % (ref 0–2)
LYMPHOCYTES # BLD AUTO: 1.2 THOUSANDS/ΜL (ref 0.6–4.47)
LYMPHOCYTES NFR BLD AUTO: 17 % (ref 14–44)
MAGNESIUM SERPL-MCNC: 2.1 MG/DL (ref 1.6–2.6)
MCH RBC QN AUTO: 28.1 PG (ref 26.8–34.3)
MCHC RBC AUTO-ENTMCNC: 30.8 G/DL (ref 31.4–37.4)
MCV RBC AUTO: 91 FL (ref 82–98)
MONOCYTES # BLD AUTO: 0.68 THOUSAND/ΜL (ref 0.17–1.22)
MONOCYTES NFR BLD AUTO: 9 % (ref 4–12)
NEUTROPHILS # BLD AUTO: 4.71 THOUSANDS/ΜL (ref 1.85–7.62)
NEUTS SEG NFR BLD AUTO: 64 % (ref 43–75)
NRBC BLD AUTO-RTO: 0 /100 WBCS
PLATELET # BLD AUTO: 699 THOUSANDS/UL (ref 149–390)
PMV BLD AUTO: 9.8 FL (ref 8.9–12.7)
POTASSIUM SERPL-SCNC: 3.6 MMOL/L (ref 3.5–5.3)
RBC # BLD AUTO: 3.42 MILLION/UL (ref 3.88–5.62)
SODIUM SERPL-SCNC: 141 MMOL/L (ref 136–145)
WBC # BLD AUTO: 7.28 THOUSAND/UL (ref 4.31–10.16)

## 2019-07-02 PROCEDURE — 99239 HOSP IP/OBS DSCHRG MGMT >30: CPT | Performed by: NURSE PRACTITIONER

## 2019-07-02 PROCEDURE — C9113 INJ PANTOPRAZOLE SODIUM, VIA: HCPCS | Performed by: INTERNAL MEDICINE

## 2019-07-02 PROCEDURE — 80048 BASIC METABOLIC PNL TOTAL CA: CPT | Performed by: NURSE PRACTITIONER

## 2019-07-02 PROCEDURE — 85025 COMPLETE CBC W/AUTO DIFF WBC: CPT | Performed by: NURSE PRACTITIONER

## 2019-07-02 PROCEDURE — 83735 ASSAY OF MAGNESIUM: CPT | Performed by: NURSE PRACTITIONER

## 2019-07-02 RX ORDER — GABAPENTIN 300 MG/1
300 CAPSULE ORAL 2 TIMES DAILY
Qty: 60 CAPSULE | Refills: 0 | Status: SHIPPED | OUTPATIENT
Start: 2019-07-02

## 2019-07-02 RX ORDER — POLYETHYLENE GLYCOL 3350 17 G/17G
17 POWDER, FOR SOLUTION ORAL DAILY PRN
Qty: 14 EACH | Refills: 0
Start: 2019-07-02

## 2019-07-02 RX ORDER — SERTRALINE HYDROCHLORIDE 25 MG/1
50 TABLET, FILM COATED ORAL DAILY
Refills: 0 | Status: CANCELLED | OUTPATIENT
Start: 2019-07-02

## 2019-07-02 RX ORDER — CEPHALEXIN 500 MG/1
500 CAPSULE ORAL EVERY 12 HOURS SCHEDULED
Qty: 4 CAPSULE | Refills: 0 | Status: SHIPPED | OUTPATIENT
Start: 2019-07-02 | End: 2019-07-04

## 2019-07-02 RX ORDER — PANTOPRAZOLE SODIUM 40 MG/1
40 TABLET, DELAYED RELEASE ORAL DAILY
Qty: 30 TABLET | Refills: 0 | Status: SHIPPED | OUTPATIENT
Start: 2019-07-02

## 2019-07-02 RX ADMIN — PANTOPRAZOLE SODIUM 40 MG: 40 INJECTION, POWDER, FOR SOLUTION INTRAVENOUS at 09:52

## 2019-07-02 RX ADMIN — GABAPENTIN 300 MG: 300 CAPSULE ORAL at 17:16

## 2019-07-02 RX ADMIN — DOCUSATE SODIUM 100 MG: 100 CAPSULE, LIQUID FILLED ORAL at 17:16

## 2019-07-02 RX ADMIN — METOPROLOL TARTRATE 25 MG: 25 TABLET, FILM COATED ORAL at 17:17

## 2019-07-02 RX ADMIN — Medication 250 MG: at 17:16

## 2019-07-02 RX ADMIN — ATORVASTATIN CALCIUM 40 MG: 40 TABLET, FILM COATED ORAL at 17:16

## 2019-07-02 NOTE — DISCHARGE SUMMARY
Discharge Summary - [address] [facility]    Patient Information: Lesly Luis 80 y o  male MRN: [id number]  Unit/Bed#: CW2 213-01 Encounter: [id number]    Discharging Physician / Practitioner: MAZIN Garzon  PCP: Sinai Marin DO  Admission Date: 6/23/2019  Discharge Date: 07/02/19    Reason for Admission:  GI bleed, anemia    Discharge Diagnoses:     Principal Problem (Resolved):    GI bleed  Active Problems:    UTI (urinary tract infection)    Urinary retention    Acute post-hemorrhagic anemia    Hypertension    Dementia    Rheumatoid arthritis ([address] )    PAC (premature atrial contraction)    Goals of care, counseling/discussion  Resolved Problems:    Fever    JENNYFER on CKD stage 3      Consultations During Hospital Stay:  · Gastroenterology  · Palliative care  · Urology  · PT/OT  · Case Management    Procedures Performed:     · Sepulveda catheter placement 6/27  · Blood transfusion with 1 unit packed red blood cells x4    Significant Findings / Test Results:     · CT abdomen pelvis without contrast:  Mild acute uncomplicated sigmoid diverticulitis  Cholelithiasis  Renal cyst   · Chest x-ray interstitial infiltrate in the right lung  · Hemoglobin on day of discharge 9 6  · Urine culture positive for 2 strains of Klebsiella    Incidental Findings:   · None    Test Results Pending at Discharge (will require follow up): · None     Outpatient Tests Requested:  · Outpatient follow-up with Urology for a void trial   Outpatient follow-up with palliative care if patient and family wish  Outpatient follow-up with PCP    Complications:  None    Hospital Course:     Lesly Luis is a 80 y o  male patient with past medical history of rheumatoid arthritis, hypertension, dementia, CKD stage 3, previous CVA, right femur fracture status post internal fixation who originally presented to the hospital on 6/23/2019 due to bloody bowel movements    Patient was found to have melanotic stools on examination in the emergency department  Also with transient hypotension prior to arrival per EMS  Patient was evaluated by the gastroenterology team   Concern was for ischemia versus diverticular bleed  Patient did require multiple blood transfusions throughout hospitalization and hemoglobin is now stable at 9 6  He was on Lovenox given his recent hip surgery which has been stopped  His aspirin has also been stopped  Gastroenterology did discuss possibility of colonoscopy with daughter however she prefers to hold off from any endoscopic intervention at this point  Goals of care were discussed and palliative care team was asked to see patient and talk to daughter  Daughter was in favor of hospice care however given that GI bleed seems to have resolved and hemoglobin is stable, no hospice criteria at this time  Daughter is aware  Recommendation was for rehabilitation however patient family refusing  She would like to take him home with hospital bed and visiting nurses  Patient was also found to have urinary retention, when catheter was placed there was a white milky type substance that came out  Urine culture grew Klebsiella and patient was treated with several days of ceftriaxone  Switched to Keflex on 07/01 to complete 7 day course through 7/4  Urology did evaluate patient and recommended that we keep Eisenberg catheter in place at least for 10-14 days, currently day 5 of eisenberg catheter  Daughter is aware of this and ambulatory referral has been made for urology team for outpatient void trial     Patient is stable for discharge given his hemoglobin is stable  Patient is very weak and frail is high risk for readmission given his current functional status and lack of skilled care able to be provided in the home environment  Daughter is much aware of our recommendation for short-term rehab versus long term placement but continues to refuse as she wants to focus more on comfort for father    Hospital bed to be delivered to home today per case management  We also discussed that being off ASA does put him at higher risk of stroke  I noted he has a f/u appt with neurology on 7/10 but daughter unsure if this will be kept  Palliative care team information placed on discharge instructions and ambulatory referral made  Recommend follow-up with PCP within 1 week and patient should also have repeat CBC done in 1 week which can be done by VNA to ensure stabilization of hemoglobin  Scripts faxed to [facility] per daughter request      Condition at Discharge: fair     Discharge Day Visit / Exam:     Subjective:  Patient offers no complaints  Vitals: Blood Pressure: 138/62 (07/02/19 0747)  Pulse: 56 (07/02/19 0747)  Temperature: 98 °F (36 7 °C) (07/02/19 0747)  Temp Source: Oral (06/29/19 2302)  Respirations: 18 (07/02/19 0747)  Height: 5' 8" (172 7 cm) (06/23/19 1700)  Weight - Scale: 68 2 kg (150 lb 4 8 oz) (07/02/19 0747)  SpO2: 99 % (07/02/19 0747)     Exam:   Physical Exam   Constitutional: No distress  Patient appears very weak and frail   HENT:   Head: Normocephalic  Eyes: Conjunctivae are normal    Neck: Normal range of motion  Cardiovascular: Normal rate  An irregularly irregular rhythm present  Pulmonary/Chest: Breath sounds normal    Abdominal: Bowel sounds are normal    Genitourinary:   Genitourinary Comments: Sepulveda catheter in place   Musculoskeletal: Normal range of motion  He exhibits no edema  Neurological: He is alert  Skin: Skin is warm and dry  Psychiatric: He has a normal mood and affect  Cognition and memory are impaired  Nursing note and vitals reviewed  Discussion with Family:  Patient and daughter over the phone    Discharge instructions/Information to patient and family:   See after visit summary for information provided to patient and family  Provisions for Follow-Up Care:  See after visit summary for information related to follow-up care and any pertinent home health orders        Disposition: Home with VNA Services (Reminder: Complete face to face encounter)    For Discharges to [facility] SNF:   · Not Applicable to this Patient - Not Applicable to this Patient    Planned Readmission: no     Discharge Statement:  I spent 60 minutes discharging the patient  This time was spent on the day of discharge  I had direct contact with the patient on the day of discharge  Greater than 50% of the total time was spent examining patient, answering all patient questions, arranging and discussing plan of care with patient as well as directly providing post-discharge instructions  Additional time then spent on discharge activities  Discharge Medications:  See after visit summary for reconciled discharge medications provided to patient and family        ** Please Note: This note has been constructed using a voice recognition system **

## 2019-07-02 NOTE — PLAN OF CARE
Problem: Potential for Falls  Goal: Patient will remain free of falls  Description  INTERVENTIONS:  - Assess patient frequently for physical needs  -  Identify cognitive and physical deficits and behaviors that affect risk of falls    -  [place] fall precautions as indicated by assessment   - Educate patient/family on patient safety including physical limitations  - Instruct patient to call for assistance with activity based on assessment  - Modify environment to reduce risk of injury  - Consider OT/PT consult to assist with strengthening/mobility  Outcome: Progressing     Problem: Prexisting or High Potential for Compromised Skin Integrity  Goal: Skin integrity is maintained or improved  Description  INTERVENTIONS:  - Identify patients at risk for skin breakdown  - Assess and monitor skin integrity  - Assess and monitor nutrition and hydration status  - Monitor labs (i e  albumin)  - Assess for incontinence   - Turn and reposition patient  - Assist with mobility/ambulation  - Relieve pressure over bony prominences  - Avoid friction and shearing  - Provide appropriate hygiene as needed including keeping skin clean and dry  - Evaluate need for skin moisturizer/barrier cream  - Collaborate with interdisciplinary team (i e  Nutrition, Rehabilitation, etc )   - Patient/family teaching  Outcome: Progressing     Problem: GASTROINTESTINAL - ADULT  Goal: Minimal or absence of nausea and/or vomiting  Description  INTERVENTIONS:  - Administer IV fluids as ordered to ensure adequate hydration  - Maintain NPO status until nausea and vomiting are resolved  - Nasogastric tube as ordered  - Administer ordered antiemetic medications as needed  - Provide nonpharmacologic comfort measures as appropriate  - Advance diet as tolerated, if ordered  - Nutrition services referral to assist patient with adequate nutrition and appropriate food choices  Outcome: Progressing     Problem: GASTROINTESTINAL - ADULT  Goal: Maintains or returns to baseline bowel function  Description  INTERVENTIONS:  - Assess bowel function  - Encourage oral fluids to ensure adequate hydration  - Administer IV fluids as ordered to ensure adequate hydration  - Administer ordered medications as needed  - Encourage mobilization and activity  - Nutrition services referral to assist patient with appropriate food choices  Outcome: Progressing     Problem: GASTROINTESTINAL - ADULT  Goal: Maintains adequate nutritional intake  Description  INTERVENTIONS:  - Monitor percentage of each meal consumed  - Identify factors contributing to decreased intake, treat as appropriate  - Assist with meals as needed  - Monitor I&O, WT and lab values  - Obtain nutrition services referral as needed  Outcome: Progressing

## 2019-07-02 NOTE — PLAN OF CARE
Problem: Potential for Falls  Goal: Patient will remain free of falls  Description  INTERVENTIONS:  - Assess patient frequently for physical needs  -  Identify cognitive and physical deficits and behaviors that affect risk of falls    -  [place] fall precautions as indicated by assessment   - Educate patient/family on patient safety including physical limitations  - Instruct patient to call for assistance with activity based on assessment  - Modify environment to reduce risk of injury  - Consider OT/PT consult to assist with strengthening/mobility  Outcome: Progressing     Problem: Prexisting or High Potential for Compromised Skin Integrity  Goal: Skin integrity is maintained or improved  Description  INTERVENTIONS:  - Identify patients at risk for skin breakdown  - Assess and monitor skin integrity  - Assess and monitor nutrition and hydration status  - Monitor labs (i e  albumin)  - Assess for incontinence   - Turn and reposition patient  - Assist with mobility/ambulation  - Relieve pressure over bony prominences  - Avoid friction and shearing  - Provide appropriate hygiene as needed including keeping skin clean and dry  - Evaluate need for skin moisturizer/barrier cream  - Collaborate with interdisciplinary team (i e  Nutrition, Rehabilitation, etc )   - Patient/family teaching  Outcome: Progressing     Problem: GASTROINTESTINAL - ADULT  Goal: Minimal or absence of nausea and/or vomiting  Description  INTERVENTIONS:  - Administer IV fluids as ordered to ensure adequate hydration  - Maintain NPO status until nausea and vomiting are resolved  - Nasogastric tube as ordered  - Administer ordered antiemetic medications as needed  - Provide nonpharmacologic comfort measures as appropriate  - Advance diet as tolerated, if ordered  - Nutrition services referral to assist patient with adequate nutrition and appropriate food choices  Outcome: Progressing  Goal: Maintains or returns to baseline bowel function  Description  INTERVENTIONS:  - Assess bowel function  - Encourage oral fluids to ensure adequate hydration  - Administer IV fluids as ordered to ensure adequate hydration  - Administer ordered medications as needed  - Encourage mobilization and activity  - Nutrition services referral to assist patient with appropriate food choices  Outcome: Progressing  Goal: Maintains adequate nutritional intake  Description  INTERVENTIONS:  - Monitor percentage of each meal consumed  - Identify factors contributing to decreased intake, treat as appropriate  - Assist with meals as needed  - Monitor I&O, WT and lab values  - Obtain nutrition services referral as needed  Outcome: Progressing  Goal: Establish and maintain optimal ostomy function  Description  INTERVENTIONS:  - Assess bowel function  - Encourage oral fluids to ensure adequate hydration  - Administer IV fluids as ordered to ensure adequate hydration  - Administer ordered medications as needed  - Encourage mobilization and activity  - Nutrition services referral to assist patient with appropriate food choices  - Assess stoma site  Outcome: Progressing

## 2019-07-02 NOTE — SOCIAL WORK
Multiple calls to [facility] regarding status of hospital bed delivery  Refaxed notes to [facility]     [phone]  Bed being delivered between 4-7 PM   BLS Brianna Bruner) ambulance pickup time for 9:15 PM  Janice/HEATHER aware, Pacheco Lorenzo NP aware, RN aware

## 2019-07-03 ENCOUNTER — TELEPHONE (OUTPATIENT)
Dept: FAMILY MEDICINE CLINIC | Facility: CLINIC | Age: 84
End: 2019-07-03

## 2019-07-03 ENCOUNTER — TRANSITIONAL CARE MANAGEMENT (OUTPATIENT)
Dept: FAMILY MEDICINE CLINIC | Facility: CLINIC | Age: 84
End: 2019-07-03

## 2019-07-03 DIAGNOSIS — G60.9 IDIOPATHIC PERIPHERAL NEUROPATHY: Chronic | ICD-10-CM

## 2019-07-03 DIAGNOSIS — I63.9 CEREBROVASCULAR ACCIDENT (CVA), UNSPECIFIED MECHANISM (HCC): ICD-10-CM

## 2019-07-03 DIAGNOSIS — S72.141A DISPLACED INTERTROCHANTERIC FRACTURE OF RIGHT FEMUR, INITIAL ENCOUNTER FOR CLOSED FRACTURE (HCC): Primary | ICD-10-CM

## 2019-07-03 DIAGNOSIS — R41.3 AGE-RELATED MEMORY DISORDER: ICD-10-CM

## 2019-07-03 NOTE — TELEPHONE ENCOUNTER
Trudy Max called back  She spoke with [facility]  ([phone] Rav) They would like a script from Dr Israel Coyle for [facility] evaluation and treatment" to be faxed to [phone]  They also want a demographics sheet including a copy of his  Medicare Card and SS number attached as well as his last office visit note  (they know his last office visit was in April) I told her we will get this faxed to them ASAP today since tomorrow is a holiday  I ordered this and signed it- Faxed to visiting nurses as requested   Jad Colvin LPN

## 2019-07-03 NOTE — TELEPHONE ENCOUNTER
Alex's daughter called  She was notified that the visiting nurses cannot come to the home to see him without documentation from Dr Sj Jasmine (referring him) She told them that Dr Sj Jasmine saw him in 4/26 and she said was told that he would need to see him again  (he was just discharged from the hospital yesterday on palliative care) Ramya Penny is bed bound and they cannot bring him to the office  She does not want to put him through transporting him here since he has a broken hip  She will call the visiting nurse back to find out exactly what they need from Dr Sj Jasmine  Can they provide a form for us to fill out? She will get more information on exactly what is needed and we will further assist her  Leave this message open-Await a call back from Janna Pa LPN

## 2019-07-03 NOTE — TELEPHONE ENCOUNTER
Faxed over RX script for "Hospice Eval and treatment, demographic sheet, old medicare card(which has SS# on it), new medicare card (primary ins), [employer] medigap card (secondary ins) and Last OVS from 4/26/19    Awaiting confirmation

## 2019-07-05 ENCOUNTER — EPISODE CHANGES (OUTPATIENT)
Dept: CASE MANAGEMENT | Facility: OTHER | Age: 84
End: 2019-07-05

## 2019-07-05 ENCOUNTER — PATIENT OUTREACH (OUTPATIENT)
Dept: CASE MANAGEMENT | Facility: HOSPITAL | Age: 84
End: 2019-07-05

## 2019-07-05 NOTE — PROGRESS NOTES
Discharged home from [facility] 7/2/19  [facility] services to be provided by [facility]

## 2019-07-10 ENCOUNTER — PATIENT OUTREACH (OUTPATIENT)
Dept: FAMILY MEDICINE CLINIC | Facility: CLINIC | Age: 84
End: 2019-07-10

## 2019-07-10 NOTE — PROGRESS NOTES
Jenean Closs is now a [facility] patient  He is comfortable, eating, able to raise up with assistance and use commode  He has daily [facility] service for personal care  RN visits twice weekly  Daughter and wife are with him  [facility] calls are refused but daughter Eddy Brewer does take my number and offer of future assistance if needed

## 2019-08-27 ENCOUNTER — PATIENT OUTREACH (OUTPATIENT)
Dept: FAMILY MEDICINE CLINIC | Facility: CLINIC | Age: 84
End: 2019-08-27

## 2019-10-28 ENCOUNTER — TELEPHONE (OUTPATIENT)
Dept: FAMILY MEDICINE CLINIC | Facility: CLINIC | Age: 84
End: 2019-10-28

## 2020-08-01 NOTE — ASSESSMENT & PLAN NOTE
· Patient asymptomatic  · Continue to follow urinary retention protocol  · Monitor I+O  · If patient requires [place] recommend urology consultation  · Continued ditropan RLE cellulitis

## 2021-02-08 ENCOUNTER — TELEPHONE (OUTPATIENT)
Dept: FAMILY MEDICINE CLINIC | Facility: CLINIC | Age: 86
End: 2021-02-08

## 2021-02-08 NOTE — TELEPHONE ENCOUNTER
Called patient to schedule for an AWV, phone number was disconnected  I called the patients daughter, Magdalena Dietz, and she said the patient has been  for over a year now  Please make the proper adjustments to patients chart  Thank you    Taco Robins MA

## 2021-02-08 NOTE — TELEPHONE ENCOUNTER
21 6:12 PM     Thank you for your request  Your request has been received and reviewed  Please follow  workflow (unverified) and then remove PCP at office level  This message will now be completed      Thank you  Yazmin Puente

## 2024-05-31 NOTE — MISCELLANEOUS
Message   Recorded as Task   Date: 11/17/2017 03:46 PM, Created By: Gerry Braga   Task Name: Call Back   Assigned To: Joe Doll   Regarding Patient: Easton Rodriguez, Status: Active   CommentRubin Mu - 17 Nov 2017 3:46 PM     TASK CREATED  Caller: Daughter   She called from her car, she  will go to his house shortly  He fell out of bed twice today  He is now not eating or drinking  Her Mom stated they are just starting to eat lunch now  He is not drinking much today  I did advise ER for evaluation    Joe Maxwell - 17 Nov 2017 5:32 PM     TASK EDITED  ok        Signatures   Electronically signed by : Rosio Villa DO; Nov 17 2017  5:32PM EST                       (Author) Neck/Back Pain  Rest is encouraged, no strenuous activity. Gentle stretching encouraged  Use Ibuprofen consistently for the next 3-5 days.   May alternate heat and ice  Use muscle relaxant as needed, but this may cause drowsiness  Hydrocodone as needed for severe pain, but may cause drowsiness  Follow up with primary care if no improvement, ER if you pain becomes severe .

## (undated) DEVICE — 4.0MM THREE-FLUTED DRILL BIT QC/260MM/65MM CALIBRATION

## (undated) DEVICE — GLOVE INDICATOR PI UNDERGLOVE SZ 7.5 BLUE

## (undated) DEVICE — SPONGE PVP SCRUB WING STERILE

## (undated) DEVICE — SUT VICRYL PLUS 2-0 CTB-1 27 IN VCPB259H

## (undated) DEVICE — GLOVE EXAM NON-STRL NTRL PLUS LRG PF

## (undated) DEVICE — TUBING BUBBLE CLEAR 5MM X 100 FT NS

## (undated) DEVICE — DISPOSABLE BIOPSY VALVE MAJ-1555: Brand: SINGLE USE BIOPSY VALVE (STERILE)

## (undated) DEVICE — GAUZE SPONGES,16 PLY: Brand: CURITY

## (undated) DEVICE — MEDI-VAC YANK SUCT HNDL W/TPRD BULBOUS TIP: Brand: CARDINAL HEALTH

## (undated) DEVICE — ACE WRAP 6 IN UNSTERILE

## (undated) DEVICE — GLOVE SRG BIOGEL 7.5

## (undated) DEVICE — INTENDED FOR TISSUE SEPARATION, AND OTHER PROCEDURES THAT REQUIRE A SHARP SURGICAL BLADE TO PUNCTURE OR CUT.: Brand: BARD-PARKER SAFETY BLADES SIZE 10, STERILE

## (undated) DEVICE — PADDING CAST 4 IN  COTTON STRL

## (undated) DEVICE — SINGLE-USE POLYPECTOMY SNARE: Brand: SENSATION SHORT THROW

## (undated) DEVICE — 6617 IOBAN II PATIENT ISOLATION DRAPE 5/BX,4BX/CS: Brand: STERI-DRAPE™ IOBAN™ 2

## (undated) DEVICE — ARTHROSCOPY FLOOR MAT

## (undated) DEVICE — MEDI-VAC YANKAUER SUCTION HANDLE: Brand: CARDINAL HEALTH

## (undated) DEVICE — DRESSING MEPILEX AG BORDER 4 X 4 IN

## (undated) DEVICE — 3.2MM GUIDE WIRE 400MM

## (undated) DEVICE — STERILE ORIF HIP PACK: Brand: CARDINAL HEALTH

## (undated) DEVICE — TUBING AUX CHANNEL

## (undated) DEVICE — PAD GROUNDING ADULT

## (undated) DEVICE — BRUSH ENDO CLEANING DBL-HEADER

## (undated) DEVICE — CHLORAPREP HI-LITE 26ML ORANGE

## (undated) DEVICE — SUT VICRYL PLUS 0 CTB-1 27 IN VCPB260H

## (undated) DEVICE — "MH-438 A/W VLVE F/140 EVIS-140": Brand: AIR/WATER VALVE

## (undated) DEVICE — CYSTO TUBING SINGLE IRRIGATION

## (undated) DEVICE — SOLIDIFIER FLUID WASTE CONTROL 1500ML

## (undated) DEVICE — PLUMEPEN PRO 10FT

## (undated) DEVICE — "MAJ-901 WATER CONTAINER SET CV-160/140": Brand: WATER CONTAINER

## (undated) DEVICE — "MH-443 SUCTION VALVE F/EVIS140 EVIS160": Brand: SUCTION VALVE

## (undated) DEVICE — INTENDED FOR TISSUE SEPARATION, AND OTHER PROCEDURES THAT REQUIRE A SHARP SURGICAL BLADE TO PUNCTURE OR CUT.: Brand: BARD-PARKER SAFETY BLADES SIZE 15, STERILE

## (undated) DEVICE — DRAPE SURGIKIT SADDLE BAG

## (undated) DEVICE — LUBRICANT SURGILUBE TUBE 4 OZ  FLIP TOP

## (undated) DEVICE — TRAP POLY

## (undated) DEVICE — ABDOMINAL PAD: Brand: DERMACEA

## (undated) DEVICE — 1200CC GUARDIAN II: Brand: GUARDIAN

## (undated) DEVICE — AIRLIFE™  ADULT CUSHION NASAL CANNULA WITH 7 FOOT (2.1 M) CRUSH-RESISTANT OXYGEN TUBING, AND U/CONNECT-IT ADAPTER: Brand: AIRLIFE™

## (undated) DEVICE — ALL PURPOSE SPONGES,NONWOVEN, 4 PLY: Brand: CURITY